# Patient Record
Sex: FEMALE | Race: WHITE | Employment: FULL TIME | URBAN - METROPOLITAN AREA
[De-identification: names, ages, dates, MRNs, and addresses within clinical notes are randomized per-mention and may not be internally consistent; named-entity substitution may affect disease eponyms.]

---

## 2017-01-04 ENCOUNTER — HOSPITAL ENCOUNTER (EMERGENCY)
Age: 22
Discharge: HOME | End: 2017-01-05
Attending: EMERGENCY MEDICINE
Payer: SELF-PAY

## 2017-01-04 DIAGNOSIS — F13.90 BENZODIAZEPINE MISUSE: ICD-10-CM

## 2017-01-04 DIAGNOSIS — F10.929 ALCOHOL INTOXICATION, WITH UNSPECIFIED COMPLICATION (HCC): Primary | ICD-10-CM

## 2017-01-04 LAB
ALBUMIN SERPL BCP-MCNC: 3.9 G/DL (ref 3.5–5)
ALBUMIN/GLOB SERPL: 1.1 {RATIO} (ref 1.1–2.2)
ALP SERPL-CCNC: 74 U/L (ref 45–117)
ALT SERPL-CCNC: 22 U/L (ref 12–78)
ANION GAP BLD CALC-SCNC: 11 MMOL/L (ref 5–15)
AST SERPL W P-5'-P-CCNC: 16 U/L (ref 15–37)
BASOPHILS # BLD AUTO: 0 K/UL (ref 0–0.1)
BASOPHILS # BLD: 0 % (ref 0–1)
BILIRUB SERPL-MCNC: 0.2 MG/DL (ref 0.2–1)
BUN SERPL-MCNC: 14 MG/DL (ref 6–20)
BUN/CREAT SERPL: 14 (ref 12–20)
CALCIUM SERPL-MCNC: 8.5 MG/DL (ref 8.5–10.1)
CHLORIDE SERPL-SCNC: 107 MMOL/L (ref 97–108)
CO2 SERPL-SCNC: 26 MMOL/L (ref 21–32)
CREAT SERPL-MCNC: 1.02 MG/DL (ref 0.55–1.02)
DIFFERENTIAL METHOD BLD: ABNORMAL
EOSINOPHIL # BLD: 0.1 K/UL (ref 0–0.4)
EOSINOPHIL NFR BLD: 1 % (ref 0–7)
ERYTHROCYTE [DISTWIDTH] IN BLOOD BY AUTOMATED COUNT: 13.6 % (ref 11.5–14.5)
GLOBULIN SER CALC-MCNC: 3.4 G/DL (ref 2–4)
GLUCOSE SERPL-MCNC: 86 MG/DL (ref 65–100)
HCG SERPL QL: NEGATIVE
HCT VFR BLD AUTO: 41 % (ref 35–47)
HGB BLD-MCNC: 13.7 G/DL (ref 11.5–16)
LYMPHOCYTES # BLD AUTO: 38 % (ref 12–49)
LYMPHOCYTES # BLD: 2.7 K/UL (ref 0.8–3.5)
MCH RBC QN AUTO: 29.1 PG (ref 26–34)
MCHC RBC AUTO-ENTMCNC: 33.4 G/DL (ref 30–36.5)
MCV RBC AUTO: 87 FL (ref 80–99)
MONOCYTES # BLD: 0.2 K/UL (ref 0–1)
MONOCYTES NFR BLD AUTO: 3 % (ref 5–13)
NEUTS SEG # BLD: 4.1 K/UL (ref 1.8–8)
NEUTS SEG NFR BLD AUTO: 58 % (ref 32–75)
PLATELET # BLD AUTO: 260 K/UL (ref 150–400)
POTASSIUM SERPL-SCNC: 3.9 MMOL/L (ref 3.5–5.1)
PROT SERPL-MCNC: 7.3 G/DL (ref 6.4–8.2)
RBC # BLD AUTO: 4.71 M/UL (ref 3.8–5.2)
SODIUM SERPL-SCNC: 144 MMOL/L (ref 136–145)
WBC # BLD AUTO: 7.1 K/UL (ref 3.6–11)

## 2017-01-04 PROCEDURE — 84703 CHORIONIC GONADOTROPIN ASSAY: CPT | Performed by: EMERGENCY MEDICINE

## 2017-01-04 PROCEDURE — 36415 COLL VENOUS BLD VENIPUNCTURE: CPT | Performed by: EMERGENCY MEDICINE

## 2017-01-04 PROCEDURE — 99284 EMERGENCY DEPT VISIT MOD MDM: CPT

## 2017-01-04 PROCEDURE — 80307 DRUG TEST PRSMV CHEM ANLYZR: CPT | Performed by: EMERGENCY MEDICINE

## 2017-01-04 PROCEDURE — 85025 COMPLETE CBC W/AUTO DIFF WBC: CPT | Performed by: EMERGENCY MEDICINE

## 2017-01-04 PROCEDURE — 80053 COMPREHEN METABOLIC PANEL: CPT | Performed by: EMERGENCY MEDICINE

## 2017-01-04 PROCEDURE — 96360 HYDRATION IV INFUSION INIT: CPT

## 2017-01-04 PROCEDURE — 74011250636 HC RX REV CODE- 250/636: Performed by: EMERGENCY MEDICINE

## 2017-01-04 RX ADMIN — SODIUM CHLORIDE 1000 ML: 900 INJECTION, SOLUTION INTRAVENOUS at 00:00

## 2017-01-04 NOTE — Clinical Note
- Please do not drink alcohol to excess. - Do not mix alcohol with your Alprazolam or Mirtazapine or Tizanidine.  
- Return to ED for any concerns.

## 2017-01-05 VITALS
DIASTOLIC BLOOD PRESSURE: 69 MMHG | SYSTOLIC BLOOD PRESSURE: 109 MMHG | RESPIRATION RATE: 12 BRPM | OXYGEN SATURATION: 95 % | TEMPERATURE: 98.1 F

## 2017-01-05 LAB
AMPHET UR QL SCN: NEGATIVE
APAP SERPL-MCNC: <2 UG/ML (ref 10–30)
APPEARANCE UR: ABNORMAL
ATRIAL RATE: 95 BPM
BARBITURATES UR QL SCN: NEGATIVE
BENZODIAZ UR QL: POSITIVE
BILIRUB UR QL: NEGATIVE
CALCULATED P AXIS, ECG09: 66 DEGREES
CALCULATED R AXIS, ECG10: 53 DEGREES
CALCULATED T AXIS, ECG11: 48 DEGREES
CANNABINOIDS UR QL SCN: NEGATIVE
COCAINE UR QL SCN: NEGATIVE
COLOR UR: ABNORMAL
DIAGNOSIS, 93000: NORMAL
DRUG SCRN COMMENT,DRGCM: ABNORMAL
ETHANOL SERPL-MCNC: 163 MG/DL
GLUCOSE UR STRIP.AUTO-MCNC: NEGATIVE MG/DL
HGB UR QL STRIP: NEGATIVE
KETONES UR QL STRIP.AUTO: NEGATIVE MG/DL
LEUKOCYTE ESTERASE UR QL STRIP.AUTO: NEGATIVE
METHADONE UR QL: NEGATIVE
NITRITE UR QL STRIP.AUTO: NEGATIVE
OPIATES UR QL: NEGATIVE
P-R INTERVAL, ECG05: 150 MS
PCP UR QL: NEGATIVE
PH UR STRIP: 6 [PH] (ref 5–8)
PROT UR STRIP-MCNC: NEGATIVE MG/DL
Q-T INTERVAL, ECG07: 378 MS
QRS DURATION, ECG06: 84 MS
QTC CALCULATION (BEZET), ECG08: 475 MS
SALICYLATES SERPL-MCNC: 2.1 MG/DL (ref 2.8–20)
SP GR UR REFRACTOMETRY: 1.02 (ref 1–1.03)
UROBILINOGEN UR QL STRIP.AUTO: 0.2 EU/DL (ref 0.2–1)
VENTRICULAR RATE, ECG03: 95 BPM

## 2017-01-05 PROCEDURE — 80307 DRUG TEST PRSMV CHEM ANLYZR: CPT | Performed by: EMERGENCY MEDICINE

## 2017-01-05 PROCEDURE — 77030011943

## 2017-01-05 PROCEDURE — 81003 URINALYSIS AUTO W/O SCOPE: CPT | Performed by: EMERGENCY MEDICINE

## 2017-01-05 PROCEDURE — 74011250636 HC RX REV CODE- 250/636: Performed by: EMERGENCY MEDICINE

## 2017-01-05 PROCEDURE — 93005 ELECTROCARDIOGRAM TRACING: CPT

## 2017-01-05 RX ADMIN — SODIUM CHLORIDE 1000 ML: 900 INJECTION, SOLUTION INTRAVENOUS at 02:57

## 2017-01-05 NOTE — ED NOTES
Pt d/c'd by Dr. Laith Azul. D/c instructions in boyfriend's hand. Pt wheeled out of ED w/ RN and boyfriend. Pt appears in no apparent distress at time of d/c.

## 2017-01-05 NOTE — DISCHARGE INSTRUCTIONS
We hope that we have addressed all of your medical concerns. The examination and treatment you received in the Emergency Department were for an emergent problem and were not intended as complete care. It is important that you follow up with your healthcare provider(s) for ongoing care. If your symptoms worsen or do not improve as expected, and you are unable to reach your usual health care provider(s), you should return to the Emergency Department. Today's healthcare is undergoing tremendous change, and patient satisfaction surveys are one of the many tools to assess the quality of medical care. You may receive a survey from the [employer] regarding your experience in the Emergency Department. I hope that your experience has been completely positive, particularly the medical care that I provided. As such, please participate in the survey; anything less than excellent does not meet my expectations or intentions. [facility] and [facility] participate in nationally recognized quality of care measures. If your blood pressure is greater than 120/80, as reported below, we urge that you seek medical care to address the potential of high blood pressure, commonly known as hypertension. Hypertension can be hereditary or can be caused by certain medical conditions, pain, stress, or \"white coat syndrome. \"       Please make an appointment with your health care provider(s) for follow up of your Emergency Department visit.        VITALS:   Patient Vitals for the past 8 hrs:   Temp Resp BP SpO2   01/05/17 0215 - - 111/51 98 %   01/05/17 0200 - - 109/48 98 %   01/05/17 0145 - - 111/50 98 %   01/05/17 0130 - - 107/55 98 %   01/05/17 0115 - - 102/48 98 %   01/05/17 0100 - - 106/47 98 %   01/05/17 0045 - - 93/48 98 %   01/05/17 0030 - - 102/42 98 %   01/05/17 0015 - - 108/48 99 %   01/05/17 0000 - - 104/45 90 %   01/04/17 2345 - - 113/64 91 %   01/04/17 2332 - - - 97 %   01/04/17 2331 98.1 °F (36.7 °C) 12 121/69 -          Thank you for allowing us to provide you with medical care today. We realize that you have many choices for your emergency care needs. Please choose us in the future for any continued health care needs. Yanna Owusu, [address] [facility] [address].   Office: [phone]            Recent Results (from the past 24 hour(s))   CBC WITH AUTOMATED DIFF    Collection Time: 01/04/17 11:32 PM   Result Value Ref Range    WBC 7.1 3.6 - 11.0 K/uL    RBC 4.71 3.80 - 5.20 M/uL    HGB 13.7 11.5 - 16.0 g/dL    HCT 41.0 35.0 - 47.0 %    MCV 87.0 80.0 - 99.0 FL    MCH 29.1 26.0 - 34.0 PG    MCHC 33.4 30.0 - 36.5 g/dL    RDW 13.6 11.5 - 14.5 %    PLATELET 040 975 - 575 K/uL    NEUTROPHILS 58 32 - 75 %    LYMPHOCYTES 38 12 - 49 %    MONOCYTES 3 (L) 5 - 13 %    EOSINOPHILS 1 0 - 7 %    BASOPHILS 0 0 - 1 %    ABS. NEUTROPHILS 4.1 1.8 - 8.0 K/UL    ABS. LYMPHOCYTES 2.7 0.8 - 3.5 K/UL    ABS. MONOCYTES 0.2 0.0 - 1.0 K/UL    ABS. EOSINOPHILS 0.1 0.0 - 0.4 K/UL    ABS. BASOPHILS 0.0 0.0 - 0.1 K/UL    DF AUTOMATED     METABOLIC PANEL, COMPREHENSIVE    Collection Time: 01/04/17 11:32 PM   Result Value Ref Range    Sodium 144 136 - 145 mmol/L    Potassium 3.9 3.5 - 5.1 mmol/L    Chloride 107 97 - 108 mmol/L    CO2 26 21 - 32 mmol/L    Anion gap 11 5 - 15 mmol/L    Glucose 86 65 - 100 mg/dL    BUN 14 6 - 20 MG/DL    Creatinine 1.02 0.55 - 1.02 MG/DL    BUN/Creatinine ratio 14 12 - 20      GFR est AA >60 >60 ml/min/1.73m2    GFR est non-AA >60 >60 ml/min/1.73m2    Calcium 8.5 8.5 - 10.1 MG/DL    Bilirubin, total 0.2 0.2 - 1.0 MG/DL    ALT 22 12 - 78 U/L    AST 16 15 - 37 U/L    Alk.  phosphatase 74 45 - 117 U/L    Protein, total 7.3 6.4 - 8.2 g/dL    Albumin 3.9 3.5 - 5.0 g/dL    Globulin 3.4 2.0 - 4.0 g/dL    A-G Ratio 1.1 1.1 - 2.2     ETHYL ALCOHOL    Collection Time: 01/04/17 11:32 PM   Result Value Ref Range    ALCOHOL(ETHYL),SERUM 163 (H) <10 MG/DL   HCG QL SERUM    Collection Time: 01/04/17 11:32 PM   Result Value Ref Range    HCG, Ql. NEGATIVE  NEG     ACETAMINOPHEN    Collection Time: 01/04/17 11:32 PM   Result Value Ref Range    ACETAMINOPHEN <2 (L) 10 - 30 ug/mL   SALICYLATE    Collection Time: 01/04/17 11:32 PM   Result Value Ref Range    SALICYLATE 2.1 (L) 2.8 - 20.0 MG/DL   EKG, 12 LEAD, INITIAL    Collection Time: 01/05/17 12:05 AM   Result Value Ref Range    Ventricular Rate 95 BPM    Atrial Rate 95 BPM    P-R Interval 150 ms    QRS Duration 84 ms    Q-T Interval 378 ms    QTC Calculation (Bezet) 475 ms    Calculated P Axis 66 degrees    Calculated R Axis 53 degrees    Calculated T Axis 48 degrees    Diagnosis       Normal sinus rhythm  Nonspecific T wave abnormality  Prolonged QT  Abnormal ECG  When compared with ECG of 02-DEC-2016 14:40,  No significant change was found     DRUG SCREEN, URINE    Collection Time: 01/05/17 12:16 AM   Result Value Ref Range    AMPHETAMINE NEGATIVE  NEG      BARBITURATES NEGATIVE  NEG      BENZODIAZEPINE POSITIVE (A) NEG      COCAINE NEGATIVE  NEG      METHADONE NEGATIVE  NEG      OPIATES NEGATIVE  NEG      PCP(PHENCYCLIDINE) NEGATIVE  NEG      THC (TH-CANNABINOL) NEGATIVE  NEG      Drug screen comment (NOTE)    URINALYSIS W/ RFLX MICROSCOPIC    Collection Time: 01/05/17 12:16 AM   Result Value Ref Range    Color YELLOW/STRAW      Appearance HAZY (A) CLEAR      Specific gravity 1.025 1.003 - 1.030      pH (UA) 6.0 5.0 - 8.0      Protein NEGATIVE  NEG mg/dL    Glucose NEGATIVE  NEG mg/dL    Ketone NEGATIVE  NEG mg/dL    Bilirubin NEGATIVE  NEG      Blood NEGATIVE  NEG      Urobilinogen 0.2 0.2 - 1.0 EU/dL    Nitrites NEGATIVE  NEG      Leukocyte Esterase NEGATIVE  NEG         Acute Alcohol Intoxication: Care Instructions  Your Care Instructions  You have had treatment to help your body rid itself of alcohol. Too much alcohol upsets the body's fluid balance.  Your doctor may have given you fluids and vitamins. For some people, drinking too much alcohol is a one-time event. For others, it is an ongoing problem. In either case, it is serious. It can be life-threatening. Follow-up care is a key part of your treatment and safety. Be sure to make and go to all appointments, and call your doctor if you are having problems. It's also a good idea to know your test results and keep a list of the medicines you take. How can you care for yourself at home? · Be safe with medicines. Take your medicines exactly as prescribed. Call your doctor if you think you are having a problem with your medicine. · Your doctor may have prescribed disulfiram (Antabuse). Do not drink any alcohol while you are taking this medicine. You may have severe or even life-threatening side effects from even small amounts of alcohol. · If you were given medicine to prevent nausea, be sure to take it exactly as prescribed. · Before you take any medicine, tell your doctor if:  ¨ You have had a bad reaction to any medicines in the past.  ¨ You are taking other medicines, including over-the-counter ones, or have other health problems. ¨ You are or could be pregnant. · Be prepared to have some symptoms of withdrawal in the next few days. · Drink plenty of liquids in the next few days. · Seek help if you need it to stop drinking. Getting counseling and joining a support group can help you stay sober. Try a support group such as Alcoholics Anonymous. · Avoid alcohol when you take medicines. It can react with many medicines and cause serious problems. When should you call for help? Call 911 anytime you think you may need emergency care. For example, call if:  · You feel confused and are seeing things that are not there. · You are thinking about killing yourself or hurting others. · You have a seizure. · You vomit blood or what looks like coffee grounds.   Call your doctor now or seek immediate medical care if:  · You have trembling, restlessness, sweating, and other withdrawal symptoms that are new or that get worse. · Your withdrawal symptoms come back after not bothering you for days or weeks. · You can't stop vomiting. Watch closely for changes in your health, and be sure to contact your doctor if:  · You need help to stop drinking. Where can you learn more? Go to http://kirk-izzy.info/. Enter T102 in the search box to learn more about \"Acute Alcohol Intoxication: Care Instructions. \"  Current as of: May 27, 2016  Content Version: 11.1  © 2229-3357 [employer]. Care instructions adapted under license by [employer] (which disclaims liability or warranty for this information). If you have questions about a medical condition or this instruction, always ask your healthcare professional. [address] any warranty or liability for your use of this information. Poisoning (Benzodiazepine): Care Instructions  Your Care Instructions  You have had treatment to help your body get rid of a large amount of a benzodiazepine medicine. You are recovering, but you may not feel well for a while. This is because it takes time for the medicine to leave your body. Doctors prescribe benzodiazepine medicines to treat anxiety, muscle spasms, sleep disorders, and seizures. You may know them by their generic and brand names. These include alprazolam (Xanax), diazepam (Valium), and lorazepam (Ativan). While you were with the doctor, he or she may have:  · Found out what kind of medicine you took. The doctor may have tested your urine and blood to identify the medicine. You may have had other tests too. · Given you an antidote for benzodiazepine through a tube in your vein, called an IV. This prevents or undoes some of the effects of the medicine. · Helped you breathe by giving you oxygen. This is given through a mask or nasal cannula (say \"ARLEN-yuh-luke\").  A cannula is a thin tube with two openings that fit just inside your nose. Or your doctor may have put an oxygen tube down your throat. · Given you activated charcoal by mouth. This is to help remove the benzodiazepine from your digestive system. This may give you diarrhea. And it may turn your stool black for a few days. · Given you fluids. The doctor also watched you closely to make sure you were recovering safely. Follow-up care is a key part of your treatment and safety. Be sure to make and go to all appointments, and call your doctor if you are having problems. It's also a good idea to know your test results and keep a list of the medicines you take. How can you care for yourself at home? · Get plenty of rest. The medicine that made you sick may take a long time to get out of your body completely. · If you had a tube in your throat to help you breathe, you may have a sore throat or feel hoarse for a few days. Drink plenty of fluids. Fluids may help soothe your throat. Hot fluids, such as tea or soup, may help ease throat pain. · If you are going to keep taking benzodiazepine medicine, make sure you take it exactly as directed by your doctor. · If you took too much medicine on purpose, or if you feel like you want to do it again, talk with your doctor or a counselor. · Avoid drinking alcohol. Alcohol increases the effect of these medicines. This can make you very ill. When should you call for help? Call 911 anytime you think you may need emergency care. For example, call if:  · You have a seizure. · You are thinking about killing yourself or hurting others. Call your doctor now or seek immediate medical care if:  · You have serious withdrawal symptoms such as confusion, hallucinations, or severe trembling. Watch closely for changes in your health, and be sure to contact your doctor if:  · You do not get better as expected.   · You have been feeling sad, depressed, or hopeless or have lost interest in things that you usually enjoy. · You think you may be addicted to benzodiazepines or any other substance. Where can you learn more? Go to http://kirk-izzy.info/. Enter L668 in the search box to learn more about \"Poisoning (Benzodiazepine): Care Instructions. \"  Current as of: May 27, 2016  Content Version: 11.1  © 3877-4363 [facility]. Care instructions adapted under license by [employer] (which disclaims liability or warranty for this information). If you have questions about a medical condition or this instruction, always ask your healthcare professional. [place] [address] any warranty or liability for your use of this information.
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
## 2017-01-26 ENCOUNTER — APPOINTMENT (OUTPATIENT)
Dept: ULTRASOUND IMAGING | Age: 22
End: 2017-01-26
Attending: NURSE PRACTITIONER
Payer: SELF-PAY

## 2017-01-26 ENCOUNTER — HOSPITAL ENCOUNTER (EMERGENCY)
Age: 22
Discharge: HOME | End: 2017-01-26
Attending: EMERGENCY MEDICINE
Payer: SELF-PAY

## 2017-01-26 ENCOUNTER — APPOINTMENT (OUTPATIENT)
Dept: CT IMAGING | Age: 22
End: 2017-01-26
Attending: NURSE PRACTITIONER
Payer: SELF-PAY

## 2017-01-26 VITALS
WEIGHT: 144.62 LBS | HEART RATE: 108 BPM | SYSTOLIC BLOOD PRESSURE: 139 MMHG | TEMPERATURE: 98.4 F | DIASTOLIC BLOOD PRESSURE: 81 MMHG | BODY MASS INDEX: 26.61 KG/M2 | RESPIRATION RATE: 16 BRPM | OXYGEN SATURATION: 99 % | HEIGHT: 62 IN

## 2017-01-26 DIAGNOSIS — R10.9 ABDOMINAL PAIN, UNSPECIFIED LOCATION: Primary | ICD-10-CM

## 2017-01-26 LAB
ALBUMIN SERPL BCP-MCNC: 3.8 G/DL (ref 3.5–5)
ALBUMIN/GLOB SERPL: 1.1 {RATIO} (ref 1.1–2.2)
ALP SERPL-CCNC: 77 U/L (ref 45–117)
ALT SERPL-CCNC: 23 U/L (ref 12–78)
ANION GAP BLD CALC-SCNC: 9 MMOL/L (ref 5–15)
APPEARANCE UR: CLEAR
AST SERPL W P-5'-P-CCNC: 17 U/L (ref 15–37)
BACTERIA URNS QL MICRO: NEGATIVE /HPF
BASOPHILS # BLD AUTO: 0 K/UL (ref 0–0.1)
BASOPHILS # BLD: 0 % (ref 0–1)
BILIRUB SERPL-MCNC: 0.2 MG/DL (ref 0.2–1)
BILIRUB UR QL: NEGATIVE
BUN SERPL-MCNC: 14 MG/DL (ref 6–20)
BUN/CREAT SERPL: 18 (ref 12–20)
CALCIUM SERPL-MCNC: 9.2 MG/DL (ref 8.5–10.1)
CHLORIDE SERPL-SCNC: 107 MMOL/L (ref 97–108)
CLUE CELLS VAG QL WET PREP: NORMAL
CO2 SERPL-SCNC: 27 MMOL/L (ref 21–32)
COLOR UR: NORMAL
CREAT SERPL-MCNC: 0.77 MG/DL (ref 0.55–1.02)
DIFFERENTIAL METHOD BLD: ABNORMAL
EOSINOPHIL # BLD: 0.1 K/UL (ref 0–0.4)
EOSINOPHIL NFR BLD: 1 % (ref 0–7)
EPITH CASTS URNS QL MICRO: NORMAL /LPF
ERYTHROCYTE [DISTWIDTH] IN BLOOD BY AUTOMATED COUNT: 13.3 % (ref 11.5–14.5)
GLOBULIN SER CALC-MCNC: 3.5 G/DL (ref 2–4)
GLUCOSE SERPL-MCNC: 96 MG/DL (ref 65–100)
GLUCOSE UR STRIP.AUTO-MCNC: NEGATIVE MG/DL
HCG SERPL-ACNC: <1 MIU/ML (ref 0–6)
HCT VFR BLD AUTO: 41.4 % (ref 35–47)
HGB BLD-MCNC: 14 G/DL (ref 11.5–16)
HGB UR QL STRIP: NEGATIVE
KETONES UR QL STRIP.AUTO: NEGATIVE MG/DL
KOH PREP SPEC: NORMAL
LEUKOCYTE ESTERASE UR QL STRIP.AUTO: NEGATIVE
LYMPHOCYTES # BLD AUTO: 20 % (ref 12–49)
LYMPHOCYTES # BLD: 1.7 K/UL (ref 0.8–3.5)
MCH RBC QN AUTO: 29.1 PG (ref 26–34)
MCHC RBC AUTO-ENTMCNC: 33.8 G/DL (ref 30–36.5)
MCV RBC AUTO: 86.1 FL (ref 80–99)
MONOCYTES # BLD: 0.3 K/UL (ref 0–1)
MONOCYTES NFR BLD AUTO: 4 % (ref 5–13)
NEUTS SEG # BLD: 6.3 K/UL (ref 1.8–8)
NEUTS SEG NFR BLD AUTO: 75 % (ref 32–75)
NITRITE UR QL STRIP.AUTO: NEGATIVE
PH UR STRIP: 7 [PH] (ref 5–8)
PLATELET # BLD AUTO: 254 K/UL (ref 150–400)
POTASSIUM SERPL-SCNC: 4.3 MMOL/L (ref 3.5–5.1)
PROT SERPL-MCNC: 7.3 G/DL (ref 6.4–8.2)
PROT UR STRIP-MCNC: NEGATIVE MG/DL
RBC # BLD AUTO: 4.81 M/UL (ref 3.8–5.2)
RBC #/AREA URNS HPF: NORMAL /HPF (ref 0–5)
SERVICE CMNT-IMP: NORMAL
SODIUM SERPL-SCNC: 143 MMOL/L (ref 136–145)
SP GR UR REFRACTOMETRY: 1.01 (ref 1–1.03)
T VAGINALIS VAG QL WET PREP: NORMAL
UA: UC IF INDICATED,UAUC: NORMAL
UROBILINOGEN UR QL STRIP.AUTO: 0.2 EU/DL (ref 0.2–1)
WBC # BLD AUTO: 8.4 K/UL (ref 3.6–11)
WBC URNS QL MICRO: NORMAL /HPF (ref 0–4)

## 2017-01-26 PROCEDURE — 74011636320 HC RX REV CODE- 636/320: Performed by: EMERGENCY MEDICINE

## 2017-01-26 PROCEDURE — 76801 OB US < 14 WKS SINGLE FETUS: CPT

## 2017-01-26 PROCEDURE — 99285 EMERGENCY DEPT VISIT HI MDM: CPT

## 2017-01-26 PROCEDURE — 96372 THER/PROPH/DIAG INJ SC/IM: CPT

## 2017-01-26 PROCEDURE — 80053 COMPREHEN METABOLIC PANEL: CPT | Performed by: NURSE PRACTITIONER

## 2017-01-26 PROCEDURE — 84702 CHORIONIC GONADOTROPIN TEST: CPT | Performed by: NURSE PRACTITIONER

## 2017-01-26 PROCEDURE — 74177 CT ABD & PELVIS W/CONTRAST: CPT

## 2017-01-26 PROCEDURE — 76817 TRANSVAGINAL US OBSTETRIC: CPT

## 2017-01-26 PROCEDURE — 74011250636 HC RX REV CODE- 250/636: Performed by: NURSE PRACTITIONER

## 2017-01-26 PROCEDURE — 96374 THER/PROPH/DIAG INJ IV PUSH: CPT

## 2017-01-26 PROCEDURE — 87491 CHLMYD TRACH DNA AMP PROBE: CPT | Performed by: NURSE PRACTITIONER

## 2017-01-26 PROCEDURE — 87210 SMEAR WET MOUNT SALINE/INK: CPT | Performed by: NURSE PRACTITIONER

## 2017-01-26 PROCEDURE — 85025 COMPLETE CBC W/AUTO DIFF WBC: CPT | Performed by: NURSE PRACTITIONER

## 2017-01-26 PROCEDURE — 36415 COLL VENOUS BLD VENIPUNCTURE: CPT | Performed by: NURSE PRACTITIONER

## 2017-01-26 PROCEDURE — 96361 HYDRATE IV INFUSION ADD-ON: CPT

## 2017-01-26 PROCEDURE — 96375 TX/PRO/DX INJ NEW DRUG ADDON: CPT

## 2017-01-26 PROCEDURE — 74011250637 HC RX REV CODE- 250/637: Performed by: NURSE PRACTITIONER

## 2017-01-26 PROCEDURE — 81001 URINALYSIS AUTO W/SCOPE: CPT | Performed by: NURSE PRACTITIONER

## 2017-01-26 RX ORDER — ONDANSETRON 2 MG/ML
4 INJECTION INTRAMUSCULAR; INTRAVENOUS
Status: COMPLETED | OUTPATIENT
Start: 2017-01-26 | End: 2017-01-26

## 2017-01-26 RX ORDER — KETOROLAC TROMETHAMINE 30 MG/ML
30 INJECTION, SOLUTION INTRAMUSCULAR; INTRAVENOUS
Status: COMPLETED | OUTPATIENT
Start: 2017-01-26 | End: 2017-01-26

## 2017-01-26 RX ORDER — DICYCLOMINE HYDROCHLORIDE 10 MG/1
10 CAPSULE ORAL
Status: COMPLETED | OUTPATIENT
Start: 2017-01-26 | End: 2017-01-26

## 2017-01-26 RX ORDER — HYDROMORPHONE HYDROCHLORIDE 1 MG/ML
1 INJECTION, SOLUTION INTRAMUSCULAR; INTRAVENOUS; SUBCUTANEOUS ONCE
Status: COMPLETED | OUTPATIENT
Start: 2017-01-26 | End: 2017-01-26

## 2017-01-26 RX ORDER — IBUPROFEN 200 MG
800 TABLET ORAL
COMMUNITY
End: 2017-09-23

## 2017-01-26 RX ORDER — DICYCLOMINE HYDROCHLORIDE 10 MG/1
10 CAPSULE ORAL 4 TIMES DAILY
Qty: 15 CAP | Refills: 0 | Status: SHIPPED | OUTPATIENT
Start: 2017-01-26 | End: 2017-01-31

## 2017-01-26 RX ORDER — ACETAMINOPHEN 500 MG
1000 TABLET ORAL
Status: ON HOLD | COMMUNITY
End: 2018-10-23

## 2017-01-26 RX ADMIN — HUMAN RHO(D) IMMUNE GLOBULIN 0.3 MG: 300 INJECTION, SOLUTION INTRAMUSCULAR at 18:58

## 2017-01-26 RX ADMIN — KETOROLAC TROMETHAMINE 30 MG: 30 INJECTION, SOLUTION INTRAMUSCULAR at 18:23

## 2017-01-26 RX ADMIN — HYDROMORPHONE HYDROCHLORIDE 1 MG: 1 INJECTION, SOLUTION INTRAMUSCULAR; INTRAVENOUS; SUBCUTANEOUS at 18:24

## 2017-01-26 RX ADMIN — IOPAMIDOL 100 ML: 755 INJECTION, SOLUTION INTRAVENOUS at 20:02

## 2017-01-26 RX ADMIN — SODIUM CHLORIDE 1000 ML: 900 INJECTION, SOLUTION INTRAVENOUS at 18:22

## 2017-01-26 RX ADMIN — SODIUM CHLORIDE 1000 ML: 900 INJECTION, SOLUTION INTRAVENOUS at 16:58

## 2017-01-26 RX ADMIN — ONDANSETRON 4 MG: 2 INJECTION INTRAMUSCULAR; INTRAVENOUS at 18:20

## 2017-01-26 RX ADMIN — DICYCLOMINE HYDROCHLORIDE 10 MG: 10 CAPSULE ORAL at 20:48

## 2017-01-26 NOTE — ED NOTES
Mari Kaufman, NP at bedside to evaluate patient. Patient in no distress; moderate discomfort. Urine sample provided and sent to lab for testing.
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
## 2017-01-26 NOTE — ED NOTES
Patient resting on stretcher in no distress; moderate discomfort. IV access established in left hand x1 attempt. IV fluids infusing via gravity without difficulty as ordered. Patient updated with regards to treatment plan; verbalizes understanding and agreement. Call bell in reach. Will continue to monitor.

## 2017-01-26 NOTE — ED TRIAGE NOTES
Patient presents to treatment area ambulatory with a steady gait. Patient states she is 5.5 weeks pregnant. Patient complains of severe pelvic cramping that began yesterday. Patient states she awoke last night around 2330 and again at about 0300 this morning with vaginal bleeding and \"black clots\". Bleeding has ceased since that time. History of miscarriage x2. Patient also complains of lower back pain and dysuria. Dysuria began about 2 days PTA. Patient had temperature of 100.3 at about 1330 today. Patient took Tylenol. Motrin taken throughout the day for cramping.

## 2017-01-27 LAB
C TRACH DNA SPEC QL NAA+PROBE: NEGATIVE
N GONORRHOEA DNA SPEC QL NAA+PROBE: NEGATIVE
SAMPLE TYPE: NORMAL
SERVICE CMNT-IMP: NORMAL
SPECIMEN SOURCE: NORMAL

## 2017-01-27 NOTE — ED NOTES
The patient was discharged home by Zakia Abbott NP in stable condition. The patient is alert and oriented, in no respiratory distress. The patient's diagnosis, condition and treatment were explained. The patient expressed understanding. One prescription given. No work/school note given. A discharge plan has been developed. A  was not involved in the process. Aftercare instructions were given. Pt ambulatory out of the ED.

## 2017-01-27 NOTE — DISCHARGE INSTRUCTIONS

## 2017-01-27 NOTE — ED NOTES
Patient resting on stretcher in no distress. States pain is worsening again. Vital signs updated at this time. IV fluids infusing via gravity without difficulty. Family member at bedside. Call bell in reach. Will continue to monitor.

## 2017-01-27 NOTE — ED NOTES
Patient resting on stretcher in no distress. Patient medicated for pain and nausea as ordered. Patient tolerated medication well. IV fluids infusing via gravity without difficulty. Monitor x3 applied to patient due to pain medication administration. Vital signs updated. Family at bedside. Call bell in reach. Will continue to monitor.

## 2017-02-01 LAB
BLD PROD TYP BPU: NORMAL
BPU ID: NORMAL
GA (WEEKS): NORMAL WK
STATUS OF UNIT,%ST: NORMAL
UNIT DIVISION, %UDIV: 0

## 2017-03-24 ENCOUNTER — ED HISTORICAL/CONVERTED ENCOUNTER (OUTPATIENT)
Dept: OTHER | Age: 22
End: 2017-03-24

## 2017-05-11 ENCOUNTER — ED HISTORICAL/CONVERTED ENCOUNTER (OUTPATIENT)
Dept: OTHER | Age: 22
End: 2017-05-11

## 2017-06-28 ENCOUNTER — HOSPITAL ENCOUNTER (INPATIENT)
Age: 22
LOS: 1 days | Discharge: HOME | DRG: 566 | End: 2017-07-01
Attending: SPECIALIST | Admitting: OBSTETRICS & GYNECOLOGY
Payer: MEDICAID

## 2017-06-28 PROBLEM — R11.10 HYPEREMESIS: Status: ACTIVE | Noted: 2017-06-28

## 2017-06-28 LAB
ALBUMIN SERPL BCP-MCNC: 3.5 G/DL (ref 3.5–5)
ALBUMIN/GLOB SERPL: 0.9 {RATIO} (ref 1.1–2.2)
ALP SERPL-CCNC: 84 U/L (ref 45–117)
ALT SERPL-CCNC: 36 U/L (ref 12–78)
ANION GAP BLD CALC-SCNC: 8 MMOL/L (ref 5–15)
APPEARANCE UR: ABNORMAL
AST SERPL W P-5'-P-CCNC: 19 U/L (ref 15–37)
BACTERIA URNS QL MICRO: ABNORMAL /HPF
BILIRUB SERPL-MCNC: 0.7 MG/DL (ref 0.2–1)
BILIRUB UR QL: NEGATIVE
BUN SERPL-MCNC: 6 MG/DL (ref 6–20)
BUN/CREAT SERPL: 19 (ref 12–20)
CALCIUM SERPL-MCNC: 9 MG/DL (ref 8.5–10.1)
CHLORIDE SERPL-SCNC: 104 MMOL/L (ref 97–108)
CO2 SERPL-SCNC: 23 MMOL/L (ref 21–32)
COLOR UR: ABNORMAL
CREAT SERPL-MCNC: 0.32 MG/DL (ref 0.55–1.02)
EPITH CASTS URNS QL MICRO: ABNORMAL /LPF
ERYTHROCYTE [DISTWIDTH] IN BLOOD BY AUTOMATED COUNT: 13.7 % (ref 11.5–14.5)
GLOBULIN SER CALC-MCNC: 3.7 G/DL (ref 2–4)
GLUCOSE SERPL-MCNC: 80 MG/DL (ref 65–100)
GLUCOSE UR STRIP.AUTO-MCNC: NEGATIVE MG/DL
HCT VFR BLD AUTO: 34.3 % (ref 35–47)
HGB BLD-MCNC: 12.2 G/DL (ref 11.5–16)
HGB UR QL STRIP: NEGATIVE
KETONES UR QL STRIP.AUTO: >80 MG/DL
LEUKOCYTE ESTERASE UR QL STRIP.AUTO: ABNORMAL
MCH RBC QN AUTO: 29.4 PG (ref 26–34)
MCHC RBC AUTO-ENTMCNC: 35.6 G/DL (ref 30–36.5)
MCV RBC AUTO: 82.7 FL (ref 80–99)
NITRITE UR QL STRIP.AUTO: NEGATIVE
PH UR STRIP: 6 [PH] (ref 5–8)
PLATELET # BLD AUTO: 207 K/UL (ref 150–400)
POTASSIUM SERPL-SCNC: 3.5 MMOL/L (ref 3.5–5.1)
PROT SERPL-MCNC: 7.2 G/DL (ref 6.4–8.2)
PROT UR STRIP-MCNC: ABNORMAL MG/DL
RBC # BLD AUTO: 4.15 M/UL (ref 3.8–5.2)
RBC #/AREA URNS HPF: ABNORMAL /HPF (ref 0–5)
SODIUM SERPL-SCNC: 135 MMOL/L (ref 136–145)
SP GR UR REFRACTOMETRY: 1.03 (ref 1–1.03)
UROBILINOGEN UR QL STRIP.AUTO: 1 EU/DL (ref 0.2–1)
WBC # BLD AUTO: 10.7 K/UL (ref 3.6–11)
WBC URNS QL MICRO: ABNORMAL /HPF (ref 0–4)

## 2017-06-28 PROCEDURE — 74011250636 HC RX REV CODE- 250/636: Performed by: SPECIALIST

## 2017-06-28 PROCEDURE — 85027 COMPLETE CBC AUTOMATED: CPT | Performed by: SPECIALIST

## 2017-06-28 PROCEDURE — 99218 HC RM OBSERVATION: CPT

## 2017-06-28 PROCEDURE — 74011250637 HC RX REV CODE- 250/637: Performed by: SPECIALIST

## 2017-06-28 PROCEDURE — 81001 URINALYSIS AUTO W/SCOPE: CPT | Performed by: SPECIALIST

## 2017-06-28 PROCEDURE — 36415 COLL VENOUS BLD VENIPUNCTURE: CPT | Performed by: SPECIALIST

## 2017-06-28 PROCEDURE — 80053 COMPREHEN METABOLIC PANEL: CPT | Performed by: SPECIALIST

## 2017-06-28 RX ORDER — ONDANSETRON 2 MG/ML
4 INJECTION INTRAMUSCULAR; INTRAVENOUS
Status: DISCONTINUED | OUTPATIENT
Start: 2017-06-28 | End: 2017-07-01 | Stop reason: HOSPADM

## 2017-06-28 RX ORDER — SODIUM CHLORIDE 0.9 % (FLUSH) 0.9 %
5-10 SYRINGE (ML) INJECTION AS NEEDED
Status: DISCONTINUED | OUTPATIENT
Start: 2017-06-28 | End: 2017-07-01 | Stop reason: HOSPADM

## 2017-06-28 RX ORDER — SODIUM CHLORIDE 0.9 % (FLUSH) 0.9 %
5-10 SYRINGE (ML) INJECTION EVERY 8 HOURS
Status: DISCONTINUED | OUTPATIENT
Start: 2017-06-28 | End: 2017-07-01 | Stop reason: HOSPADM

## 2017-06-28 RX ORDER — ZOLPIDEM TARTRATE 5 MG/1
5 TABLET ORAL
Status: DISCONTINUED | OUTPATIENT
Start: 2017-06-28 | End: 2017-07-01 | Stop reason: HOSPADM

## 2017-06-28 RX ORDER — SODIUM CHLORIDE, SODIUM LACTATE, POTASSIUM CHLORIDE, CALCIUM CHLORIDE 600; 310; 30; 20 MG/100ML; MG/100ML; MG/100ML; MG/100ML
200 INJECTION, SOLUTION INTRAVENOUS CONTINUOUS
Status: DISCONTINUED | OUTPATIENT
Start: 2017-06-28 | End: 2017-06-30

## 2017-06-28 RX ADMIN — SODIUM CHLORIDE, SODIUM LACTATE, POTASSIUM CHLORIDE, AND CALCIUM CHLORIDE 1000 ML: 600; 310; 30; 20 INJECTION, SOLUTION INTRAVENOUS at 18:06

## 2017-06-28 RX ADMIN — ZOLPIDEM TARTRATE 5 MG: 5 TABLET ORAL at 20:26

## 2017-06-28 RX ADMIN — Medication 10 ML: at 18:07

## 2017-06-28 RX ADMIN — SODIUM CHLORIDE, SODIUM LACTATE, POTASSIUM CHLORIDE, AND CALCIUM CHLORIDE 200 ML: 600; 310; 30; 20 INJECTION, SOLUTION INTRAVENOUS at 18:07

## 2017-06-28 NOTE — PROGRESS NOTES
Primary Nurse Pricilla Muñiz, RN and Betty RN performed a dual skin assessment on this patient No impairment noted  Geovanni score is 23

## 2017-06-28 NOTE — IP AVS SNAPSHOT
[address] [facility] [address]. 
[phone] Patient: Tika Vargas MRN: [id number] CVD:[birth year] You are allergic to the following Allergen Reactions Latex Hives Monistat 1 (Tioconazole) (Tioconazole) Swelling Sulfa (Sulfonamide Antibiotics) Hives Recent Documentation Breastfeeding? OB Status Smoking Status No Having regular periods Former Smoker Emergency Contacts  (Rel.) Home Phone Work Phone Mobile Phone Brooklyn Wooten (Parent) [phone] -- -- About your hospitalization You were admitted on:  June 28, 2017 You last received care in the:  [address] You were discharged on:  July 1, 2017 Why you were hospitalized Your primary diagnosis was:  Not on File Your diagnoses also included:  Hyperemesis, Hyperemesis Gravidarum Providers Seen During Your Hospitalizations Provider Role Specialty Primary office phone Sandhya Herbert MD Attending Provider Obstetrics & Gynecology [phone] Your Primary Care Physician (PCP) Primary Care Physician Office Phone Office Fax Tanna Ellison [phone] Follow-up Information Follow up With Details Comments Contact Info Victoriano Gay MD   [place] [address] [address] [place] [address] 
[phone] Current Discharge Medication List  
  
START taking these medications Dose & Instructions Dispensing Information Comments Morning Noon Evening Bedtime  
 prochlorperazine 5 mg tablet Commonly known as:  COMPAZINE Your last dose was: Your next dose is:    
   
   
 Dose:  10 mg Take 2 Tabs by mouth every six (6) hours as needed for up to 7 days. Quantity:  30 Tab Refills:  0  
     
   
   
   
  
 zolpidem 5 mg tablet Commonly known as:  AMBIEN Your last dose was:     
   
Your next dose is:    
   
   
 Dose:  5 mg Take 1 Tab by mouth nightly as needed for Sleep. Max Daily Amount: 5 mg. Quantity:  10 Tab Refills:  0 ASK your doctor about these medications Dose & Instructions Dispensing Information Comments Morning Noon Evening Bedtime ACETAMINOPHEN EXTRA STRENGTH 500 mg tablet Generic drug:  acetaminophen Your last dose was: Your next dose is:    
   
   
 Dose:  1000 mg Take 1,000 mg by mouth every six (6) hours as needed for Pain. Refills:  0  
     
   
   
   
  
 ibuprofen 200 mg tablet Commonly known as:  MOTRIN Your last dose was: Your next dose is:    
   
   
 Dose:  800 mg Take 800 mg by mouth every six (6) hours as needed for Pain. Refills:  0 REMERON 30 mg tablet Generic drug:  mirtazapine Your last dose was: Your next dose is: Take  by mouth nightly. Refills:  0  
     
   
   
   
  
 tiZANidine 4 mg tablet Commonly known as:  Maris Olp Your last dose was: Your next dose is:    
   
   
 Dose:  4 mg Take 1 Tab by mouth two (2) times a day for 30 days. Quantity:  30 Tab Refills:  0  
     
   
   
   
  
 XANAX 2 mg tablet Generic drug:  ALPRAZolam  
   
Your last dose was: Your next dose is:    
   
   
 Dose:  2 mg Take 2 mg by mouth three (3) times daily as needed for Anxiety. Refills:  0 Where to Get Your Medications Information on where to get these meds will be given to you by the nurse or doctor. ! Ask your nurse or doctor about these medications  
  prochlorperazine 5 mg tablet  
 zolpidem 5 mg tablet Discharge Instructions None Discharge Orders None Introducing [facility]! Dear Susie Ruby: Thank you for requesting a [employer] account. Our records indicate that you already have an active [employer] account.   You can access your account anytime at [email or web address] Did you know that you can access your hospital and ER discharge instructions at any time in [employer]? You can also review all of your test results from your hospital stay or ER visit. Additional Information If you have questions, please visit the Frequently Asked Questions section of the [employer] website at [email or web address] Remember, [employer] is NOT to be used for urgent needs. For medical emergencies, dial 911. Now available from your iPhone and Android! General Information Please provide this summary of care documentation to your next provider. Patient Signature:  ____________________________________________________________ Date:  ____________________________________________________________  
  
[facility] Provider Signature:  ____________________________________________________________ Date:  ____________________________________________________________

## 2017-06-28 NOTE — PROGRESS NOTES
Oncology Nursing Communication Tool      6:44 PM  6/28/2017     Bedside shift change report given to Tran Marin RN (incoming nurse) by Cherry Henley RN (outgoing nurse) on Isabella Jackson a 20 y.o. female who was admitted on 6/28/2017  3:17 PM. Report included the following information SBAR, Kardex, Intake/Output, MAR, Accordion and Recent Results. Significant changes during shift: Direct admit this afternoon from OBGYN; Compazine PRN x1 given; AM labs; NPO after midnight      Issues for physician to address: No issue at this time Thank You            Code Status: Full Code     Infections: No current active infections     Allergies: Latex; Monistat 1 (tioconazole) [tioconazole]; and Sulfa (sulfonamide antibiotics)     Current diet: DIET REGULAR  DIET NPO       Pain Controlled [x] yes [] no   Bowel Movement [] yes [x] no   Last Bowel Movement (date) 6/26            Vital Signs:   Patient Vitals for the past 12 hrs:   Temp Pulse Resp BP SpO2   06/28/17 1545 97.7 °F (36.5 °C) 77 16 117/61 97 %      Intake & Output:   No intake or output data in the 24 hours ending 06/28/17 1844   Laboratory Results:     Recent Results (from the past 12 hour(s))   METABOLIC PANEL, COMPREHENSIVE    Collection Time: 06/28/17  4:16 PM   Result Value Ref Range    Sodium 135 (L) 136 - 145 mmol/L    Potassium 3.5 3.5 - 5.1 mmol/L    Chloride 104 97 - 108 mmol/L    CO2 23 21 - 32 mmol/L    Anion gap 8 5 - 15 mmol/L    Glucose 80 65 - 100 mg/dL    BUN 6 6 - 20 MG/DL    Creatinine 0.32 (L) 0.55 - 1.02 MG/DL    BUN/Creatinine ratio 19 12 - 20      GFR est AA >60 >60 ml/min/1.73m2    GFR est non-AA >60 >60 ml/min/1.73m2    Calcium 9.0 8.5 - 10.1 MG/DL    Bilirubin, total 0.7 0.2 - 1.0 MG/DL    ALT (SGPT) 36 12 - 78 U/L    AST (SGOT) 19 15 - 37 U/L    Alk.  phosphatase 84 45 - 117 U/L    Protein, total 7.2 6.4 - 8.2 g/dL    Albumin 3.5 3.5 - 5.0 g/dL    Globulin 3.7 2.0 - 4.0 g/dL    A-G Ratio 0.9 (L) 1.1 - 2.2     CBC W/O DIFF Collection Time: 06/28/17  4:16 PM   Result Value Ref Range    WBC 10.7 3.6 - 11.0 K/uL    RBC 4.15 3.80 - 5.20 M/uL    HGB 12.2 11.5 - 16.0 g/dL    HCT 34.3 (L) 35.0 - 47.0 %    MCV 82.7 80.0 - 99.0 FL    MCH 29.4 26.0 - 34.0 PG    MCHC 35.6 30.0 - 36.5 g/dL    RDW 13.7 11.5 - 14.5 %    PLATELET 580 066 - 112 K/uL              Opportunity for questions and clarifications were given to the incoming nurse. Patient's bed is in low position, side rails x2, door open PRN, call bell within reach and patient not in distress.       Cathie San RN
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
## 2017-06-29 ENCOUNTER — APPOINTMENT (OUTPATIENT)
Dept: ULTRASOUND IMAGING | Age: 22
DRG: 566 | End: 2017-06-29
Attending: SPECIALIST
Payer: MEDICAID

## 2017-06-29 PROCEDURE — 74011000258 HC RX REV CODE- 258: Performed by: SPECIALIST

## 2017-06-29 PROCEDURE — 76705 ECHO EXAM OF ABDOMEN: CPT

## 2017-06-29 PROCEDURE — 74011250637 HC RX REV CODE- 250/637: Performed by: SPECIALIST

## 2017-06-29 PROCEDURE — 74011000250 HC RX REV CODE- 250: Performed by: SPECIALIST

## 2017-06-29 PROCEDURE — 74011250636 HC RX REV CODE- 250/636: Performed by: SPECIALIST

## 2017-06-29 PROCEDURE — 99218 HC RM OBSERVATION: CPT

## 2017-06-29 RX ORDER — OXYCODONE AND ACETAMINOPHEN 5; 325 MG/1; MG/1
1 TABLET ORAL
Status: DISCONTINUED | OUTPATIENT
Start: 2017-06-29 | End: 2017-07-01 | Stop reason: HOSPADM

## 2017-06-29 RX ADMIN — OXYCODONE HYDROCHLORIDE AND ACETAMINOPHEN 1 TABLET: 5; 325 TABLET ORAL at 12:47

## 2017-06-29 RX ADMIN — ZOLPIDEM TARTRATE 5 MG: 5 TABLET ORAL at 21:16

## 2017-06-29 RX ADMIN — CEFAZOLIN SODIUM 1 G: 1 INJECTION, POWDER, FOR SOLUTION INTRAMUSCULAR; INTRAVENOUS at 14:57

## 2017-06-29 RX ADMIN — CEFAZOLIN SODIUM 1 G: 1 INJECTION, POWDER, FOR SOLUTION INTRAMUSCULAR; INTRAVENOUS at 22:00

## 2017-06-29 RX ADMIN — ONDANSETRON HYDROCHLORIDE 4 MG: 2 INJECTION, SOLUTION INTRAMUSCULAR; INTRAVENOUS at 12:47

## 2017-06-29 RX ADMIN — SODIUM CHLORIDE 10 MG: 9 INJECTION INTRAMUSCULAR; INTRAVENOUS; SUBCUTANEOUS at 17:43

## 2017-06-29 RX ADMIN — Medication 10 ML: at 14:26

## 2017-06-29 RX ADMIN — OXYCODONE HYDROCHLORIDE AND ACETAMINOPHEN 1 TABLET: 5; 325 TABLET ORAL at 17:44

## 2017-06-29 RX ADMIN — OXYCODONE HYDROCHLORIDE AND ACETAMINOPHEN 1 TABLET: 5; 325 TABLET ORAL at 21:16

## 2017-06-29 NOTE — PROGRESS NOTES
Visited by Risa Jain Partner 6/29/17.     Sydnie Reeder, Lead [facility] Paging Service  [phone]  '

## 2017-06-29 NOTE — PROGRESS NOTES
High Risk Obstetrics Progress Note    Name: Margarette Reyez MRN: [id number]  SSN: [id number]    YOB: 1995  Age: 20 y.o. Sex: female      Subjective:      LOS: 0 days    Estimated Date of Delivery: None noted. Gestational Age Today: Unknown     Patient admitted for hyperemesis gravidarum. States she doesn't feel much better. Objective:     Vitals:  Blood pressure 100/60, pulse 75, temperature 98 °F (36.7 °C), resp. rate 15, SpO2 100 %, not currently breastfeeding. Temp (24hrs), Av °F (36.7 °C), Min:97.7 °F (36.5 °C), Max:98.3 °F (29.3 °C)    Systolic (51HXK), QCF:725 , Min:99 , VSU:365      Diastolic (98TQD), WDZ:10, Min:50, Max:61       Intake and Output:          Physical Exam:  Deferred       Membranes:  Intact    Uterine Activity:  n/a    Fetal Heart Rate:  Not checked today        Labs:   Recent Results (from the past 36 hour(s))   METABOLIC PANEL, COMPREHENSIVE    Collection Time: 17  4:16 PM   Result Value Ref Range    Sodium 135 (L) 136 - 145 mmol/L    Potassium 3.5 3.5 - 5.1 mmol/L    Chloride 104 97 - 108 mmol/L    CO2 23 21 - 32 mmol/L    Anion gap 8 5 - 15 mmol/L    Glucose 80 65 - 100 mg/dL    BUN 6 6 - 20 MG/DL    Creatinine 0.32 (L) 0.55 - 1.02 MG/DL    BUN/Creatinine ratio 19 12 - 20      GFR est AA >60 >60 ml/min/1.73m2    GFR est non-AA >60 >60 ml/min/1.73m2    Calcium 9.0 8.5 - 10.1 MG/DL    Bilirubin, total 0.7 0.2 - 1.0 MG/DL    ALT (SGPT) 36 12 - 78 U/L    AST (SGOT) 19 15 - 37 U/L    Alk.  phosphatase 84 45 - 117 U/L    Protein, total 7.2 6.4 - 8.2 g/dL    Albumin 3.5 3.5 - 5.0 g/dL    Globulin 3.7 2.0 - 4.0 g/dL    A-G Ratio 0.9 (L) 1.1 - 2.2     CBC W/O DIFF    Collection Time: 17  4:16 PM   Result Value Ref Range    WBC 10.7 3.6 - 11.0 K/uL    RBC 4.15 3.80 - 5.20 M/uL    HGB 12.2 11.5 - 16.0 g/dL    HCT 34.3 (L) 35.0 - 47.0 %    MCV 82.7 80.0 - 99.0 FL    MCH 29.4 26.0 - 34.0 PG    MCHC 35.6 30.0 - 36.5 g/dL    RDW 13.7 11.5 - 14.5 %    PLATELET 256 359 - 400 K/uL   URINALYSIS W/MICROSCOPIC    Collection Time: 06/28/17  8:33 PM   Result Value Ref Range    Color DARK YELLOW      Appearance CLOUDY (A) CLEAR      Specific gravity 1.030 1.003 - 1.030      pH (UA) 6.0 5.0 - 8.0      Protein TRACE (A) NEG mg/dL    Glucose NEGATIVE  NEG mg/dL    Ketone >80 (A) NEG mg/dL    Bilirubin NEGATIVE  NEG      Blood NEGATIVE  NEG      Urobilinogen 1.0 0.2 - 1.0 EU/dL    Nitrites NEGATIVE  NEG      Leukocyte Esterase TRACE (A) NEG      WBC 10-20 0 - 4 /hpf    RBC 5-10 0 - 5 /hpf    Epithelial cells MODERATE (A) FEW /lpf    Bacteria 2+ (A) NEG /hpf       Assessment and Plan:       Active Problems:    Hyperemesis (6/28/2017)       continues n-v, probable uti, will start ancef    Signed By: Eduardo Bermudez MD     June 29, 2017

## 2017-06-30 PROBLEM — O21.0 HYPEREMESIS GRAVIDARUM: Status: ACTIVE | Noted: 2017-06-30

## 2017-06-30 PROCEDURE — 74011000250 HC RX REV CODE- 250: Performed by: SPECIALIST

## 2017-06-30 PROCEDURE — 74011250636 HC RX REV CODE- 250/636: Performed by: SPECIALIST

## 2017-06-30 PROCEDURE — 74011000258 HC RX REV CODE- 258: Performed by: SPECIALIST

## 2017-06-30 PROCEDURE — 74011250637 HC RX REV CODE- 250/637: Performed by: SPECIALIST

## 2017-06-30 PROCEDURE — 99218 HC RM OBSERVATION: CPT

## 2017-06-30 PROCEDURE — 65270000015 HC RM PRIVATE ONCOLOGY

## 2017-06-30 RX ORDER — SODIUM CHLORIDE, SODIUM LACTATE, POTASSIUM CHLORIDE, CALCIUM CHLORIDE 600; 310; 30; 20 MG/100ML; MG/100ML; MG/100ML; MG/100ML
125 INJECTION, SOLUTION INTRAVENOUS CONTINUOUS
Status: DISCONTINUED | OUTPATIENT
Start: 2017-06-30 | End: 2017-07-01 | Stop reason: HOSPADM

## 2017-06-30 RX ADMIN — SODIUM CHLORIDE, SODIUM LACTATE, POTASSIUM CHLORIDE, AND CALCIUM CHLORIDE 200 ML/HR: 600; 310; 30; 20 INJECTION, SOLUTION INTRAVENOUS at 10:47

## 2017-06-30 RX ADMIN — OXYCODONE HYDROCHLORIDE AND ACETAMINOPHEN 1 TABLET: 5; 325 TABLET ORAL at 08:17

## 2017-06-30 RX ADMIN — CEFAZOLIN SODIUM 1 G: 1 INJECTION, POWDER, FOR SOLUTION INTRAMUSCULAR; INTRAVENOUS at 06:14

## 2017-06-30 RX ADMIN — SODIUM CHLORIDE, SODIUM LACTATE, POTASSIUM CHLORIDE, AND CALCIUM CHLORIDE 125 ML/HR: 600; 310; 30; 20 INJECTION, SOLUTION INTRAVENOUS at 22:29

## 2017-06-30 RX ADMIN — CEFAZOLIN SODIUM 1 G: 1 INJECTION, POWDER, FOR SOLUTION INTRAMUSCULAR; INTRAVENOUS at 22:27

## 2017-06-30 RX ADMIN — ZOLPIDEM TARTRATE 5 MG: 5 TABLET ORAL at 22:39

## 2017-06-30 RX ADMIN — OXYCODONE HYDROCHLORIDE AND ACETAMINOPHEN 1 TABLET: 5; 325 TABLET ORAL at 18:19

## 2017-06-30 RX ADMIN — ONDANSETRON HYDROCHLORIDE 4 MG: 2 INJECTION, SOLUTION INTRAMUSCULAR; INTRAVENOUS at 22:35

## 2017-06-30 RX ADMIN — CEFAZOLIN SODIUM 1 G: 1 INJECTION, POWDER, FOR SOLUTION INTRAMUSCULAR; INTRAVENOUS at 13:28

## 2017-06-30 RX ADMIN — SODIUM CHLORIDE 10 MG: 9 INJECTION INTRAMUSCULAR; INTRAVENOUS; SUBCUTANEOUS at 08:17

## 2017-06-30 RX ADMIN — SODIUM CHLORIDE, SODIUM LACTATE, POTASSIUM CHLORIDE, AND CALCIUM CHLORIDE 200 ML/HR: 600; 310; 30; 20 INJECTION, SOLUTION INTRAVENOUS at 16:39

## 2017-06-30 RX ADMIN — Medication 10 ML: at 22:37

## 2017-06-30 RX ADMIN — Medication 10 ML: at 13:28

## 2017-06-30 RX ADMIN — OXYCODONE HYDROCHLORIDE AND ACETAMINOPHEN 1 TABLET: 5; 325 TABLET ORAL at 22:39

## 2017-06-30 RX ADMIN — ONDANSETRON HYDROCHLORIDE 4 MG: 2 INJECTION, SOLUTION INTRAMUSCULAR; INTRAVENOUS at 18:19

## 2017-06-30 RX ADMIN — SODIUM CHLORIDE 10 MG: 9 INJECTION INTRAMUSCULAR; INTRAVENOUS; SUBCUTANEOUS at 02:00

## 2017-06-30 RX ADMIN — OXYCODONE HYDROCHLORIDE AND ACETAMINOPHEN 1 TABLET: 5; 325 TABLET ORAL at 13:27

## 2017-06-30 RX ADMIN — OXYCODONE HYDROCHLORIDE AND ACETAMINOPHEN 1 TABLET: 5; 325 TABLET ORAL at 01:58

## 2017-06-30 RX ADMIN — ONDANSETRON HYDROCHLORIDE 4 MG: 2 INJECTION, SOLUTION INTRAMUSCULAR; INTRAVENOUS at 13:30

## 2017-06-30 NOTE — PROGRESS NOTES
Oncology Nursing Communication Tool        6/30/2017     Bedside shift change report given to Maged Perez RN (incoming nurse) by Coleen Hernandez (outgoing nurse) on Belkys Watkins a 20 y.o. female who was admitted on 6/28/2017  3:17 PM. Report included the following information SBAR, Kardex, Intake/Output, MAR, Accordion and Recent Results. Significant changes during shift: No vomiting today; however, the patient was given 2 doses of PRN Zofran IV and one dose of Compazine IV on day shift. In addition, patient complained of right flank pain and required PRN Percocet three times today. Issues for physician to address: Plans/criteria for discharge. Thank you. Code Status: Full Code     Infections: No current active infections     Allergies: Latex; Monistat 1 (tioconazole) [tioconazole]; and Sulfa (sulfonamide antibiotics)     Current diet: DIET REGULAR       Pain Controlled [x] yes [] no   Bowel Movement [x] yes [] no   Last Bowel Movement (date) 06/29/2017            Vital Signs:   Patient Vitals for the past 12 hrs:   Temp Pulse Resp BP SpO2   06/30/17 1430 97.8 °F (36.6 °C) 61 18 109/61 99 %   06/30/17 0609 98.4 °F (36.9 °C) 66 17 98/65 98 %      Intake & Output:   No intake or output data in the 24 hours ending 06/30/17 1728   Laboratory Results:   No results found for this or any previous visit (from the past 12 hour(s)). Opportunity for questions and clarifications were given to the incoming nurse. Patient's bed is in low position, side rails x2, door open PRN, call bell within reach and patient not in distress.       Coleen Hernandez

## 2017-06-30 NOTE — PROGRESS NOTES
Ante Partum Progress Note    Lalo Morrow  Unknown    Patient states she has no new complaints    Vitals: Patient Vitals for the past 24 hrs:   BP Temp Pulse Resp SpO2   06/30/17 0609 98/65 98.4 °F (36.9 °C) 66 17 98 %   06/29/17 2210 108/61 98.4 °F (36.9 °C) 68 16 100 %   06/29/17 1438 138/74 98.2 °F (36.8 °C) 83 16 96 %       Intake and Output:   Current shift:     Last 3 completed shifts:      Non stress test:  Reactive    Uterine Activity: None     Exam:  Patient without distress.      Abdomen, fundus soft non-tender     Extremities, no redness or tenderness               Additional Exam: Patient without distress    Labs:     Lab Results   Component Value Date/Time    WBC 10.7 06/28/2017 04:16 PM    WBC 8.4 01/26/2017 04:33 PM    WBC 7.1 01/04/2017 11:32 PM    WBC 6.5 12/02/2016 02:51 PM    WBC 13.3 10/19/2016 08:07 PM    WBC 12.8 12/14/2015 07:10 PM    WBC 12.9 11/03/2015 03:20 PM    WBC 10.4 08/11/2015 03:15 PM    WBC 8.6 04/29/2015 10:00 PM    WBC 8.3 04/10/2015 05:10 AM    WBC 9.9 10/08/2014 11:45 AM    WBC 15.2 11/25/2013 03:45 PM    HGB 12.2 06/28/2017 04:16 PM    HGB 14.0 01/26/2017 04:33 PM    HGB 13.7 01/04/2017 11:32 PM    HGB 12.8 12/02/2016 02:51 PM    HGB 15.6 10/19/2016 08:07 PM    HGB 12.1 12/14/2015 07:10 PM    HGB 13.7 11/03/2015 03:20 PM    HGB 14.5 08/11/2015 03:15 PM    HGB 12.1 04/29/2015 10:00 PM    HGB 10.4 04/10/2015 05:10 AM    HGB 13.3 10/08/2014 11:45 AM    HGB 13.7 11/25/2013 03:45 PM    HCT 34.3 06/28/2017 04:16 PM    HCT 41.4 01/26/2017 04:33 PM    HCT 41.0 01/04/2017 11:32 PM    HCT 38.4 12/02/2016 02:51 PM    HCT 44.1 10/19/2016 08:07 PM    HCT 35.8 12/14/2015 07:10 PM    HCT 41.5 11/03/2015 03:20 PM    HCT 42.9 08/11/2015 03:15 PM    HCT 34.7 04/29/2015 10:00 PM    HCT 31.0 04/10/2015 05:10 AM    HCT 37.9 10/08/2014 11:45 AM    HCT 39.7 11/25/2013 03:45 PM    PLATELET 157 83/74/9552 04:16 PM    PLATELET 971 94/94/2302 04:33 PM    PLATELET 606 08/07/6301 11:32 PM    PLATELET 852 12/02/2016 02:51 PM    PLATELET 215 29/29/7032 08:07 PM    PLATELET 893 61/37/0632 07:10 PM    PLATELET 403 91/65/1729 03:20 PM    PLATELET 742 25/61/1223 03:15 PM    PLATELET 918 57/95/7566 10:00 PM    PLATELET 689 53/73/1047 05:10 AM    PLATELET 513 55/11/7879 11:45 AM    PLATELET 236 32/55/5366 03:45 PM       No results found for this or any previous visit (from the past 24 hour(s)).     Assessment: 20 weeks   Hyperemesis and Urinary tract infection    Plan:   Ancef  IVF  Antiemetics  Continue hospitalization with hospitalized bedrest

## 2017-06-30 NOTE — PHYSICIAN ADVISORY
Letter of Status Determination:   Recommend hospitalization status upgraded from   Observation to Inpatient  Status     Pt Name:  Tyler Hoffman   MR#  [id number]   [facility]#   [id number]   [address] [place] [address]  @ [facility]   Hospitalization date  6/28/2017  3:17 PM   Current Attending Physician  Missy Gomes MD   Principal diagnosis  <principal problem not specified>   Hyperemesis Gravidarum    Clinicals  20 y.o. y.o  female hospitalized with above diagnosis   The pt has been receiving high dose IVF @ 200 ml/hr and she has required IV antiemetic nearly round the clock for her symptoms of N/V      Milliman (MCG) criteria   Does  NOT apply    STATUS DETERMINATION  Based on documented presenting clinical data, comorbid conditions, high risk of adverse events and deterioration, it is our recommendation that the patient's status should be upgraded from OBSERVATION to INPATIENT status. The final decision of the patient's hospitalization status depends on the attending physician's judgment.          Additional comments     Payor: [address] [place] [address] / Plan: Annie Bingham / Product Type: Medicaid /         Celestino Baumgarten MD MPH FACP     Physician Advisor    [address]   [facility] Staff, [address] [place] [address]    Cell  [phone]

## 2017-07-01 VITALS
TEMPERATURE: 98 F | RESPIRATION RATE: 16 BRPM | DIASTOLIC BLOOD PRESSURE: 64 MMHG | SYSTOLIC BLOOD PRESSURE: 103 MMHG | OXYGEN SATURATION: 100 % | HEART RATE: 68 BPM

## 2017-07-01 PROCEDURE — 74011250637 HC RX REV CODE- 250/637: Performed by: SPECIALIST

## 2017-07-01 PROCEDURE — 74011250636 HC RX REV CODE- 250/636: Performed by: SPECIALIST

## 2017-07-01 PROCEDURE — 74011000250 HC RX REV CODE- 250: Performed by: SPECIALIST

## 2017-07-01 PROCEDURE — 74011000258 HC RX REV CODE- 258: Performed by: SPECIALIST

## 2017-07-01 RX ORDER — ZOLPIDEM TARTRATE 5 MG/1
5 TABLET ORAL
Qty: 10 TAB | Refills: 0 | Status: SHIPPED | OUTPATIENT
Start: 2017-07-01 | End: 2017-09-23

## 2017-07-01 RX ORDER — PROCHLORPERAZINE MALEATE 5 MG
10 TABLET ORAL
Qty: 30 TAB | Refills: 0 | Status: SHIPPED | OUTPATIENT
Start: 2017-07-01 | End: 2017-07-08

## 2017-07-01 RX ORDER — PROCHLORPERAZINE MALEATE 5 MG
5 TABLET ORAL
Status: DISCONTINUED | OUTPATIENT
Start: 2017-07-01 | End: 2017-07-01 | Stop reason: HOSPADM

## 2017-07-01 RX ADMIN — SODIUM CHLORIDE 10 MG: 9 INJECTION INTRAMUSCULAR; INTRAVENOUS; SUBCUTANEOUS at 07:16

## 2017-07-01 RX ADMIN — Medication 10 ML: at 05:55

## 2017-07-01 RX ADMIN — OXYCODONE HYDROCHLORIDE AND ACETAMINOPHEN 1 TABLET: 5; 325 TABLET ORAL at 07:31

## 2017-07-01 RX ADMIN — ONDANSETRON HYDROCHLORIDE 4 MG: 2 INJECTION, SOLUTION INTRAMUSCULAR; INTRAVENOUS at 05:55

## 2017-07-01 RX ADMIN — OXYCODONE HYDROCHLORIDE AND ACETAMINOPHEN 1 TABLET: 5; 325 TABLET ORAL at 05:55

## 2017-07-01 RX ADMIN — CEFAZOLIN SODIUM 1 G: 1 INJECTION, POWDER, FOR SOLUTION INTRAMUSCULAR; INTRAVENOUS at 05:53

## 2017-07-01 NOTE — PROGRESS NOTES
Patient discharged to home. Discharge teaching conducted at the bedside with the patient and included follow up appointments, diet and activity recommendations, and medication education. Medication education provided on Ambien and Compazine and included dosage, timing, safety and monitoring, and potential side effects. Patient teaching also provided on preventing urinary tract infections and included hygiene, wiping front to back, and urinating after sexual intercourse. The patient was encouraged to follow up with her physician regarding urinary hesitancy, given that she is being treated for a urinary tract infection this admission. The patient provided teach back of the education provided and stated that she had no further questions or concerns at the time of discharge.

## 2017-07-01 NOTE — PROGRESS NOTES
Ante Partum Progress Note    Bishop Guan  Unknown    Patient states she has no new complaints    Vitals: Patient Vitals for the past 24 hrs:   BP Temp Pulse Resp SpO2   07/01/17 0538 103/64 98 °F (36.7 °C) 68 16 100 %   06/30/17 2237 119/69 98.6 °F (37 °C) 72 14 98 %   06/30/17 1430 109/61 97.8 °F (36.6 °C) 61 18 99 %       Intake and Output:   Current shift:     Last 3 completed shifts:      Non stress test:  Reactive    Uterine Activity: None     Exam:  Patient without distress.      Abdomen, fundus soft non-tender     Extremities, no redness or tenderness               Additional Exam: Patient without distress    Labs:     Lab Results   Component Value Date/Time    WBC 10.7 06/28/2017 04:16 PM    WBC 8.4 01/26/2017 04:33 PM    WBC 7.1 01/04/2017 11:32 PM    WBC 6.5 12/02/2016 02:51 PM    WBC 13.3 10/19/2016 08:07 PM    WBC 12.8 12/14/2015 07:10 PM    WBC 12.9 11/03/2015 03:20 PM    WBC 10.4 08/11/2015 03:15 PM    WBC 8.6 04/29/2015 10:00 PM    WBC 8.3 04/10/2015 05:10 AM    WBC 9.9 10/08/2014 11:45 AM    WBC 15.2 11/25/2013 03:45 PM    HGB 12.2 06/28/2017 04:16 PM    HGB 14.0 01/26/2017 04:33 PM    HGB 13.7 01/04/2017 11:32 PM    HGB 12.8 12/02/2016 02:51 PM    HGB 15.6 10/19/2016 08:07 PM    HGB 12.1 12/14/2015 07:10 PM    HGB 13.7 11/03/2015 03:20 PM    HGB 14.5 08/11/2015 03:15 PM    HGB 12.1 04/29/2015 10:00 PM    HGB 10.4 04/10/2015 05:10 AM    HGB 13.3 10/08/2014 11:45 AM    HGB 13.7 11/25/2013 03:45 PM    HCT 34.3 06/28/2017 04:16 PM    HCT 41.4 01/26/2017 04:33 PM    HCT 41.0 01/04/2017 11:32 PM    HCT 38.4 12/02/2016 02:51 PM    HCT 44.1 10/19/2016 08:07 PM    HCT 35.8 12/14/2015 07:10 PM    HCT 41.5 11/03/2015 03:20 PM    HCT 42.9 08/11/2015 03:15 PM    HCT 34.7 04/29/2015 10:00 PM    HCT 31.0 04/10/2015 05:10 AM    HCT 37.9 10/08/2014 11:45 AM    HCT 39.7 11/25/2013 03:45 PM    PLATELET 344 96/85/3014 04:16 PM    PLATELET 074 39/32/0383 04:33 PM    PLATELET 794 58/70/8326 11:32 PM    PLATELET 093 12/02/2016 02:51 PM    PLATELET 866 21/59/4242 08:07 PM    PLATELET 627 39/94/4382 07:10 PM    PLATELET 792 67/58/3666 03:20 PM    PLATELET 682 17/49/8937 03:15 PM    PLATELET 268 56/58/0926 10:00 PM    PLATELET 511 04/92/2524 05:10 AM    PLATELET 241 40/05/9083 11:45 AM    PLATELET 678 87/14/2329 03:45 PM       No results found for this or any previous visit (from the past 24 hour(s)). Assessment: Unknown   Hyperemesis    Plan:  Discharge home with the following: Activity: ad maria e Diet: as tolerated. Follow up in office: next week. Medications: prenatal vitamins.

## 2017-07-01 NOTE — PROGRESS NOTES
Patient was given one Percocet by night shift nurse prior to change of shift. Patient vomited into emesis bag and stated that she had thrown up the medication. This nurse was asked to verify that a pill was in the emesis bag by the night shift nurse; this nurse observed an object that resembled a white pill in the emesis. The pill that was thrown up was wasted at the [facility] with this writer as the witness, and another Percocet removed and administered to the patient at her request.  The night shift nurse documented this on the patient's MAR.

## 2017-07-01 NOTE — PROGRESS NOTES
Patient vomited about 200 mL right at shift change. Patient visibly upset and demanding to go home. This nurse informed the patient that the decision to discharge her would be up to the physician and that he would be rounding later. In addition, this nurse informed the patient that as she is still requiring IV anti emetics and just vomited, it may better for her to stay until her symptoms are better controlled. Patient still insisting on getting discharged today; this nurse reiterated that she should discuss it with the attending physician.

## 2017-07-03 LAB
HBSAG, EXTERNAL: NEGATIVE
HIV, EXTERNAL: NON REACTIVE
RPR, EXTERNAL: NON REACTIVE
RUBELLA, EXTERNAL: NORMAL

## 2017-07-05 NOTE — DISCHARGE SUMMARY
Antepartum Discharge Summary     Name: Amado Solis MRN: [id number]  SSN: [id number]    YOB: 1995  Age: 20 y.o. Sex: female      Admit Date: 6/28/2017    Discharge Date: 7/5/2017     Admitting Physician: Eyal Pineda MD     Attending Physician:  No att. providers found     * Admission Diagnoses: Hyperemesis  Hyperemesis  Hyperemesis gravidarum    * Discharge Diagnoses:   Hospital Problems as of 7/1/2017  Date Reviewed: 2/25/2016          Codes Class Noted - Resolved POA    Hyperemesis gravidarum ICD-10-CM: O21.0  ICD-9-CM: 643.00  6/30/2017 - Present Unknown        Hyperemesis ICD-10-CM: R11.10  ICD-9-CM: 536.2  6/28/2017 - Present Unknown             Lab Results   Component Value Date/Time    Rubella, External Neg 10/17/2014    ABO,Rh A- 10/17/2014      Immunization History   Administered Date(s) Administered    Rho(D) Immune Globulin - IM 09/11/2015, 01/26/2017       * Discharge Condition: stable    * Procedures:   * No surgery found *      [facility] Course:    - Hyperemesis Gravidarum:                              - Patient admitted with nausea; improved past aggressive hydration and antiemetic therapy. * Disposition: Home    Discharge Medications:   Discharge Medication List as of 7/1/2017  8:40 AM      START taking these medications    Details   prochlorperazine (COMPAZINE) 5 mg tablet Take 2 Tabs by mouth every six (6) hours as needed for up to 7 days. , Print, Disp-30 Tab, R-0      zolpidem (AMBIEN) 5 mg tablet Take 1 Tab by mouth nightly as needed for Sleep.  Max Daily Amount: 5 mg., Print, Disp-10 Tab, R-0         CONTINUE these medications which have NOT CHANGED    Details   ibuprofen (MOTRIN) 200 mg tablet Take 800 mg by mouth every six (6) hours as needed for Pain., Historical Med      acetaminophen (ACETAMINOPHEN EXTRA STRENGTH) 500 mg tablet Take 1,000 mg by mouth every six (6) hours as needed for Pain., Historical Med      tiZANidine (ZANAFLEX) 4 mg tablet Take 1 Tab by mouth two (2) times a day for 30 days. , Normal, Disp-30 Tab, R-0      mirtazapine (REMERON) 30 mg tablet Take  by mouth nightly., Historical Med      ALPRAZolam (XANAX) 2 mg tablet Take 2 mg by mouth three (3) times daily as needed for Anxiety. , Historical Med             * Follow-up Care/Patient Instructions:   Activity: Activity as tolerated  Diet: Regular Diet  Wound Care: None needed    Follow-up Information     Follow up With Details Comments [facility]   [address] [address] [phone]             Signed By:  Helen Longoria MD     July 5, 2017

## 2017-07-07 ENCOUNTER — HOSPITAL ENCOUNTER (OUTPATIENT)
Age: 22
Setting detail: OBSERVATION
Discharge: HOME | End: 2017-07-09
Attending: EMERGENCY MEDICINE | Admitting: SPECIALIST
Payer: MEDICAID

## 2017-07-07 DIAGNOSIS — K29.90 GASTRITIS AND DUODENITIS: ICD-10-CM

## 2017-07-07 DIAGNOSIS — R11.2 INTRACTABLE VOMITING WITH NAUSEA, UNSPECIFIED VOMITING TYPE: Primary | ICD-10-CM

## 2017-07-07 PROBLEM — O21.0 HYPEREMESIS AFFECTING PREGNANCY, ANTEPARTUM: Status: ACTIVE | Noted: 2017-07-07

## 2017-07-07 LAB
APPEARANCE UR: ABNORMAL
BACTERIA URNS QL MICRO: ABNORMAL /HPF
BILIRUB UR QL: NEGATIVE
COLOR UR: ABNORMAL
EPITH CASTS URNS QL MICRO: ABNORMAL /LPF
ERYTHROCYTE [DISTWIDTH] IN BLOOD BY AUTOMATED COUNT: 13.9 % (ref 11.5–14.5)
GLUCOSE UR STRIP.AUTO-MCNC: NEGATIVE MG/DL
HCT VFR BLD AUTO: 33.6 % (ref 35–47)
HGB BLD-MCNC: 11.6 G/DL (ref 11.5–16)
HGB UR QL STRIP: NEGATIVE
KETONES UR QL STRIP.AUTO: >80 MG/DL
LEUKOCYTE ESTERASE UR QL STRIP.AUTO: ABNORMAL
MCH RBC QN AUTO: 29 PG (ref 26–34)
MCHC RBC AUTO-ENTMCNC: 34.5 G/DL (ref 30–36.5)
MCV RBC AUTO: 84 FL (ref 80–99)
MUCOUS THREADS URNS QL MICRO: ABNORMAL /LPF
NITRITE UR QL STRIP.AUTO: NEGATIVE
PH UR STRIP: 6 [PH] (ref 5–8)
PLATELET # BLD AUTO: 202 K/UL (ref 150–400)
PROT UR STRIP-MCNC: ABNORMAL MG/DL
RBC # BLD AUTO: 4 M/UL (ref 3.8–5.2)
RBC #/AREA URNS HPF: ABNORMAL /HPF (ref 0–5)
SP GR UR REFRACTOMETRY: 1.03 (ref 1–1.03)
UROBILINOGEN UR QL STRIP.AUTO: 1 EU/DL (ref 0.2–1)
WBC # BLD AUTO: 10.7 K/UL (ref 3.6–11)
WBC URNS QL MICRO: ABNORMAL /HPF (ref 0–4)

## 2017-07-07 PROCEDURE — 74011258636 HC RX REV CODE- 258/636: Performed by: SPECIALIST

## 2017-07-07 PROCEDURE — 81001 URINALYSIS AUTO W/SCOPE: CPT | Performed by: SPECIALIST

## 2017-07-07 PROCEDURE — 36415 COLL VENOUS BLD VENIPUNCTURE: CPT | Performed by: SPECIALIST

## 2017-07-07 PROCEDURE — 85027 COMPLETE CBC AUTOMATED: CPT | Performed by: SPECIALIST

## 2017-07-07 PROCEDURE — 96375 TX/PRO/DX INJ NEW DRUG ADDON: CPT

## 2017-07-07 PROCEDURE — 96361 HYDRATE IV INFUSION ADD-ON: CPT

## 2017-07-07 PROCEDURE — 74011250636 HC RX REV CODE- 250/636: Performed by: SPECIALIST

## 2017-07-07 PROCEDURE — 96360 HYDRATION IV INFUSION INIT: CPT

## 2017-07-07 PROCEDURE — 75810000275 HC EMERGENCY DEPT VISIT NO LEVEL OF CARE

## 2017-07-07 PROCEDURE — 99284 EMERGENCY DEPT VISIT MOD MDM: CPT

## 2017-07-07 PROCEDURE — 96374 THER/PROPH/DIAG INJ IV PUSH: CPT

## 2017-07-07 PROCEDURE — 99218 HC RM OBSERVATION: CPT

## 2017-07-07 RX ORDER — DEXTROSE, SODIUM CHLORIDE, SODIUM LACTATE, POTASSIUM CHLORIDE, AND CALCIUM CHLORIDE 5; .6; .31; .03; .02 G/100ML; G/100ML; G/100ML; G/100ML; G/100ML
150 INJECTION, SOLUTION INTRAVENOUS CONTINUOUS
Status: DISCONTINUED | OUTPATIENT
Start: 2017-07-07 | End: 2017-07-09 | Stop reason: HOSPADM

## 2017-07-07 RX ORDER — OXYCODONE HYDROCHLORIDE 5 MG/1
5 TABLET ORAL
COMMUNITY
End: 2017-09-23

## 2017-07-07 RX ORDER — ACETAMINOPHEN 325 MG/1
650 TABLET ORAL
Status: DISCONTINUED | OUTPATIENT
Start: 2017-07-07 | End: 2017-07-09 | Stop reason: HOSPADM

## 2017-07-07 RX ORDER — ONDANSETRON 4 MG/1
4 TABLET, FILM COATED ORAL
COMMUNITY
End: 2017-09-23

## 2017-07-07 RX ORDER — PROCHLORPERAZINE EDISYLATE 5 MG/ML
5 INJECTION INTRAMUSCULAR; INTRAVENOUS
Status: DISCONTINUED | OUTPATIENT
Start: 2017-07-07 | End: 2017-07-09 | Stop reason: HOSPADM

## 2017-07-07 RX ORDER — PROMETHAZINE HYDROCHLORIDE 25 MG/1
25 SUPPOSITORY RECTAL
Status: ON HOLD | COMMUNITY
End: 2017-10-04 | Stop reason: ALTCHOICE

## 2017-07-07 RX ORDER — ONDANSETRON 2 MG/ML
4 INJECTION INTRAMUSCULAR; INTRAVENOUS
Status: DISCONTINUED | OUTPATIENT
Start: 2017-07-07 | End: 2017-07-09 | Stop reason: HOSPADM

## 2017-07-07 RX ORDER — SODIUM CHLORIDE 0.9 % (FLUSH) 0.9 %
5-10 SYRINGE (ML) INJECTION AS NEEDED
Status: DISCONTINUED | OUTPATIENT
Start: 2017-07-07 | End: 2017-07-09 | Stop reason: HOSPADM

## 2017-07-07 RX ADMIN — PROCHLORPERAZINE EDISYLATE 5 MG: 5 INJECTION INTRAMUSCULAR; INTRAVENOUS at 21:01

## 2017-07-07 RX ADMIN — ONDANSETRON 4 MG: 2 INJECTION INTRAMUSCULAR; INTRAVENOUS at 21:01

## 2017-07-07 RX ADMIN — SODIUM CHLORIDE, SODIUM LACTATE, POTASSIUM CHLORIDE, CALCIUM CHLORIDE, AND DEXTROSE MONOHYDRATE 150 ML/HR: 600; 310; 30; 20; 5 INJECTION, SOLUTION INTRAVENOUS at 20:49
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
## 2017-07-07 NOTE — ED NOTES
7:43 PM    20 y.o. female presents ambulatory. to ED with a c/o decreased fetal movement x today, nausea, vomiting, hematemesis, abd/pelvic cramping rated 8/10 in severity, headache, lightheadedness, dizziness and a total of 7 abdominal contractions x today. She states she is 20 weeks pregnant, G5/A3 with a h/o hyperemesis and  labor. She reports no improvement at home with Diclegis, Zofran, Promethazine, or Compazine. She states she cannot tolerate any PO intake. Patient denies any gush of fluids or vaginal bleeding. She states that she has spoken with Dr. Richard Acharya, o/c Obn, and was advised to be seen in [address] [facility] L&D. Patient notes that only one of her pregnancies ended in premature labor, delivered at 34 weeks. Gravid 5 Para 1  3   LMP: 2017  approximately 20 weeks pregnant  Hx of prior: hyperemesis gravidarium,  labor, miscarriage    Is blood pressure high?: no  Swelling in legs?: no    ROS:  Constitutional: positive for decreased PO intake  Abd: positive for abd cramping, contractions, nausea, vomiting, hematemesis  : negative for gush of fluids or vaginal bleeding   Neurological: positive for headache, lightheadedness, dizziness  All other systems negative. Patient Vitals for the past 12 hrs:   Temp Pulse Resp BP SpO2   17 2100 98 °F (36.7 °C) 70 18 130/65 100 %   17 1927 97.4 °F (36.3 °C) 70 18 131/70 100 %       PE:   Gen: WDWN, Patient appears anxious, crying. CV: RRR  Resp: CTAB  Ext: No swelling  Abdomen: Gravid uterus, lower ab tenderness, no guarding/rebound tenderness    Plan:  Patient is a 19 yo female at approximately 21 wga who presents to ED with n/v with associated specks of blood in emesis. She also reports decreased fetal movement x today and abdominal/pelvic cramping feeling similar to prior contractions.   Suspect hyperemesis with gastritis but given decreased fetal movement with abdominal/pelvic pain/possible contractions and hx of multiple spontaneous abortions and one  labor, will transfer to L&D for ob evaluation and potential labor. If patient is cleared from an ob standpoint and there are any additional concerns, please send patient back to ED for further evaluation. Diagnosis:  1. Intractable vomiting with nausea, unspecified vomiting type    2. Gastritis and duodenitis          There are no other sxs or complaints noted at this time.

## 2017-07-07 NOTE — PROGRESS NOTES
Received this 20 y.o A3 at 20 weeks gestation from ED . Pt presents with c/o nausea and vomiting since 0500 pt treated for hyperemesis recently discharged form hosp. . Followed prenatally by . Pt has hx of cholestasis and hyperemesis. FHT auscultated via doppler 150 Consents obtained . Dr. Winston Scruggs to be notified  notified pt is here and orders received .  pt seen and evaluated by DR Baltazar Scruggs , orders received for observation , IV hydration and anti emetics        Bedside shift change report given to One Yayo Archuleta (oncoming nurse) by me (offgoing nurse).   Report given with SBAR,

## 2017-07-07 NOTE — ED NOTES
Pt states she is having contractions and has not felt the baby move today. Pt evaluated by Dr. Jimy Pelletier.

## 2017-07-07 NOTE — IP AVS SNAPSHOT
Current Discharge Medication List  
  
CONTINUE these medications which have CHANGED Dose & Instructions Dispensing Information Comments Morning Noon Evening Bedtime * zolpidem 5 mg tablet Commonly known as:  AMBIEN What changed:  Another medication with the same name was added. Make sure you understand how and when to take each. Your last dose was: Your next dose is:    
   
   
 Dose:  5 mg Take 1 Tab by mouth nightly as needed for Sleep. Max Daily Amount: 5 mg. Quantity:  10 Tab Refills:  0  
     
   
   
   
  
 * zolpidem 10 mg tablet Commonly known as:  AMBIEN What changed: You were already taking a medication with the same name, and this prescription was added. Make sure you understand how and when to take each. Your last dose was: Your next dose is:    
   
   
 Dose:  10 mg Take 1 Tab by mouth nightly as needed for Sleep. Max Daily Amount: 10 mg.  
 Quantity:  5 Tab Refills:  0  
     
   
   
   
  
 * Notice: This list has 2 medication(s) that are the same as other medications prescribed for you. Read the directions carefully, and ask your doctor or other care provider to review them with you. ASK your doctor about these medications Dose & Instructions Dispensing Information Comments Morning Noon Evening Bedtime ACETAMINOPHEN EXTRA STRENGTH 500 mg tablet Generic drug:  acetaminophen Your last dose was: Your next dose is:    
   
   
 Dose:  1000 mg Take 1,000 mg by mouth every six (6) hours as needed for Pain. Refills:  0  
     
   
   
   
  
 ibuprofen 200 mg tablet Commonly known as:  MOTRIN Your last dose was: Your next dose is:    
   
   
 Dose:  800 mg Take 800 mg by mouth every six (6) hours as needed for Pain. Refills:  0  
     
   
   
   
  
 ondansetron hcl 4 mg tablet Commonly known as:  Delbra Castor Your last dose was: Your next dose is: Dose:  4 mg Take 4 mg by mouth every eight (8) hours as needed for Nausea. Refills:  0  
     
   
   
   
  
 oxyCODONE IR 5 mg immediate release tablet Commonly known as:  Nosneha Gordon Your last dose was: Your next dose is:    
   
   
 Dose:  5 mg Take 5 mg by mouth every four (4) hours as needed for Pain. Refills:  0 PHENERGAN 25 mg suppository Generic drug:  promethazine Your last dose was: Your next dose is:    
   
   
 Dose:  25 mg Insert 25 mg into rectum every six (6) hours as needed for Nausea. Refills:  0  
     
   
   
   
  
 prochlorperazine 5 mg tablet Commonly known as:  COMPAZINE Ask about: Should I take this medication? Your last dose was: Your next dose is:    
   
   
 Dose:  10 mg Take 2 Tabs by mouth every six (6) hours as needed for up to 7 days. Quantity:  30 Tab Refills:  0 REMERON 30 mg tablet Generic drug:  mirtazapine Your last dose was: Your next dose is: Take  by mouth nightly. Refills:  0  
     
   
   
   
  
 tiZANidine 4 mg tablet Commonly known as:  Solitario Barrera Your last dose was: Your next dose is:    
   
   
 Dose:  4 mg Take 1 Tab by mouth two (2) times a day for 30 days. Quantity:  30 Tab Refills:  0  
     
   
   
   
  
 XANAX 2 mg tablet Generic drug:  ALPRAZolam  
   
Your last dose was: Your next dose is:    
   
   
 Dose:  2 mg Take 2 mg by mouth three (3) times daily as needed for Anxiety. Refills:  0 Where to Get Your Medications Information on where to get these meds will be given to you by the nurse or doctor. ! Ask your nurse or doctor about these medications  
  zolpidem 10 mg tablet

## 2017-07-07 NOTE — IP AVS SNAPSHOT
[address] [facility] 
[phone] Patient: Boo Rasheed MRN: [id number] PBJ:[id number] You are allergic to the following Allergen Reactions Latex Hives Monistat 1 (Tioconazole) (Tioconazole) Swelling Sulfa (Sulfonamide Antibiotics) Hives Recent Documentation Height Weight BMI OB Status Smoking Status 1.575 m 62.2 kg 25.08 kg/m2 Pregnant Former Smoker Emergency Contacts Name Discharge Info Relation Home Work Mobile [place] Wooten DISCHARGE CAREGIVER [3] Parent [1] [phone] About your hospitalization You were admitted on:  July 7, 2017 You last received care in the:  [facility] You were discharged on:  July 9, 2017 Unit phone number:  [phone] Why you were hospitalized Your primary diagnosis was:  Not on File Your diagnoses also included:  Hyperemesis Affecting Pregnancy, Antepartum Providers Seen During Your Hospitalizations Provider Role Specialty Primary office phone Job MD Dede Attending Provider Emergency Medicine [phone] Emely Handley MD Attending Provider Obstetrics & Gynecology [phone] Your Primary Care Physician (PCP) Primary Care Physician Office Phone Office Fax Ashlie Scherer [phone] Follow-up Information Follow up With Details Comments Contact Info Opal Manzano MD   [place] [address] [address] [facility] 
[phone] Current Discharge Medication List  
  
CONTINUE these medications which have CHANGED Dose & Instructions Dispensing Information Comments Morning Noon Evening Bedtime * zolpidem 5 mg tablet Commonly known as:  AMBIEN What changed:  Another medication with the same name was added. Make sure you understand how and when to take each. Your last dose was: Your next dose is:    
   
   
 Dose:  5 mg Take 1 Tab by mouth nightly as needed for Sleep. Max Daily Amount: 5 mg. Quantity:  10 Tab Refills:  0  
     
   
   
   
  
 * zolpidem 10 mg tablet Commonly known as:  AMBIEN What changed: You were already taking a medication with the same name, and this prescription was added. Make sure you understand how and when to take each. Your last dose was: Your next dose is:    
   
   
 Dose:  10 mg Take 1 Tab by mouth nightly as needed for Sleep. Max Daily Amount: 10 mg.  
 Quantity:  5 Tab Refills:  0  
     
   
   
   
  
 * Notice: This list has 2 medication(s) that are the same as other medications prescribed for you. Read the directions carefully, and ask your doctor or other care provider to review them with you. ASK your doctor about these medications Dose & Instructions Dispensing Information Comments Morning Noon Evening Bedtime ACETAMINOPHEN EXTRA STRENGTH 500 mg tablet Generic drug:  acetaminophen Your last dose was: Your next dose is:    
   
   
 Dose:  1000 mg Take 1,000 mg by mouth every six (6) hours as needed for Pain. Refills:  0  
     
   
   
   
  
 ibuprofen 200 mg tablet Commonly known as:  MOTRIN Your last dose was: Your next dose is:    
   
   
 Dose:  800 mg Take 800 mg by mouth every six (6) hours as needed for Pain. Refills:  0  
     
   
   
   
  
 ondansetron hcl 4 mg tablet Commonly known as:  [place]udson Appl Your last dose was: Your next dose is:    
   
   
 Dose:  4 mg Take 4 mg by mouth every eight (8) hours as needed for Nausea. Refills:  0  
     
   
   
   
  
 oxyCODONE IR 5 mg immediate release tablet Commonly known as:  Jimbo Penny Your last dose was: Your next dose is:    
   
   
 Dose:  5 mg Take 5 mg by mouth every four (4) hours as needed for Pain. Refills:  0  PHENERGAN 25 mg suppository Generic drug:  promethazine Your last dose was: Your next dose is:    
   
   
 Dose:  25 mg Insert 25 mg into rectum every six (6) hours as needed for Nausea. Refills:  0  
     
   
   
   
  
 prochlorperazine 5 mg tablet Commonly known as:  COMPAZINE Ask about: Should I take this medication? Your last dose was: Your next dose is:    
   
   
 Dose:  10 mg Take 2 Tabs by mouth every six (6) hours as needed for up to 7 days. Quantity:  30 Tab Refills:  0 REMERON 30 mg tablet Generic drug:  mirtazapine Your last dose was: Your next dose is: Take  by mouth nightly. Refills:  0  
     
   
   
   
  
 tiZANidine 4 mg tablet Commonly known as:  Maris Olp Your last dose was: Your next dose is:    
   
   
 Dose:  4 mg Take 1 Tab by mouth two (2) times a day for 30 days. Quantity:  30 Tab Refills:  0  
     
   
   
   
  
 XANAX 2 mg tablet Generic drug:  ALPRAZolam  
   
Your last dose was: Your next dose is:    
   
   
 Dose:  2 mg Take 2 mg by mouth three (3) times daily as needed for Anxiety. Refills:  0 Where to Get Your Medications Information on where to get these meds will be given to you by the nurse or doctor. ! Ask your nurse or doctor about these medications  
  zolpidem 10 mg tablet Discharge Instructions Extreme Nausea and Vomiting in Pregnancy: Care Instructions Your Care Instructions Nausea and vomiting (often called morning sickness) are common in pregnancy. They are caused by pregnancy hormones and happen most often in the first 3 months. Some women get very sick and are not able to keep down food and fluids. This extreme morning sickness is called hyperemesis gravidarum. It can lead to a dangerous loss of fluids in the body.  It also can keep you from gaining weight and getting proper nutrition during your pregnancy. Your body fluids are put back in balance with water and minerals called electrolytes. Medicine may help if you have severe nausea and vomiting. Follow-up care is a key part of your treatment and safety. Be sure to make and go to all appointments, and call your doctor if you are having problems. It's also a good idea to know your test results and keep a list of the medicines you take. How can you care for yourself at home? · Take your medicines exactly as prescribed. Call your doctor if you think you are having a problem with your medicine. · Drink plenty of fluids to prevent dehydration. Choose water and other caffeine-free clear liquids until you feel better. Try sipping on sports drinks that have salt and sugar in them. · Eat a small snack, such as crackers, before you get out of bed. Wait a few minutes, then get out of bed slowly. · Keep food in your stomach, but not too much at once. An empty stomach can make nausea worse. Eat several small meals every day instead of three large meals. · Eat more protein and less fat. · Get plenty of vitamin B6 by eating whole grains, nuts, seeds, and legumes. You can take vitamin B6 tablets if your doctor says it is okay. · Try to avoid smells and foods that make you feel sick to your stomach. · Get lots of rest. 
· You may want to try acupressure bands. They put pressure on an acupressure point in the wrist. Some women feel better using the bands. · Kirsten may also help you feel better. You can use it in tea, take it as a pill, or use a kirsten syrup that you can buy at a health food store. When should you call for help? Call 911 anytime you think you may need emergency care. For example, call if: 
· You passed out (lost consciousness). Call your doctor now or seek immediate medical care if: 
· You vomit more than 3 times in a day, especially if you also have a fever or pain.  
· You are too sick to your stomach to drink any fluids. · You have signs of needing more fluids. You have sunken eyes and a dry mouth, and you pass only a little dark urine. · Your morning sickness gets worse or does not get better with home care. · You are not able to keep down your medicine. Watch closely for changes in your health, and be sure to contact your doctor if you have any problems. Where can you learn more? Go to http://kirk-izzy.info/. Enter E709 in the search box to learn more about \"Extreme Nausea and Vomiting in Pregnancy: Care Instructions. \" Current as of: March 16, 2017 Content Version: 11.3 © 6949-1968 [employer]. Care instructions adapted under license by [employer] (which disclaims liability or warranty for this information). If you have questions about a medical condition or this instruction, always ask your healthcare professional. [address] any warranty or liability for your use of this information. Weeks 18 to 22 of Your Pregnancy: Care Instructions Your Care Instructions Your baby is continuing to develop quickly. At this stage, babies can now suck their thumbs,  firmly with their hands, and open and close their eyelids. Sometime between 18 and 22 weeks, you will start to feel your baby move. At first, these small fetal movements feel like fluttering or \"butterflies. \" Some women say that they feel like gas bubbles. As the baby grows, these movements will become stronger. You may also notice that your baby kicks and hiccups. During this time, you may find that your nausea and fatigue are gone. Overall, you may feel better and have more energy than you did in your first trimester. But you may also have new discomforts now, such as sleep problems or leg cramps. This care sheet can help you ease these discomforts. Follow-up care is a key part of your treatment and safety.  Be sure to make and go to all appointments, and call your doctor if you are having problems. It's also a good idea to know your test results and keep a list of the medicines you take. How can you care for yourself at home? Ease sleep problems · Avoid caffeine in drinks or chocolate late in the day. · Get some exercise every day. · Take a warm shower or bath before bed. · Have a light snack or glass of milk at bedtime. · Do relaxation exercises in bed to calm your mind and body. · Support your legs and back with extra pillows. Try a pillow between your legs if you sleep on your side. · Do not use sleeping pills or alcohol. They could harm your baby. Ease leg cramps · Do not massage your calf during the cramp. · Sit on a firm bed or chair. Straighten your leg, and bend your foot (flex your ankle) slowly upward, toward your knee. Bend your toes up and down. · Stand on a cool, flat surface. Stretch your toes upward, and take small steps walking on your heels. · Use a heating pad or hot water bottle to help with muscle ache. Prevent leg cramps · Be sure to get enough calcium. If you are worried that you are not getting enough, talk to your doctor. · Exercise every day, and stretch your legs before bed. · Take a warm bath before bed, and try leg warmers at night. Where can you learn more? Go to http://kirk-izzy.info/. Enter J672 in the search box to learn more about \"Weeks 18 to 22 of Your Pregnancy: Care Instructions. \" Current as of: March 16, 2017 Content Version: 11.3 © 1570-9337 [employer]. Care instructions adapted under license by [employer] (which disclaims liability or warranty for this information). If you have questions about a medical condition or this instruction, always ask your healthcare professional. [place] [address] any warranty or liability for your use of this information. Discharge Orders None Introducing [facility]! Dear Stormy Rosenberg:  
Thank you for requesting a [employer] account. Our records indicate that you already have an active Kadient account. You can access your account anytime at [email or web address] Did you know that you can access your hospital and ER discharge instructions at any time in [employer]ient? You can also review all of your test results from your hospital stay or ER visit. Additional Information If you have questions, please visit the Frequently Asked Questions section of the [employer] website at [email or web address] Remember, [employer]ient is NOT to be used for urgent needs. For medical emergencies, dial 911. Now available from your iPhone and Android! General Information Please provide this summary of care documentation to your next provider. Patient Signature:  ____________________________________________________________ Date:  ____________________________________________________________  
  
Jorge Gomez Provider Signature:  ____________________________________________________________ Date:  ____________________________________________________________
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
## 2017-07-08 PROCEDURE — 74011000250 HC RX REV CODE- 250: Performed by: OBSTETRICS & GYNECOLOGY

## 2017-07-08 PROCEDURE — 74011250637 HC RX REV CODE- 250/637: Performed by: OBSTETRICS & GYNECOLOGY

## 2017-07-08 PROCEDURE — C9113 INJ PANTOPRAZOLE SODIUM, VIA: HCPCS | Performed by: OBSTETRICS & GYNECOLOGY

## 2017-07-08 PROCEDURE — 74011258636 HC RX REV CODE- 258/636: Performed by: SPECIALIST

## 2017-07-08 PROCEDURE — 74011250636 HC RX REV CODE- 250/636: Performed by: OBSTETRICS & GYNECOLOGY

## 2017-07-08 PROCEDURE — 99218 HC RM OBSERVATION: CPT

## 2017-07-08 PROCEDURE — 96376 TX/PRO/DX INJ SAME DRUG ADON: CPT

## 2017-07-08 PROCEDURE — 96361 HYDRATE IV INFUSION ADD-ON: CPT

## 2017-07-08 PROCEDURE — 96375 TX/PRO/DX INJ NEW DRUG ADDON: CPT

## 2017-07-08 PROCEDURE — 74011250636 HC RX REV CODE- 250/636: Performed by: SPECIALIST

## 2017-07-08 RX ORDER — HYDROCORTISONE 1 %
CREAM (GRAM) TOPICAL 3 TIMES DAILY
Status: DISCONTINUED | OUTPATIENT
Start: 2017-07-08 | End: 2017-07-09 | Stop reason: HOSPADM

## 2017-07-08 RX ORDER — ZOLPIDEM TARTRATE 5 MG/1
5 TABLET ORAL
Status: DISCONTINUED | OUTPATIENT
Start: 2017-07-08 | End: 2017-07-09 | Stop reason: DRUGHIGH

## 2017-07-08 RX ADMIN — SODIUM CHLORIDE 40 MG: 9 INJECTION INTRAMUSCULAR; INTRAVENOUS; SUBCUTANEOUS at 08:09

## 2017-07-08 RX ADMIN — SODIUM CHLORIDE, SODIUM LACTATE, POTASSIUM CHLORIDE, CALCIUM CHLORIDE, AND DEXTROSE MONOHYDRATE 150 ML/HR: 600; 310; 30; 20; 5 INJECTION, SOLUTION INTRAVENOUS at 22:09

## 2017-07-08 RX ADMIN — PROCHLORPERAZINE EDISYLATE 5 MG: 5 INJECTION INTRAMUSCULAR; INTRAVENOUS at 07:41

## 2017-07-08 RX ADMIN — ONDANSETRON 4 MG: 2 INJECTION INTRAMUSCULAR; INTRAVENOUS at 03:26

## 2017-07-08 RX ADMIN — SODIUM CHLORIDE, SODIUM LACTATE, POTASSIUM CHLORIDE, CALCIUM CHLORIDE, AND DEXTROSE MONOHYDRATE 150 ML/HR: 600; 310; 30; 20; 5 INJECTION, SOLUTION INTRAVENOUS at 03:26

## 2017-07-08 RX ADMIN — ONDANSETRON 4 MG: 2 INJECTION INTRAMUSCULAR; INTRAVENOUS at 21:55

## 2017-07-08 RX ADMIN — SODIUM CHLORIDE, SODIUM LACTATE, POTASSIUM CHLORIDE, CALCIUM CHLORIDE, AND DEXTROSE MONOHYDRATE 150 ML/HR: 600; 310; 30; 20; 5 INJECTION, SOLUTION INTRAVENOUS at 09:18

## 2017-07-08 RX ADMIN — ONDANSETRON 4 MG: 2 INJECTION INTRAMUSCULAR; INTRAVENOUS at 07:42

## 2017-07-08 RX ADMIN — SODIUM CHLORIDE, SODIUM LACTATE, POTASSIUM CHLORIDE, CALCIUM CHLORIDE, AND DEXTROSE MONOHYDRATE 150 ML/HR: 600; 310; 30; 20; 5 INJECTION, SOLUTION INTRAVENOUS at 15:09

## 2017-07-08 RX ADMIN — Medication 10 ML: at 07:41

## 2017-07-08 RX ADMIN — ZOLPIDEM TARTRATE 5 MG: 5 TABLET ORAL at 21:55

## 2017-07-08 RX ADMIN — HYDROCORTISONE: 1 CREAM TOPICAL at 15:01

## 2017-07-08 RX ADMIN — ONDANSETRON 4 MG: 2 INJECTION INTRAMUSCULAR; INTRAVENOUS at 17:51

## 2017-07-08 NOTE — PROGRESS NOTES
Dr Candie Andrews at , made aware of pt's blood glucose level and did not reorder CMP, protonix ordered for pt

## 2017-07-08 NOTE — PROGRESS NOTES
Spiritual Care Assessment/Progress Notes    Morris Grief [id number]  [id number]    1995  20 y.o.  female    Patient Telephone Number: [phone] (home)   Episcopalian Affiliation: [facility]   Language: Kim Dallas   Extended Emergency Contact Information  Primary Emergency Contact: Brooklyn Wooten  Address: [address] [place] [address], [address] [place] [address] Drive Phone: [phone]  Relation: Parent   Patient Active Problem List    Diagnosis Date Noted    Hyperemesis affecting pregnancy, antepartum 07/07/2017    Hyperemesis gravidarum 06/30/2017    Hyperemesis 06/28/2017    Murmur, cardiac 01/19/2016    Urinary hesitancy 12/15/2015    URI (upper respiratory infection) 12/15/2015    Cough 12/15/2015    Panic attacks     Depression         Date: 7/8/2017       Level of Episcopalian/Spiritual Activity:  []         Involved in horace tradition/spiritual practice    []         Not involved in horace tradition/spiritual practice  [x]         Spiritually oriented    []         Claims no spiritual orientation    []         seeking spiritual identity  []         Feels alienated from Gnosticism practice/tradition  []         Feels angry about Gnosticism practice/tradition  [x]         Spirituality/Gnosticism tradition is a resource for coping at this time.   []         Not able to assess due to medical condition    Services Provided Today:  []         crisis intervention    []         reading Scriptures  [x]         spiritual assessment    [x]         prayer  [x]         empathic listening/emotional support  []         rites and rituals (cite in comments)  []         life review     []         Gnosticism support  []         theological development    []         advocacy  []         ethical dialog     []         blessing  []         bereavement support    []         support to family  []         anticipatory grief support   []         help with AMD  []         spiritual guidance    [] meditation      Spiritual Care Needs  [x]         Emotional Support  [x]         Spiritual/Jain Care  []         Loss/Adjustment  []         Advocacy/Referral                /Ethics  []         No needs expressed at               this time  []         Other: (note in               comments)  [address]  []         Follow up visits with               pt/family  []         Provide materials  []         Schedule sacraments  []         Contact Community               Clergy  [x]         Follow up as needed  []         Other: (note in               comments)     Comments:  Initial visit on L&D for spiritual assessment. No visitors present. Provided listening presence and support as patient shared about events that led to hospitalization. She has a two year old son. Patient indicated she has good spiritual support. She was receptive to assurance of prayer. Advised her of  availability.   SEYMOUR Hansen, [facility], [address] [facility] [address]     Paging Service  [phone])

## 2017-07-08 NOTE — PROGRESS NOTES
Bedside and Verbal shift change report given to RN Sujey Benavides (oncoming nurse) by Maximilian Miller RN  (offgoing nurse). Report given with Bayron SMALLS and MAR.

## 2017-07-08 NOTE — PROGRESS NOTES
Call received form Lab and said TSH and CMP hemolyzed and need a new order and new draw, pt seen sleeping in bed after meds, pt states \"dont want anything done now, I have not sleeping in days\", will try later

## 2017-07-08 NOTE — H&P
Ante Partum Progress Note    Boo Rasheed  20w1d    Patient states she has no new complaints    Vitals: Patient Vitals for the past 24 hrs:   BP Temp Pulse Resp SpO2 Height Weight   07/07/17 2100 130/65 98 °F (36.7 °C) 70 18 100 % - -   07/07/17 1927 131/70 97.4 °F (36.3 °C) 70 18 100 % 5' 2\" (1.575 m) 62.2 kg (137 lb 2 oz)       Intake and Output:   Current shift:  07/07 1901 - 07/08 0700  In: -   Out: 500 [Urine:500]  Last 3 completed shifts:      Non stress test:  Non-reactive but appropriate for gestational age    Uterine Activity: None     Exam:  Patient without distress.      Abdomen, fundus soft non-tender     Extremities, no redness or tenderness               Additional Exam: Patient without distress    Labs:     Lab Results   Component Value Date/Time    WBC 10.7 07/07/2017 08:47 PM    WBC 10.7 06/28/2017 04:16 PM    WBC 8.4 01/26/2017 04:33 PM    WBC 7.1 01/04/2017 11:32 PM    WBC 6.5 12/02/2016 02:51 PM    WBC 13.3 10/19/2016 08:07 PM    WBC 12.8 12/14/2015 07:10 PM    WBC 12.9 11/03/2015 03:20 PM    WBC 10.4 08/11/2015 03:15 PM    WBC 8.6 04/29/2015 10:00 PM    WBC 8.3 04/10/2015 05:10 AM    WBC 9.9 10/08/2014 11:45 AM    WBC 15.2 11/25/2013 03:45 PM    HGB 11.6 07/07/2017 08:47 PM    HGB 12.2 06/28/2017 04:16 PM    HGB 14.0 01/26/2017 04:33 PM    HGB 13.7 01/04/2017 11:32 PM    HGB 12.8 12/02/2016 02:51 PM    HGB 15.6 10/19/2016 08:07 PM    HGB 12.1 12/14/2015 07:10 PM    HGB 13.7 11/03/2015 03:20 PM    HGB 14.5 08/11/2015 03:15 PM    HGB 12.1 04/29/2015 10:00 PM    HGB 10.4 04/10/2015 05:10 AM    HGB 13.3 10/08/2014 11:45 AM    HGB 13.7 11/25/2013 03:45 PM    HCT 33.6 07/07/2017 08:47 PM    HCT 34.3 06/28/2017 04:16 PM    HCT 41.4 01/26/2017 04:33 PM    HCT 41.0 01/04/2017 11:32 PM    HCT 38.4 12/02/2016 02:51 PM    HCT 44.1 10/19/2016 08:07 PM    HCT 35.8 12/14/2015 07:10 PM    HCT 41.5 11/03/2015 03:20 PM    HCT 42.9 08/11/2015 03:15 PM    HCT 34.7 04/29/2015 10:00 PM    HCT 31.0 04/10/2015 05:10 AM    HCT 37.9 10/08/2014 11:45 AM    HCT 39.7 11/25/2013 03:45 PM    PLATELET 312 99/25/0517 08:47 PM    PLATELET 567 24/29/1061 04:16 PM    PLATELET 801 31/78/5866 04:33 PM    PLATELET 990 70/03/9039 11:32 PM    PLATELET 342 99/72/1444 02:51 PM    PLATELET 566 06/27/6762 08:07 PM    PLATELET 755 74/60/1840 07:10 PM    PLATELET 007 15/62/5998 03:20 PM    PLATELET 331 47/00/1744 03:15 PM    PLATELET 646 20/44/9110 10:00 PM    PLATELET 657 53/87/8601 05:10 AM    PLATELET 721 20/38/6431 11:45 AM    PLATELET 248 00/68/3194 03:45 PM       Recent Results (from the past 24 hour(s))   CBC W/O DIFF    Collection Time: 07/07/17  8:47 PM   Result Value Ref Range    WBC 10.7 3.6 - 11.0 K/uL    RBC 4.00 3.80 - 5.20 M/uL    HGB 11.6 11.5 - 16.0 g/dL    HCT 33.6 (L) 35.0 - 47.0 %    MCV 84.0 80.0 - 99.0 FL    MCH 29.0 26.0 - 34.0 PG    MCHC 34.5 30.0 - 36.5 g/dL    RDW 13.9 11.5 - 14.5 %    PLATELET 271 727 - 893 K/uL   URINALYSIS W/MICROSCOPIC    Collection Time: 07/07/17  8:47 PM   Result Value Ref Range    Color YELLOW/STRAW      Appearance CLOUDY (A) CLEAR      Specific gravity 1.027 1.003 - 1.030      pH (UA) 6.0 5.0 - 8.0      Protein TRACE (A) NEG mg/dL    Glucose NEGATIVE  NEG mg/dL    Ketone >80 (A) NEG mg/dL    Bilirubin NEGATIVE  NEG      Blood NEGATIVE  NEG      Urobilinogen 1.0 0.2 - 1.0 EU/dL    Nitrites NEGATIVE  NEG      Leukocyte Esterase SMALL (A) NEG      WBC 5-10 0 - 4 /hpf    RBC 0-5 0 - 5 /hpf    Epithelial cells MODERATE (A) FEW /lpf    Bacteria 1+ (A) NEG /hpf    Mucus 1+ (A) NEG /lpf       Assessment: 20w1d   Hyperemesis  Improving  Plan:  Continue hospitalization with hospitalized bedrest  Add protonix

## 2017-07-08 NOTE — PROGRESS NOTES
0730 Bedside report received from CELE Ramirez RN using SBAR, MAR & I&O.    0900 Pt sleeping, no distress noted. 1000 Pt con't sleeping. 1100 oob to br to void. Returned to bed. Tolerated small amount solid food and PO fluids from breakfast.    1220 Pt sleeping    1255 Pt noted returning to floor after leaving without permission. Walked pt to room and discussed importance of not leaving the unit and notifying nurse for any needs. Pt verbalizes understanding. 1930 Bedside report given to DIVYA Echevarria RN using SBAR, MAR and I&O.

## 2017-07-09 VITALS
HEART RATE: 68 BPM | OXYGEN SATURATION: 100 % | BODY MASS INDEX: 25.23 KG/M2 | RESPIRATION RATE: 18 BRPM | TEMPERATURE: 98.3 F | SYSTOLIC BLOOD PRESSURE: 120 MMHG | DIASTOLIC BLOOD PRESSURE: 74 MMHG | WEIGHT: 137.13 LBS | HEIGHT: 62 IN

## 2017-07-09 PROCEDURE — 99218 HC RM OBSERVATION: CPT

## 2017-07-09 PROCEDURE — 74011258636 HC RX REV CODE- 258/636: Performed by: SPECIALIST

## 2017-07-09 PROCEDURE — 96361 HYDRATE IV INFUSION ADD-ON: CPT

## 2017-07-09 RX ORDER — ZOLPIDEM TARTRATE 5 MG/1
10 TABLET ORAL
Status: DISCONTINUED | OUTPATIENT
Start: 2017-07-09 | End: 2017-07-09 | Stop reason: HOSPADM

## 2017-07-09 RX ORDER — ZOLPIDEM TARTRATE 10 MG/1
10 TABLET ORAL
Qty: 5 TAB | Refills: 0 | Status: SHIPPED | OUTPATIENT
Start: 2017-07-09 | End: 2017-09-23

## 2017-07-09 RX ADMIN — SODIUM CHLORIDE, SODIUM LACTATE, POTASSIUM CHLORIDE, CALCIUM CHLORIDE, AND DEXTROSE MONOHYDRATE 150 ML/HR: 600; 310; 30; 20; 5 INJECTION, SOLUTION INTRAVENOUS at 04:36

## 2017-07-09 NOTE — PROGRESS NOTES
Bedside and Verbal shift change report given to abigail lyon. Report included the following information SBAR, Kardex, Procedure Summary, Intake/Output, MAR, Recent Results and Med Rec Status.

## 2017-07-09 NOTE — DISCHARGE INSTRUCTIONS
Extreme Nausea and Vomiting in Pregnancy: Care Instructions  Your Care Instructions  Nausea and vomiting (often called morning sickness) are common in pregnancy. They are caused by pregnancy hormones and happen most often in the first 3 months. Some women get very sick and are not able to keep down food and fluids. This extreme morning sickness is called hyperemesis gravidarum. It can lead to a dangerous loss of fluids in the body. It also can keep you from gaining weight and getting proper nutrition during your pregnancy. Your body fluids are put back in balance with water and minerals called electrolytes. Medicine may help if you have severe nausea and vomiting. Follow-up care is a key part of your treatment and safety. Be sure to make and go to all appointments, and call your doctor if you are having problems. It's also a good idea to know your test results and keep a list of the medicines you take. How can you care for yourself at home? · Take your medicines exactly as prescribed. Call your doctor if you think you are having a problem with your medicine. · Drink plenty of fluids to prevent dehydration. Choose water and other caffeine-free clear liquids until you feel better. Try sipping on sports drinks that have salt and sugar in them. · Eat a small snack, such as crackers, before you get out of bed. Wait a few minutes, then get out of bed slowly. · Keep food in your stomach, but not too much at once. An empty stomach can make nausea worse. Eat several small meals every day instead of three large meals. · Eat more protein and less fat. · Get plenty of vitamin B6 by eating whole grains, nuts, seeds, and legumes. You can take vitamin B6 tablets if your doctor says it is okay. · Try to avoid smells and foods that make you feel sick to your stomach. · Get lots of rest.  · You may want to try acupressure bands.  They put pressure on an acupressure point in the wrist. Some women feel better using the bands.  · Kirsten may also help you feel better. You can use it in tea, take it as a pill, or use a kirsten syrup that you can buy at a health food store. When should you call for help? Call 911 anytime you think you may need emergency care. For example, call if:  · You passed out (lost consciousness). Call your doctor now or seek immediate medical care if:  · You vomit more than 3 times in a day, especially if you also have a fever or pain. · You are too sick to your stomach to drink any fluids. · You have signs of needing more fluids. You have sunken eyes and a dry mouth, and you pass only a little dark urine. · Your morning sickness gets worse or does not get better with home care. · You are not able to keep down your medicine. Watch closely for changes in your health, and be sure to contact your doctor if you have any problems. Where can you learn more? Go to http://kirk-izzy.info/. Enter V368 in the search box to learn more about \"Extreme Nausea and Vomiting in Pregnancy: Care Instructions. \"  Current as of: March 16, 2017  Content Version: 11.3  © 3326-0932 [employer] Care instructions adapted under license by [employer] (which disclaims liability or warranty for this information). If you have questions about a medical condition or this instruction, always ask your healthcare professional. [place] [address] any warranty or liability for your use of this information. Weeks 18 to 22 of Your Pregnancy: Care Instructions  Your Care Instructions    Your baby is continuing to develop quickly. At this stage, babies can now suck their thumbs,  firmly with their hands, and open and close their eyelids. Sometime between 18 and 22 weeks, you will start to feel your baby move. At first, these small fetal movements feel like fluttering or \"butterflies. \" Some women say that they feel like gas bubbles.  As the baby grows, these movements will become stronger. You may also notice that your baby kicks and hiccups. During this time, you may find that your nausea and fatigue are gone. Overall, you may feel better and have more energy than you did in your first trimester. But you may also have new discomforts now, such as sleep problems or leg cramps. This care sheet can help you ease these discomforts. Follow-up care is a key part of your treatment and safety. Be sure to make and go to all appointments, and call your doctor if you are having problems. It's also a good idea to know your test results and keep a list of the medicines you take. How can you care for yourself at home? Ease sleep problems  · Avoid caffeine in drinks or chocolate late in the day. · Get some exercise every day. · Take a warm shower or bath before bed. · Have a light snack or glass of milk at bedtime. · Do relaxation exercises in bed to calm your mind and body. · Support your legs and back with extra pillows. Try a pillow between your legs if you sleep on your side. · Do not use sleeping pills or alcohol. They could harm your baby. Ease leg cramps  · Do not massage your calf during the cramp. · Sit on a firm bed or chair. Straighten your leg, and bend your foot (flex your ankle) slowly upward, toward your knee. Bend your toes up and down. · Stand on a cool, flat surface. Stretch your toes upward, and take small steps walking on your heels. · Use a heating pad or hot water bottle to help with muscle ache. Prevent leg cramps  · Be sure to get enough calcium. If you are worried that you are not getting enough, talk to your doctor. · Exercise every day, and stretch your legs before bed. · Take a warm bath before bed, and try leg warmers at night. Where can you learn more? Go to http://kirk-izzy.info/. Enter K868 in the search box to learn more about \"Weeks 18 to 22 of Your Pregnancy: Care Instructions. \"  Current as of: March 16, 2017  Content Version: 11.3  © 1585-6570 [employer]. Care instructions adapted under license by [employer] (which disclaims liability or warranty for this information). If you have questions about a medical condition or this instruction, always ask your healthcare professional. [address] any warranty or liability for your use of this information.

## 2017-07-09 NOTE — PROGRESS NOTES
Ante Partum Progress Note    Rui Gilbert  20w2d    Patient states she has no new complaints    Vitals: Patient Vitals for the past 24 hrs:   BP Temp Pulse Resp SpO2   07/09/17 0720 120/74 - 68 18 100 %   07/08/17 2032 113/66 98.3 °F (36.8 °C) 77 18 -   07/08/17 1301 116/57 98.9 °F (37.2 °C) 72 20 97 %       Intake and Output:   Current shift:     Last 3 completed shifts: 07/07 1901 - 07/09 0700  In: 4817 [P.O.:1250; I.V.:3315]  Out: 1650 [Urine:1650]    Non stress test:  Reactive    Uterine Activity: None     Exam:  Patient without distress.      Abdomen, fundus soft non-tender     Extremities, no redness or tenderness               Additional Exam: Patient without distress    Labs:     Lab Results   Component Value Date/Time    WBC 10.7 07/07/2017 08:47 PM    WBC 10.7 06/28/2017 04:16 PM    WBC 8.4 01/26/2017 04:33 PM    WBC 7.1 01/04/2017 11:32 PM    WBC 6.5 12/02/2016 02:51 PM    WBC 13.3 10/19/2016 08:07 PM    WBC 12.8 12/14/2015 07:10 PM    WBC 12.9 11/03/2015 03:20 PM    WBC 10.4 08/11/2015 03:15 PM    WBC 8.6 04/29/2015 10:00 PM    WBC 8.3 04/10/2015 05:10 AM    WBC 9.9 10/08/2014 11:45 AM    WBC 15.2 11/25/2013 03:45 PM    HGB 11.6 07/07/2017 08:47 PM    HGB 12.2 06/28/2017 04:16 PM    HGB 14.0 01/26/2017 04:33 PM    HGB 13.7 01/04/2017 11:32 PM    HGB 12.8 12/02/2016 02:51 PM    HGB 15.6 10/19/2016 08:07 PM    HGB 12.1 12/14/2015 07:10 PM    HGB 13.7 11/03/2015 03:20 PM    HGB 14.5 08/11/2015 03:15 PM    HGB 12.1 04/29/2015 10:00 PM    HGB 10.4 04/10/2015 05:10 AM    HGB 13.3 10/08/2014 11:45 AM    HGB 13.7 11/25/2013 03:45 PM    HCT 33.6 07/07/2017 08:47 PM    HCT 34.3 06/28/2017 04:16 PM    HCT 41.4 01/26/2017 04:33 PM    HCT 41.0 01/04/2017 11:32 PM    HCT 38.4 12/02/2016 02:51 PM    HCT 44.1 10/19/2016 08:07 PM    HCT 35.8 12/14/2015 07:10 PM    HCT 41.5 11/03/2015 03:20 PM    HCT 42.9 08/11/2015 03:15 PM    HCT 34.7 04/29/2015 10:00 PM    HCT 31.0 04/10/2015 05:10 AM    HCT 37.9 10/08/2014 11:45 AM HCT 39.7 11/25/2013 03:45 PM    PLATELET 043 98/37/1299 08:47 PM    PLATELET 401 27/52/3210 04:16 PM    PLATELET 719 18/27/3058 04:33 PM    PLATELET 624 07/84/2656 11:32 PM    PLATELET 456 29/01/9547 02:51 PM    PLATELET 750 01/88/8353 08:07 PM    PLATELET 080 18/91/2467 07:10 PM    PLATELET 699 47/68/9131 03:20 PM    PLATELET 765 47/49/3715 03:15 PM    PLATELET 673 14/47/6166 10:00 PM    PLATELET 125 91/47/9730 05:10 AM    PLATELET 370 79/27/3013 11:45 AM    PLATELET 737 79/37/4290 03:45 PM       No results found for this or any previous visit (from the past 24 hour(s)). Assessment: 20w2d   Hyperemesis  improving    Plan:  Discharge home with the following: Activity: ad maria e Diet: as tolerated. Follow up in office: next week. Medications: prenatal vitamins.

## 2017-07-26 ENCOUNTER — HOSPITAL ENCOUNTER (OUTPATIENT)
Age: 22
Setting detail: OBSERVATION
Discharge: HOME | End: 2017-07-27
Attending: SPECIALIST | Admitting: SPECIALIST
Payer: MEDICAID

## 2017-07-26 PROBLEM — E86.0 DEHYDRATION: Status: ACTIVE | Noted: 2017-07-26

## 2017-07-26 LAB
ALBUMIN SERPL BCP-MCNC: 3.1 G/DL (ref 3.5–5)
ALBUMIN/GLOB SERPL: 0.9 {RATIO} (ref 1.1–2.2)
ALP SERPL-CCNC: 88 U/L (ref 45–117)
ALT SERPL-CCNC: 153 U/L (ref 12–78)
AMORPH CRY URNS QL MICRO: ABNORMAL
ANION GAP BLD CALC-SCNC: 7 MMOL/L (ref 5–15)
APPEARANCE UR: ABNORMAL
AST SERPL W P-5'-P-CCNC: 83 U/L (ref 15–37)
BACTERIA URNS QL MICRO: ABNORMAL /HPF
BASOPHILS # BLD AUTO: 0 K/UL (ref 0–0.1)
BASOPHILS # BLD: 0 % (ref 0–1)
BILIRUB SERPL-MCNC: 0.2 MG/DL (ref 0.2–1)
BILIRUB UR QL: NEGATIVE
BUN SERPL-MCNC: 7 MG/DL (ref 6–20)
BUN/CREAT SERPL: 16 (ref 12–20)
CALCIUM SERPL-MCNC: 8.5 MG/DL (ref 8.5–10.1)
CHLORIDE SERPL-SCNC: 106 MMOL/L (ref 97–108)
CO2 SERPL-SCNC: 23 MMOL/L (ref 21–32)
COLOR UR: ABNORMAL
CREAT SERPL-MCNC: 0.43 MG/DL (ref 0.55–1.02)
EOSINOPHIL # BLD: 0 K/UL (ref 0–0.4)
EOSINOPHIL NFR BLD: 0 % (ref 0–7)
EPITH CASTS URNS QL MICRO: ABNORMAL /LPF
ERYTHROCYTE [DISTWIDTH] IN BLOOD BY AUTOMATED COUNT: 14.1 % (ref 11.5–14.5)
GLOBULIN SER CALC-MCNC: 3.5 G/DL (ref 2–4)
GLUCOSE SERPL-MCNC: 91 MG/DL (ref 65–100)
GLUCOSE UR STRIP.AUTO-MCNC: NEGATIVE MG/DL
HCT VFR BLD AUTO: 33.8 % (ref 35–47)
HGB BLD-MCNC: 11.6 G/DL (ref 11.5–16)
HGB UR QL STRIP: NEGATIVE
KETONES UR QL STRIP.AUTO: NEGATIVE MG/DL
LEUKOCYTE ESTERASE UR QL STRIP.AUTO: ABNORMAL
LYMPHOCYTES # BLD AUTO: 14 % (ref 12–49)
LYMPHOCYTES # BLD: 1.5 K/UL (ref 0.8–3.5)
MCH RBC QN AUTO: 29.1 PG (ref 26–34)
MCHC RBC AUTO-ENTMCNC: 34.3 G/DL (ref 30–36.5)
MCV RBC AUTO: 84.9 FL (ref 80–99)
MONOCYTES # BLD: 0.4 K/UL (ref 0–1)
MONOCYTES NFR BLD AUTO: 4 % (ref 5–13)
NEUTS SEG # BLD: 8.4 K/UL (ref 1.8–8)
NEUTS SEG NFR BLD AUTO: 82 % (ref 32–75)
NITRITE UR QL STRIP.AUTO: NEGATIVE
PH UR STRIP: 7 [PH] (ref 5–8)
PLATELET # BLD AUTO: 222 K/UL (ref 150–400)
POTASSIUM SERPL-SCNC: 3.6 MMOL/L (ref 3.5–5.1)
PROT SERPL-MCNC: 6.6 G/DL (ref 6.4–8.2)
PROT UR STRIP-MCNC: NEGATIVE MG/DL
RBC # BLD AUTO: 3.98 M/UL (ref 3.8–5.2)
RBC #/AREA URNS HPF: ABNORMAL /HPF (ref 0–5)
SODIUM SERPL-SCNC: 136 MMOL/L (ref 136–145)
SP GR UR REFRACTOMETRY: 1.02 (ref 1–1.03)
UA: UC IF INDICATED,UAUC: ABNORMAL
UROBILINOGEN UR QL STRIP.AUTO: 0.2 EU/DL (ref 0.2–1)
WBC # BLD AUTO: 10.2 K/UL (ref 3.6–11)
WBC URNS QL MICRO: ABNORMAL /HPF (ref 0–4)

## 2017-07-26 PROCEDURE — 99218 HC RM OBSERVATION: CPT

## 2017-07-26 PROCEDURE — 87086 URINE CULTURE/COLONY COUNT: CPT | Performed by: SPECIALIST

## 2017-07-26 PROCEDURE — 74011250636 HC RX REV CODE- 250/636: Performed by: SPECIALIST

## 2017-07-26 PROCEDURE — 80053 COMPREHEN METABOLIC PANEL: CPT | Performed by: SPECIALIST

## 2017-07-26 PROCEDURE — 36415 COLL VENOUS BLD VENIPUNCTURE: CPT | Performed by: SPECIALIST

## 2017-07-26 PROCEDURE — 96361 HYDRATE IV INFUSION ADD-ON: CPT

## 2017-07-26 PROCEDURE — 85025 COMPLETE CBC W/AUTO DIFF WBC: CPT | Performed by: SPECIALIST

## 2017-07-26 PROCEDURE — 81001 URINALYSIS AUTO W/SCOPE: CPT | Performed by: SPECIALIST

## 2017-07-26 RX ORDER — SODIUM CHLORIDE 0.9 % (FLUSH) 0.9 %
5-10 SYRINGE (ML) INJECTION AS NEEDED
Status: DISCONTINUED | OUTPATIENT
Start: 2017-07-26 | End: 2017-07-27 | Stop reason: HOSPADM

## 2017-07-26 RX ORDER — ZOLPIDEM TARTRATE 5 MG/1
5 TABLET ORAL
Status: DISCONTINUED | OUTPATIENT
Start: 2017-07-26 | End: 2017-07-27 | Stop reason: HOSPADM

## 2017-07-26 RX ORDER — SODIUM CHLORIDE, SODIUM LACTATE, POTASSIUM CHLORIDE, CALCIUM CHLORIDE 600; 310; 30; 20 MG/100ML; MG/100ML; MG/100ML; MG/100ML
150 INJECTION, SOLUTION INTRAVENOUS CONTINUOUS
Status: DISCONTINUED | OUTPATIENT
Start: 2017-07-26 | End: 2017-07-27 | Stop reason: HOSPADM

## 2017-07-26 RX ORDER — ONDANSETRON 2 MG/ML
4 INJECTION INTRAMUSCULAR; INTRAVENOUS
Status: DISCONTINUED | OUTPATIENT
Start: 2017-07-26 | End: 2017-07-27 | Stop reason: HOSPADM

## 2017-07-26 RX ORDER — SODIUM CHLORIDE 0.9 % (FLUSH) 0.9 %
5-10 SYRINGE (ML) INJECTION EVERY 8 HOURS
Status: DISCONTINUED | OUTPATIENT
Start: 2017-07-26 | End: 2017-07-27 | Stop reason: HOSPADM

## 2017-07-26 RX ORDER — SODIUM CHLORIDE, SODIUM LACTATE, POTASSIUM CHLORIDE, CALCIUM CHLORIDE 600; 310; 30; 20 MG/100ML; MG/100ML; MG/100ML; MG/100ML
150 INJECTION, SOLUTION INTRAVENOUS CONTINUOUS
Status: DISCONTINUED | OUTPATIENT
Start: 2017-07-26 | End: 2017-07-26

## 2017-07-26 RX ADMIN — SODIUM CHLORIDE, SODIUM LACTATE, POTASSIUM CHLORIDE, AND CALCIUM CHLORIDE 150 ML/HR: 600; 310; 30; 20 INJECTION, SOLUTION INTRAVENOUS at 17:21

## 2017-07-26 RX ADMIN — SODIUM CHLORIDE, POTASSIUM CHLORIDE, SODIUM LACTATE AND CALCIUM CHLORIDE 1000 ML: 600; 310; 30; 20 INJECTION, SOLUTION INTRAVENOUS at 16:20

## 2017-07-26 NOTE — IP AVS SNAPSHOT
[address] [facility] 
[phone] Patient: Margarette Reyez MRN: [id number] [facility]:[birth year] You are allergic to the following Allergen Reactions Latex Hives Monistat 1 (Tioconazole) (Tioconazole) Swelling Sulfa (Sulfonamide Antibiotics) Hives Recent Documentation Breastfeeding? OB Status Smoking Status No Pregnant Former Smoker Unresulted Labs Order Current Status CULTURE, URINE In process Emergency Contacts  (Rel.) Home Phone Work Phone Mobile Phone [place] Wooten (Parent) [phone] -- -- About your hospitalization You were admitted on:  July 26, 2017 You last received care in the:  [address] You were discharged on:  July 27, 2017 Why you were hospitalized Your primary diagnosis was:  Not on File Your diagnoses also included:  Dehydration Providers Seen During Your Hospitalizations Provider Role Specialty Primary office phone Kalen Ross MD Attending Provider Obstetrics & Gynecology [phone] Your Primary Care Physician (PCP) Primary Care Physician Office Phone Office Fax Alexandra Lugo [phone] Follow-up Information Follow up With Details Comments Contact Info MD [place] [address] [address] [place] [address] 
[phone] Current Discharge Medication List  
  
ASK your doctor about these medications Dose & Instructions Dispensing Information Comments Morning Noon Evening Bedtime ACETAMINOPHEN EXTRA STRENGTH 500 mg tablet Generic drug:  acetaminophen Your last dose was: Your next dose is:    
   
   
 Dose:  1000 mg Take 1,000 mg by mouth every six (6) hours as needed for Pain. Refills:  0  
     
   
   
   
  
 ibuprofen 200 mg tablet Commonly known as:  MOTRIN Your last dose was: Your next dose is:    
   
   
 Dose:  800 mg Take 800 mg by mouth every six (6) hours as needed for Pain. Refills:  0  
     
   
   
   
  
 ondansetron hcl 4 mg tablet Commonly known as:  Daphnie Zamora Your last dose was: Your next dose is:    
   
   
 Dose:  4 mg Take 4 mg by mouth every eight (8) hours as needed for Nausea. Refills:  0  
     
   
   
   
  
 oxyCODONE IR 5 mg immediate release tablet Commonly known as:  Edwin Serrano Your last dose was: Your next dose is:    
   
   
 Dose:  5 mg Take 5 mg by mouth every four (4) hours as needed for Pain. Refills:  0 PHENERGAN 25 mg suppository Generic drug:  promethazine Your last dose was: Your next dose is:    
   
   
 Dose:  25 mg Insert 25 mg into rectum every six (6) hours as needed for Nausea. Refills:  0 REMERON 30 mg tablet Generic drug:  mirtazapine Your last dose was: Your next dose is: Take  by mouth nightly. Refills:  0  
     
   
   
   
  
 tiZANidine 4 mg tablet Commonly known as:  Napoleon Moya Your last dose was: Your next dose is:    
   
   
 Dose:  4 mg Take 1 Tab by mouth two (2) times a day for 30 days. Quantity:  30 Tab Refills:  0  
     
   
   
   
  
 XANAX 2 mg tablet Generic drug:  ALPRAZolam  
   
Your last dose was: Your next dose is:    
   
   
 Dose:  2 mg Take 2 mg by mouth three (3) times daily as needed for Anxiety. Refills:  0  
     
   
   
   
  
 * zolpidem 5 mg tablet Commonly known as:  AMBIEN Your last dose was: Your next dose is:    
   
   
 Dose:  5 mg Take 1 Tab by mouth nightly as needed for Sleep. Max Daily Amount: 5 mg. Quantity:  10 Tab Refills:  0  
     
   
   
   
  
 * zolpidem 10 mg tablet Commonly known as:  AMBIEN Your last dose was: Your next dose is: Dose:  10 mg Take 1 Tab by mouth nightly as needed for Sleep. Max Daily Amount: 10 mg.  
 Quantity:  5 Tab Refills:  0  
     
   
   
   
  
 * Notice: This list has 2 medication(s) that are the same as other medications prescribed for you. Read the directions carefully, and ask your doctor or other care provider to review them with you. Discharge Instructions None Discharge Orders None Introducing [facility]! Dear Gio Champion: Thank you for requesting a Eyewitness Surveillance account. Our records indicate that you already have an active Eyewitness Surveillance account. You can access your account anytime at [email or web address] Did you know that you can access your hospital and ER discharge instructions at any time in Eyewitness Surveillance? You can also review all of your test results from your hospital stay or ER visit. Additional Information If you have questions, please visit the Frequently Asked Questions section of the Eyewitness Surveillance website at [email or web address] Remember, Eyewitness Surveillance is NOT to be used for urgent needs. For medical emergencies, dial 911. Now available from your iPhone and Android! General Information Please provide this summary of care documentation to your next provider. Patient Signature:  ____________________________________________________________ Date:  ____________________________________________________________  
  
[facility] Provider Signature:  ____________________________________________________________ Date:  ____________________________________________________________

## 2017-07-26 NOTE — PROGRESS NOTES
Primary Nurse Abdirashid Martinez RN and Marky Medeiros RN performed a dual skin assessment on this patient Impairment noted- see wound doc flow sheet  Geovanni score is 23    Multiple scattered, red, itchy, raised lesions on BLE, BUE, chest, abdomen, bilat feet, back, sacrum, shoulder and groin. First lesions appeared 2 days prior to admission. Lesions accompanied with diarrhea and vomiting. Patient 22 weeks and 5 days pregnant.
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
## 2017-07-26 NOTE — PROGRESS NOTES
3:50 PM Patient arrived on unit. Dr. Char Meade notified. New Orders received for CBC, CMP, UA, 1L LR bolus, and LR infusion.

## 2017-07-26 NOTE — PROGRESS NOTES
Bedside shift change report given to Fara Ayon (oncoming nurse) by Prudence Shelton RN (offgoing nurse).  Report included the following information SBAR, Kardex, Intake/Output, MAR, Accordion and Recent Results

## 2017-07-27 VITALS
SYSTOLIC BLOOD PRESSURE: 94 MMHG | RESPIRATION RATE: 16 BRPM | TEMPERATURE: 98.4 F | OXYGEN SATURATION: 100 % | HEART RATE: 72 BPM | DIASTOLIC BLOOD PRESSURE: 39 MMHG

## 2017-07-27 PROCEDURE — 74011250636 HC RX REV CODE- 250/636: Performed by: SPECIALIST

## 2017-07-27 PROCEDURE — 96374 THER/PROPH/DIAG INJ IV PUSH: CPT

## 2017-07-27 PROCEDURE — 99218 HC RM OBSERVATION: CPT

## 2017-07-27 RX ADMIN — ONDANSETRON 4 MG: 2 INJECTION INTRAMUSCULAR; INTRAVENOUS at 08:26

## 2017-07-27 NOTE — PROGRESS NOTES
Patient was discharged home with instructions to follow up with Dr. Erin Montesinos in 1 week. There were no new prescriptions. IV was removed per hospital policy. Patient walked to front entrance by nurse manager.

## 2017-07-28 LAB
BACTERIA SPEC CULT: NORMAL
CC UR VC: NORMAL
SERVICE CMNT-IMP: NORMAL

## 2017-09-23 ENCOUNTER — HOSPITAL ENCOUNTER (EMERGENCY)
Age: 22
Discharge: OTHER INSTITUTION | End: 2017-09-23
Attending: EMERGENCY MEDICINE
Payer: MEDICAID

## 2017-09-23 VITALS
HEIGHT: 62 IN | WEIGHT: 143.96 LBS | RESPIRATION RATE: 16 BRPM | HEART RATE: 67 BPM | OXYGEN SATURATION: 98 % | TEMPERATURE: 97.8 F | BODY MASS INDEX: 26.49 KG/M2 | DIASTOLIC BLOOD PRESSURE: 57 MMHG | SYSTOLIC BLOOD PRESSURE: 104 MMHG

## 2017-09-23 DIAGNOSIS — O26.893 ABDOMINAL PAIN DURING PREGNANCY IN THIRD TRIMESTER: Primary | ICD-10-CM

## 2017-09-23 DIAGNOSIS — R10.9 ABDOMINAL PAIN DURING PREGNANCY IN THIRD TRIMESTER: Primary | ICD-10-CM

## 2017-09-23 LAB
ALBUMIN SERPL-MCNC: 2.6 G/DL (ref 3.5–5)
ALBUMIN/GLOB SERPL: 0.7 {RATIO} (ref 1.1–2.2)
ALP SERPL-CCNC: 198 U/L (ref 45–117)
ALT SERPL-CCNC: 253 U/L (ref 12–78)
ANION GAP SERPL CALC-SCNC: 11 MMOL/L (ref 5–15)
APPEARANCE UR: CLEAR
AST SERPL-CCNC: 225 U/L (ref 15–37)
BACTERIA URNS QL MICRO: ABNORMAL /HPF
BASOPHILS # BLD: 0 K/UL (ref 0–0.1)
BASOPHILS NFR BLD: 0 % (ref 0–1)
BILIRUB SERPL-MCNC: 0.5 MG/DL (ref 0.2–1)
BILIRUB UR QL CFM: NEGATIVE
BUN SERPL-MCNC: 6 MG/DL (ref 6–20)
BUN/CREAT SERPL: 11 (ref 12–20)
CALCIUM SERPL-MCNC: 8.8 MG/DL (ref 8.5–10.1)
CHLORIDE SERPL-SCNC: 103 MMOL/L (ref 97–108)
CO2 SERPL-SCNC: 24 MMOL/L (ref 21–32)
COLOR UR: ABNORMAL
CREAT SERPL-MCNC: 0.55 MG/DL (ref 0.55–1.02)
DIFFERENTIAL METHOD BLD: ABNORMAL
EOSINOPHIL # BLD: 0 K/UL (ref 0–0.4)
EOSINOPHIL NFR BLD: 0 % (ref 0–7)
EPITH CASTS URNS QL MICRO: ABNORMAL /LPF
ERYTHROCYTE [DISTWIDTH] IN BLOOD BY AUTOMATED COUNT: 12.9 % (ref 11.5–14.5)
GLOBULIN SER CALC-MCNC: 3.8 G/DL (ref 2–4)
GLUCOSE SERPL-MCNC: 113 MG/DL (ref 65–100)
GLUCOSE UR STRIP.AUTO-MCNC: NEGATIVE MG/DL
HCT VFR BLD AUTO: 34.2 % (ref 35–47)
HGB BLD-MCNC: 11.6 G/DL (ref 11.5–16)
HGB UR QL STRIP: NEGATIVE
KETONES UR QL STRIP.AUTO: ABNORMAL MG/DL
LEUKOCYTE ESTERASE UR QL STRIP.AUTO: ABNORMAL
LYMPHOCYTES # BLD: 1.8 K/UL (ref 0.8–3.5)
LYMPHOCYTES NFR BLD: 15 % (ref 12–49)
MCH RBC QN AUTO: 29.1 PG (ref 26–34)
MCHC RBC AUTO-ENTMCNC: 33.9 G/DL (ref 30–36.5)
MCV RBC AUTO: 85.7 FL (ref 80–99)
MONOCYTES # BLD: 0.3 K/UL (ref 0–1)
MONOCYTES NFR BLD: 3 % (ref 5–13)
MUCOUS THREADS URNS QL MICRO: ABNORMAL /LPF
NEUTS SEG # BLD: 9.7 K/UL (ref 1.8–8)
NEUTS SEG NFR BLD: 82 % (ref 32–75)
NITRITE UR QL STRIP.AUTO: NEGATIVE
PH UR STRIP: 6.5 [PH] (ref 5–8)
PLATELET # BLD AUTO: 186 K/UL (ref 150–400)
POTASSIUM SERPL-SCNC: 3.6 MMOL/L (ref 3.5–5.1)
PROT SERPL-MCNC: 6.4 G/DL (ref 6.4–8.2)
PROT UR STRIP-MCNC: ABNORMAL MG/DL
RBC # BLD AUTO: 3.99 M/UL (ref 3.8–5.2)
RBC #/AREA URNS HPF: ABNORMAL /HPF (ref 0–5)
SODIUM SERPL-SCNC: 138 MMOL/L (ref 136–145)
SP GR UR REFRACTOMETRY: 1.02 (ref 1–1.03)
UA: UC IF INDICATED,UAUC: ABNORMAL
UROBILINOGEN UR QL STRIP.AUTO: 1 EU/DL (ref 0.2–1)
WBC # BLD AUTO: 11.9 K/UL (ref 3.6–11)
WBC URNS QL MICRO: ABNORMAL /HPF (ref 0–4)

## 2017-09-23 PROCEDURE — 74011250636 HC RX REV CODE- 250/636: Performed by: PHYSICIAN ASSISTANT

## 2017-09-23 PROCEDURE — 96374 THER/PROPH/DIAG INJ IV PUSH: CPT

## 2017-09-23 PROCEDURE — 85025 COMPLETE CBC W/AUTO DIFF WBC: CPT | Performed by: PHYSICIAN ASSISTANT

## 2017-09-23 PROCEDURE — 36415 COLL VENOUS BLD VENIPUNCTURE: CPT | Performed by: PHYSICIAN ASSISTANT

## 2017-09-23 PROCEDURE — 99284 EMERGENCY DEPT VISIT MOD MDM: CPT

## 2017-09-23 PROCEDURE — 81001 URINALYSIS AUTO W/SCOPE: CPT | Performed by: EMERGENCY MEDICINE

## 2017-09-23 PROCEDURE — 96361 HYDRATE IV INFUSION ADD-ON: CPT

## 2017-09-23 PROCEDURE — 87086 URINE CULTURE/COLONY COUNT: CPT | Performed by: EMERGENCY MEDICINE

## 2017-09-23 PROCEDURE — 80053 COMPREHEN METABOLIC PANEL: CPT | Performed by: PHYSICIAN ASSISTANT

## 2017-09-23 RX ORDER — ONDANSETRON HYDROCHLORIDE 8 MG/1
8 TABLET, FILM COATED ORAL
Status: ON HOLD | COMMUNITY
End: 2017-10-04 | Stop reason: ALTCHOICE

## 2017-09-23 RX ORDER — PROMETHAZINE HYDROCHLORIDE 25 MG/1
25 TABLET ORAL
Status: ON HOLD | COMMUNITY
End: 2017-10-04 | Stop reason: ALTCHOICE

## 2017-09-23 RX ORDER — ONDANSETRON 2 MG/ML
4 INJECTION INTRAMUSCULAR; INTRAVENOUS
Status: COMPLETED | OUTPATIENT
Start: 2017-09-23 | End: 2017-09-23

## 2017-09-23 RX ADMIN — SODIUM CHLORIDE 1000 ML: 900 INJECTION, SOLUTION INTRAVENOUS at 14:24

## 2017-09-23 RX ADMIN — ONDANSETRON 4 MG: 2 INJECTION INTRAMUSCULAR; INTRAVENOUS at 14:24
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
## 2017-09-23 NOTE — ED NOTES
Pt left with Critical Care Transport to [facility] she continues to be stable condition

## 2017-09-23 NOTE — ED NOTES
TRANSFER - OUT REPORT:    Verbal report given to Sudha Jacques RN(name) on Margarette Reyez  being transferred to [facility] (unit) for routine progression of care       Report consisted of patients Situation, Background, Assessment and   Recommendations(SBAR). Information from the following report(s) ED Summary, MAR and Recent Results was reviewed with the receiving nurse. Lines:   Peripheral IV 09/23/17 Right Arm (Active)   Site Assessment Clean, dry, & intact 9/23/2017  2:32 PM   Phlebitis Assessment 0 9/23/2017  2:32 PM   Infiltration Assessment 0 9/23/2017  2:32 PM   Dressing Status Clean, dry, & intact 9/23/2017  2:32 PM   Dressing Type Transparent 9/23/2017  2:32 PM   Hub Color/Line Status Infusing 9/23/2017  2:32 PM        Opportunity for questions and clarification was provided.       Patient transported with:   Monitor, IV saline lock

## 2017-09-23 NOTE — ED NOTES
Pt report given to [facility] with Critical Care Transport, pt remains stable at time of transport

## 2017-09-23 NOTE — ED TRIAGE NOTES
Pt ambulates to treatment area she states that since last night she has had urinary pain, with severe burning when she urinates, urgency and frequency along with pain to lower abdomen into her groin. She is currently 31 weeks pregnant states that the baby has been moving around not as much today just seems to be turning around. Denies any fevers has been vomiting more over the past 3 days and feeling fatigued.

## 2017-09-23 NOTE — ED NOTES
Pt continues to rest with spouse at the bedside.   Informed them both that Tracey Pereira was on the phone with [facility] Hospitalist (Dr Sridevi Bush) for transfer to [facility].

## 2017-09-25 LAB
BACTERIA SPEC CULT: NORMAL
CC UR VC: NORMAL
SERVICE CMNT-IMP: NORMAL

## 2017-10-04 ENCOUNTER — HOSPITAL ENCOUNTER (EMERGENCY)
Age: 22
Discharge: HOME | End: 2017-10-04
Attending: SPECIALIST | Admitting: OBSTETRICS & GYNECOLOGY
Payer: MEDICAID

## 2017-10-04 VITALS
TEMPERATURE: 98.2 F | WEIGHT: 142 LBS | DIASTOLIC BLOOD PRESSURE: 60 MMHG | BODY MASS INDEX: 25.97 KG/M2 | RESPIRATION RATE: 18 BRPM | SYSTOLIC BLOOD PRESSURE: 105 MMHG | HEART RATE: 69 BPM | OXYGEN SATURATION: 99 %

## 2017-10-04 PROBLEM — R10.9 ABDOMINAL PAIN: Status: ACTIVE | Noted: 2017-10-04

## 2017-10-04 LAB
ALT SERPL-CCNC: 253 U/L (ref 12–78)
APPEARANCE UR: CLEAR
AST SERPL-CCNC: 186 U/L (ref 15–37)
BACTERIA URNS QL MICRO: ABNORMAL /HPF
BASOPHILS # BLD: 0 K/UL (ref 0–0.1)
BASOPHILS NFR BLD: 0 % (ref 0–1)
BILIRUB SERPL-MCNC: 0.7 MG/DL (ref 0.2–1)
BILIRUB UR QL: NEGATIVE
COLOR UR: ABNORMAL
CREAT SERPL-MCNC: 0.44 MG/DL (ref 0.55–1.02)
CREAT UR-MCNC: 52.3 MG/DL
EOSINOPHIL # BLD: 0.1 K/UL (ref 0–0.4)
EOSINOPHIL NFR BLD: 1 % (ref 0–7)
EPITH CASTS URNS QL MICRO: ABNORMAL /LPF
ERYTHROCYTE [DISTWIDTH] IN BLOOD BY AUTOMATED COUNT: 13.6 % (ref 11.5–14.5)
FIBRONECTIN FETAL VAG QL: NEGATIVE
GLUCOSE UR STRIP.AUTO-MCNC: NEGATIVE MG/DL
HCT VFR BLD AUTO: 35.1 % (ref 35–47)
HGB BLD-MCNC: 12.1 G/DL (ref 11.5–16)
HGB UR QL STRIP: NEGATIVE
KETONES UR QL STRIP.AUTO: NEGATIVE MG/DL
LDH SERPL L TO P-CCNC: 211 U/L (ref 81–246)
LEUKOCYTE ESTERASE UR QL STRIP.AUTO: NEGATIVE
LYMPHOCYTES # BLD: 2 K/UL (ref 0.8–3.5)
LYMPHOCYTES NFR BLD: 20 % (ref 12–49)
MCH RBC QN AUTO: 28.5 PG (ref 26–34)
MCHC RBC AUTO-ENTMCNC: 34.5 G/DL (ref 30–36.5)
MCV RBC AUTO: 82.6 FL (ref 80–99)
MONOCYTES # BLD: 0.4 K/UL (ref 0–1)
MONOCYTES NFR BLD: 4 % (ref 5–13)
NEUTS SEG # BLD: 7.9 K/UL (ref 1.8–8)
NEUTS SEG NFR BLD: 75 % (ref 32–75)
NITRITE UR QL STRIP.AUTO: NEGATIVE
PH UR STRIP: 7.5 [PH] (ref 5–8)
PLATELET # BLD AUTO: 178 K/UL (ref 150–400)
PROT UR STRIP-MCNC: NEGATIVE MG/DL
PROT UR-MCNC: 10 MG/DL (ref 0–11.9)
PROT/CREAT UR-RTO: 0.2
RBC # BLD AUTO: 4.25 M/UL (ref 3.8–5.2)
RBC #/AREA URNS HPF: ABNORMAL /HPF (ref 0–5)
SP GR UR REFRACTOMETRY: 1.01 (ref 1–1.03)
UA: UC IF INDICATED,UAUC: ABNORMAL
URATE SERPL-MCNC: 3.1 MG/DL (ref 2.6–6)
UROBILINOGEN UR QL STRIP.AUTO: 0.2 EU/DL (ref 0.2–1)
WBC # BLD AUTO: 10.4 K/UL (ref 3.6–11)
WBC URNS QL MICRO: ABNORMAL /HPF (ref 0–4)

## 2017-10-04 PROCEDURE — 96360 HYDRATION IV INFUSION INIT: CPT

## 2017-10-04 PROCEDURE — 82731 ASSAY OF FETAL FIBRONECTIN: CPT | Performed by: SPECIALIST

## 2017-10-04 PROCEDURE — 83615 LACTATE (LD) (LDH) ENZYME: CPT | Performed by: OBSTETRICS & GYNECOLOGY

## 2017-10-04 PROCEDURE — 84550 ASSAY OF BLOOD/URIC ACID: CPT | Performed by: OBSTETRICS & GYNECOLOGY

## 2017-10-04 PROCEDURE — 82565 ASSAY OF CREATININE: CPT | Performed by: OBSTETRICS & GYNECOLOGY

## 2017-10-04 PROCEDURE — 84460 ALANINE AMINO (ALT) (SGPT): CPT | Performed by: OBSTETRICS & GYNECOLOGY

## 2017-10-04 PROCEDURE — 85025 COMPLETE CBC W/AUTO DIFF WBC: CPT | Performed by: OBSTETRICS & GYNECOLOGY

## 2017-10-04 PROCEDURE — 81001 URINALYSIS AUTO W/SCOPE: CPT | Performed by: OBSTETRICS & GYNECOLOGY

## 2017-10-04 PROCEDURE — 87086 URINE CULTURE/COLONY COUNT: CPT | Performed by: OBSTETRICS & GYNECOLOGY

## 2017-10-04 PROCEDURE — 82247 BILIRUBIN TOTAL: CPT | Performed by: OBSTETRICS & GYNECOLOGY

## 2017-10-04 PROCEDURE — 84156 ASSAY OF PROTEIN URINE: CPT | Performed by: OBSTETRICS & GYNECOLOGY

## 2017-10-04 PROCEDURE — 84450 TRANSFERASE (AST) (SGOT): CPT | Performed by: OBSTETRICS & GYNECOLOGY

## 2017-10-04 PROCEDURE — 96361 HYDRATE IV INFUSION ADD-ON: CPT

## 2017-10-04 PROCEDURE — 99284 EMERGENCY DEPT VISIT MOD MDM: CPT

## 2017-10-04 PROCEDURE — 36415 COLL VENOUS BLD VENIPUNCTURE: CPT | Performed by: OBSTETRICS & GYNECOLOGY

## 2017-10-04 PROCEDURE — 74011250636 HC RX REV CODE- 250/636: Performed by: OBSTETRICS & GYNECOLOGY

## 2017-10-04 PROCEDURE — 59025 FETAL NON-STRESS TEST: CPT

## 2017-10-04 RX ORDER — SODIUM CHLORIDE, SODIUM LACTATE, POTASSIUM CHLORIDE, CALCIUM CHLORIDE 600; 310; 30; 20 MG/100ML; MG/100ML; MG/100ML; MG/100ML
125 INJECTION, SOLUTION INTRAVENOUS CONTINUOUS
Status: DISCONTINUED | OUTPATIENT
Start: 2017-10-04 | End: 2017-10-04 | Stop reason: HOSPADM

## 2017-10-04 RX ORDER — LANOLIN ALCOHOL/MO/W.PET/CERES
CREAM (GRAM) TOPICAL
COMMUNITY
End: 2018-01-05

## 2017-10-04 RX ORDER — DRONABINOL 2.5 MG/1
2.5 CAPSULE ORAL 2 TIMES DAILY
COMMUNITY
End: 2018-01-05

## 2017-10-04 RX ADMIN — SODIUM CHLORIDE, SODIUM LACTATE, POTASSIUM CHLORIDE, AND CALCIUM CHLORIDE 500 ML: 600; 310; 30; 20 INJECTION, SOLUTION INTRAVENOUS at 05:30

## 2017-10-04 RX ADMIN — SODIUM CHLORIDE, SODIUM LACTATE, POTASSIUM CHLORIDE, AND CALCIUM CHLORIDE 125 ML/HR: 600; 310; 30; 20 INJECTION, SOLUTION INTRAVENOUS at 06:00
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
## 2017-10-04 NOTE — IP AVS SNAPSHOT
[address] [facility] 
[phone] Patient: Laith Solares MRN: [id number] FTF:[birth year] You are allergic to the following Allergen Reactions Latex Hives Monistat 1 (Tioconazole) (Tioconazole) Swelling Sulfa (Sulfonamide Antibiotics) Hives Recent Documentation Weight BMI OB Status Smoking Status 64.4 kg 25.97 kg/m2 Pregnant Former Smoker Unresulted Labs Order Current Status CULTURE, URINE In process SAMPLE TO BLOOD BANK In process Emergency Contacts Name Discharge Info Relation Home Work Mobile Brooklyn Wooten DISCHARGE CAREGIVER [3] Mother [14] [phone] About your hospitalization You were admitted on:  N/A You last received care in the:  hospitals 3 LD TRIAGE You were discharged on:  October 4, 2017 Unit phone number:  [phone] Why you were hospitalized Your primary diagnosis was:  Not on File Your diagnoses also included:  Abdominal Pain Providers Seen During Your Hospitalizations Provider Role Specialty Primary office phone Lynne Ennis MD Attending Provider Obstetrics & Gynecology [phone] Your Primary Care Physician (PCP) Primary Care Physician Office Phone Office Fax Spencer Kimberley [phone] Follow-up Information Follow up With Details Comments Contact Info Abiodun Garza MD   [place] [address] [address] [place] [address] 
[phone] Current Discharge Medication List  
  
ASK your doctor about these medications Dose & Instructions Dispensing Information Comments Morning Noon Evening Bedtime ACETAMINOPHEN EXTRA STRENGTH 500 mg tablet Generic drug:  acetaminophen Your last dose was: Your next dose is:    
   
   
 Dose:  1000 mg Take 1,000 mg by mouth every six (6) hours as needed for Pain. Refills:  0 Iron 325 mg (65 mg iron) tablet Generic drug:  ferrous sulfate Your last dose was: Your next dose is: Take  by mouth Daily (before breakfast). Refills:  0 MARINOL 2.5 mg capsule Generic drug:  dronabinol Your last dose was: Your next dose is:    
   
   
 Dose:  2.5 mg Take 2.5 mg by mouth two (2) times a day. Refills:  0  
     
   
   
   
  
 * PHENERGAN 25 mg suppository Generic drug:  promethazine Your last dose was: Your next dose is:    
   
   
 Dose:  25 mg Insert 25 mg into rectum every six (6) hours as needed for Nausea. Refills:  0 PRENATAL DHA+COMPLETE PRENATAL -300 mg-mcg-mg Cmpk Generic drug:  [id number] jason-folic-dha Your last dose was: Your next dose is: Take  by mouth. Refills:  0  
     
   
   
   
  
 * promethazine 25 mg tablet Commonly known as:  PHENERGAN Your last dose was: Your next dose is:    
   
   
 Dose:  25 mg Take 25 mg by mouth every six (6) hours as needed for Nausea. Refills:  0 REMERON 30 mg tablet Generic drug:  mirtazapine Your last dose was: Your next dose is: Take  by mouth nightly. Refills:  0  
     
   
   
   
  
 tiZANidine 4 mg tablet Commonly known as:  Barbara Reagin Your last dose was: Your next dose is:    
   
   
 Dose:  4 mg Take 1 Tab by mouth two (2) times a day for 30 days. Quantity:  30 Tab Refills:  0  
     
   
   
   
  
 XANAX 2 mg tablet Generic drug:  ALPRAZolam  
   
Your last dose was: Your next dose is:    
   
   
 Dose:  2 mg Take 2 mg by mouth three (3) times daily as needed for Anxiety. Refills:  0 ZOFRAN (AS HYDROCHLORIDE) 8 mg tablet Generic drug:  ondansetron hcl Your last dose was:     
   
Your next dose is:    
   
   
 Dose:  8 mg Take 8 mg by mouth every eight (8) hours as needed for Nausea. Refills:  0  
     
   
   
   
  
 * Notice: This list has 2 medication(s) that are the same as other medications prescribed for you. Read the directions carefully, and ask your doctor or other care provider to review them with you. Discharge Instructions Pregnancy Precautions: Care Instructions Your Care Instructions There is no sure way to prevent labor before your due date ( labor) or to prevent most other pregnancy problems. But there are things you can do to increase your chances of a healthy pregnancy. Go to your appointments, follow your doctor's advice, and take good care of yourself. Eat well, and exercise (if your doctor agrees). And make sure to drink plenty of water. Follow-up care is a key part of your treatment and safety. Be sure to make and go to all appointments, and call your doctor if you are having problems. It's also a good idea to know your test results and keep a list of the medicines you take. How can you care for yourself at home? · Make sure you go to your prenatal appointments. At each visit, your doctor will check your blood pressure. Your doctor will also check to see if you have protein in your urine. High blood pressure and protein in urine are signs of preeclampsia. This condition can be dangerous for you and your baby. · Drink plenty of fluids, enough so that your urine is light yellow or clear like water. Dehydration can cause contractions. If you have kidney, heart, or liver disease and have to limit fluids, talk with your doctor before you increase the amount of fluids you drink. · Tell your doctor right away if you notice any symptoms of an infection, such as: ¨ Burning when you urinate. ¨ A foul-smelling discharge from your vagina. ¨ Vaginal itching. ¨ Unexplained fever. ¨ Unusual pain or soreness in your uterus or lower belly.  
· Eat a balanced diet. Include plenty of foods that are high in calcium and iron. ¨ Foods high in calcium include milk, cheese, yogurt, almonds, and broccoli. ¨ Foods high in iron include red meat, shellfish, poultry, eggs, beans, raisins, whole-grain bread, and leafy green vegetables. · Do not smoke. If you need help quitting, talk to your doctor about stop-smoking programs and medicines. These can increase your chances of quitting for good. · Do not drink alcohol or use illegal drugs. · Follow your doctor's directions about activity. Your doctor will let you know how much, if any, exercise you can do. · Ask your doctor if you can have sex. If you are at risk for early labor, your doctor may ask you to not have sex. · Take care to prevent falls. During pregnancy, your joints are loose, and your balance is off. Sports such as bicycling, skiing, or in-line skating can increase your risk of falling. And don't ride horses or motorcycles, dive, water ski, scuba dive, or parachute jump while you are pregnant. · Avoid getting very hot. Do not use saunas or hot tubs. Avoid staying out in the sun in hot weather for long periods. Take acetaminophen (Tylenol) to lower a high fever. · Do not take any over-the-counter or herbal medicines or supplements without talking to your doctor or pharmacist first. 
When should you call for help? Call 911 anytime you think you may need emergency care. For example, call if: 
· You passed out (lost consciousness). · You have severe vaginal bleeding. · You have severe pain in your belly or pelvis. · You have had fluid gushing or leaking from your vagina and you know or think the umbilical cord is bulging into your vagina. If this happens, immediately get down on your knees so your rear end (buttocks) is higher than your head. This will decrease the pressure on the cord until help arrives.  
Call your doctor now or seek immediate medical care if: 
· You have signs of preeclampsia, such as: ¨ Sudden swelling of your face, hands, or feet. ¨ New vision problems (such as dimness or blurring). ¨ A severe headache. · You have any vaginal bleeding. · You have belly pain or cramping. · You have a fever. · You have had regular contractions (with or without pain) for an hour. This means that you have 8 or more within 1 hour or 4 or more in 20 minutes after you change your position and drink fluids. · You have a sudden release of fluid from your vagina. · You have low back pain or pelvic pressure that does not go away. · You notice that your baby has stopped moving or is moving much less than normal. 
Watch closely for changes in your health, and be sure to contact your doctor if you have any problems. Where can you learn more? Go to http://kirk-izzy.info/. Enter [id number] in the search box to learn more about \"Pregnancy Precautions: Care Instructions. \" Current as of: March 16, 2017 Content Version: 11.3 © 6913-0724 [employer]. Care instructions adapted under license by [employer] (which disclaims liability or warranty for this information). If you have questions about a medical condition or this instruction, always ask your healthcare professional. [place] [address] any warranty or liability for your use of this information. Weeks 32 to 34 of Your Pregnancy: Care Instructions Your Care Instructions During the last few weeks of your pregnancy, you may have more aches and pains. It's important to rest when you can. Your growing baby is putting more pressure on your bladder. So you may need to urinate more often. Hemorrhoids are also common. These are painful, itchy veins in the rectal area. In the 36th week, most women have a test for group B streptococcus (GBS). GBS is a common bacteria that can live in the vagina and rectum. It can make your baby sick after birth.  If you test positive, you will get antibiotics during labor. These will keep your baby from getting the bacteria. You may want to talk with your doctor about banking your baby's umbilical cord blood. This is the blood left in the cord after birth. If you want to save this blood, you must arrange it ahead of time. You can't decide at the last minute. If you haven't already had the Tdap shot during this pregnancy, talk to your doctor about getting it. It will help protect your  against pertussis infection. Follow-up care is a key part of your treatment and safety. Be sure to make and go to all appointments, and call your doctor if you are having problems. It's also a good idea to know your test results and keep a list of the medicines you take. How can you care for yourself at home? Ease hemorrhoids · Get more liquids, fruits, vegetables, and fiber in your diet. This will help keep your stools soft. · Avoid sitting for too long. Lie on your left side several times a day. · Clean yourself with soft, moist toilet paper. Or you can use witch hazel pads or personal hygiene pads. · If you are uncomfortable, try ice packs. Or you can sit in a warm sitz bath. Do these for 20 minutes at a time, as needed. · Use hydrocortisone cream for pain and itching. Two examples are Anusol and Preparation H Hydrocortisone. · Ask your doctor about taking an over-the-counter stool softener. Consider breastfeeding · Experts recommend that women breastfeed for 1 year or longer. Breast milk is the perfect food for babies. · Breast milk is easier for babies to digest than formula. And it is always available, just the right temperature, and free. · In general, babies who are  are healthier than formula-fed babies. ¨  babies are less likely to get ear infections, colds, diarrhea, and pneumonia. ¨  babies who are fed only breast milk are less likely to get asthma and allergies. ¨  babies are less likely to be obese.  
¨  babies are less likely to get diabetes or heart disease. · Women who breastfeed have less bleeding after the birth. Their uteruses also shrink back faster. · Some women who breastfeed lose weight faster. Making milk burns calories. · Breastfeeding can lower your risk of breast cancer, ovarian cancer, and osteoporosis. Decide about circumcision for boys · As you make this decision, it may help to think about your personal, Confucianism, and family traditions. You get to decide if you will keep your son's penis natural or if he will be circumcised. · If you decide that you would like to have your baby circumcised, talk with your doctor. You can share your concerns about pain. And you can discuss your preferences for anesthesia. Where can you learn more? Go to http://kirk-izzy.info/. Enter N753 in the search box to learn more about \"Weeks 32 to 34 of Your Pregnancy: Care Instructions. \" Current as of: March 16, 2017 Content Version: 11.3 © 2597-7028 [employer]. Care instructions adapted under license by [employer] (which disclaims liability or warranty for this information). If you have questions about a medical condition or this instruction, always ask your healthcare professional. [place] [address] any warranty or liability for your use of this information. Discharge Orders None Introducing [facility]! Dear Mily Friends: Thank you for requesting a Change.org account. Our records indicate that you already have an active [email or web address] account. You can access your account anytime at [email or web address] Did you know that you can access your hospital and ER discharge instructions at any time in [email or web address]? You can also review all of your test results from your hospital stay or ER visit. Additional Information If you have questions, please visit the Frequently Asked Questions section of the [email or web address] website at [email or web address] Remember, MyChart is NOT to be used for urgent needs. For medical emergencies, dial 911. Now available from your iPhone and Android! General Information Please provide this summary of care documentation to your next provider. Patient Signature:  ____________________________________________________________ Date:  ____________________________________________________________  
  
[facility] Signature:  ____________________________________________________________ Date:  ____________________________________________________________

## 2017-10-04 NOTE — IP AVS SNAPSHOT
[address] [facility] 
[phone] Patient: Abner Seals MRN: [id number] [facility]:[birth year] Current Discharge Medication List  
  
ASK your doctor about these medications Dose & Instructions Dispensing Information Comments Morning Noon Evening Bedtime ACETAMINOPHEN EXTRA STRENGTH 500 mg tablet Generic drug:  acetaminophen Your last dose was: Your next dose is:    
   
   
 Dose:  1000 mg Take 1,000 mg by mouth every six (6) hours as needed for Pain. Refills:  0 Iron 325 mg (65 mg iron) tablet Generic drug:  ferrous sulfate Your last dose was: Your next dose is: Take  by mouth Daily (before breakfast). Refills:  0 MARINOL 2.5 mg capsule Generic drug:  dronabinol Your last dose was: Your next dose is:    
   
   
 Dose:  2.5 mg Take 2.5 mg by mouth two (2) times a day. Refills:  0  
     
   
   
   
  
 * PHENERGAN 25 mg suppository Generic drug:  promethazine Your last dose was: Your next dose is:    
   
   
 Dose:  25 mg Insert 25 mg into rectum every six (6) hours as needed for Nausea. Refills:  0 PRENATAL DHA+COMPLETE PRENATAL -300 mg-mcg-mg Cmpk Generic drug:  [id number] jason-folic-dha Your last dose was: Your next dose is: Take  by mouth. Refills:  0  
     
   
   
   
  
 * promethazine 25 mg tablet Commonly known as:  PHENERGAN Your last dose was: Your next dose is:    
   
   
 Dose:  25 mg Take 25 mg by mouth every six (6) hours as needed for Nausea. Refills:  0 REMERON 30 mg tablet Generic drug:  mirtazapine Your last dose was: Your next dose is: Take  by mouth nightly. Refills:  0  
     
   
   
   
  
 tiZANidine 4 mg tablet Commonly known as:  Patricia Wynn Your last dose was: Your next dose is:    
   
   
 Dose:  4 mg Take 1 Tab by mouth two (2) times a day for 30 days. Quantity:  30 Tab Refills:  0  
     
   
   
   
  
 XANAX 2 mg tablet Generic drug:  ALPRAZolam  
   
Your last dose was: Your next dose is:    
   
   
 Dose:  2 mg Take 2 mg by mouth three (3) times daily as needed for Anxiety. Refills:  0 ZOFRAN (AS HYDROCHLORIDE) 8 mg tablet Generic drug:  ondansetron hcl Your last dose was: Your next dose is:    
   
   
 Dose:  8 mg Take 8 mg by mouth every eight (8) hours as needed for Nausea. Refills:  0  
     
   
   
   
  
 * Notice: This list has 2 medication(s) that are the same as other medications prescribed for you. Read the directions carefully, and ask your doctor or other care provider to review them with you.

## 2017-10-04 NOTE — DISCHARGE INSTRUCTIONS
Pregnancy Precautions: Care Instructions  Your Care Instructions  There is no sure way to prevent labor before your due date ( labor) or to prevent most other pregnancy problems. But there are things you can do to increase your chances of a healthy pregnancy. Go to your appointments, follow your doctor's advice, and take good care of yourself. Eat well, and exercise (if your doctor agrees). And make sure to drink plenty of water. Follow-up care is a key part of your treatment and safety. Be sure to make and go to all appointments, and call your doctor if you are having problems. It's also a good idea to know your test results and keep a list of the medicines you take. How can you care for yourself at home? · Make sure you go to your prenatal appointments. At each visit, your doctor will check your blood pressure. Your doctor will also check to see if you have protein in your urine. High blood pressure and protein in urine are signs of preeclampsia. This condition can be dangerous for you and your baby. · Drink plenty of fluids, enough so that your urine is light yellow or clear like water. Dehydration can cause contractions. If you have kidney, heart, or liver disease and have to limit fluids, talk with your doctor before you increase the amount of fluids you drink. · Tell your doctor right away if you notice any symptoms of an infection, such as:  ¨ Burning when you urinate. ¨ A foul-smelling discharge from your vagina. ¨ Vaginal itching. ¨ Unexplained fever. ¨ Unusual pain or soreness in your uterus or lower belly. · Eat a balanced diet. Include plenty of foods that are high in calcium and iron. ¨ Foods high in calcium include milk, cheese, yogurt, almonds, and broccoli. ¨ Foods high in iron include red meat, shellfish, poultry, eggs, beans, raisins, whole-grain bread, and leafy green vegetables. · Do not smoke.  If you need help quitting, talk to your doctor about stop-smoking programs and medicines. These can increase your chances of quitting for good. · Do not drink alcohol or use illegal drugs. · Follow your doctor's directions about activity. Your doctor will let you know how much, if any, exercise you can do. · Ask your doctor if you can have sex. If you are at risk for early labor, your doctor may ask you to not have sex. · Take care to prevent falls. During pregnancy, your joints are loose, and your balance is off. Sports such as bicycling, skiing, or in-line skating can increase your risk of falling. And don't ride horses or motorcycles, dive, water ski, scuba dive, or parachute jump while you are pregnant. · Avoid getting very hot. Do not use saunas or hot tubs. Avoid staying out in the sun in hot weather for long periods. Take acetaminophen (Tylenol) to lower a high fever. · Do not take any over-the-counter or herbal medicines or supplements without talking to your doctor or pharmacist first.  When should you call for help? Call 911 anytime you think you may need emergency care. For example, call if:  · You passed out (lost consciousness). · You have severe vaginal bleeding. · You have severe pain in your belly or pelvis. · You have had fluid gushing or leaking from your vagina and you know or think the umbilical cord is bulging into your vagina. If this happens, immediately get down on your knees so your rear end (buttocks) is higher than your head. This will decrease the pressure on the cord until help arrives. Call your doctor now or seek immediate medical care if:  · You have signs of preeclampsia, such as:  ¨ Sudden swelling of your face, hands, or feet. ¨ New vision problems (such as dimness or blurring). ¨ A severe headache. · You have any vaginal bleeding. · You have belly pain or cramping. · You have a fever. · You have had regular contractions (with or without pain) for an hour.  This means that you have 8 or more within 1 hour or 4 or more in 20 minutes after you change your position and drink fluids. · You have a sudden release of fluid from your vagina. · You have low back pain or pelvic pressure that does not go away. · You notice that your baby has stopped moving or is moving much less than normal.  Watch closely for changes in your health, and be sure to contact your doctor if you have any problems. Where can you learn more? Go to http://kirk-izzy.info/. Enter [id number] in the search box to learn more about \"Pregnancy Precautions: Care Instructions. \"  Current as of: March 16, 2017  Content Version: 11.3  © 9301-3225 [employer]. Care instructions adapted under license by [employer] (which disclaims liability or warranty for this information). If you have questions about a medical condition or this instruction, always ask your healthcare professional. [address] any warranty or liability for your use of this information. Weeks 32 to 34 of Your Pregnancy: Care Instructions  Your Care Instructions    During the last few weeks of your pregnancy, you may have more aches and pains. It's important to rest when you can. Your growing baby is putting more pressure on your bladder. So you may need to urinate more often. Hemorrhoids are also common. These are painful, itchy veins in the rectal area. In the 36th week, most women have a test for group B streptococcus (GBS). GBS is a common bacteria that can live in the vagina and rectum. It can make your baby sick after birth. If you test positive, you will get antibiotics during labor. These will keep your baby from getting the bacteria. You may want to talk with your doctor about banking your baby's umbilical cord blood. This is the blood left in the cord after birth. If you want to save this blood, you must arrange it ahead of time. You can't decide at the last minute.   If you haven't already had the Tdap shot during this pregnancy, talk to your doctor about getting it. It will help protect your  against pertussis infection. Follow-up care is a key part of your treatment and safety. Be sure to make and go to all appointments, and call your doctor if you are having problems. It's also a good idea to know your test results and keep a list of the medicines you take. How can you care for yourself at home? Ease hemorrhoids  · Get more liquids, fruits, vegetables, and fiber in your diet. This will help keep your stools soft. · Avoid sitting for too long. Lie on your left side several times a day. · Clean yourself with soft, moist toilet paper. Or you can use witch hazel pads or personal hygiene pads. · If you are uncomfortable, try ice packs. Or you can sit in a warm sitz bath. Do these for 20 minutes at a time, as needed. · Use hydrocortisone cream for pain and itching. Two examples are Anusol and Preparation H Hydrocortisone. · Ask your doctor about taking an over-the-counter stool softener. Consider breastfeeding  · Experts recommend that women breastfeed for 1 year or longer. Breast milk is the perfect food for babies. · Breast milk is easier for babies to digest than formula. And it is always available, just the right temperature, and free. · In general, babies who are  are healthier than formula-fed babies. ¨  babies are less likely to get ear infections, colds, diarrhea, and pneumonia. ¨  babies who are fed only breast milk are less likely to get asthma and allergies. ¨  babies are less likely to be obese. ¨  babies are less likely to get diabetes or heart disease. · Women who breastfeed have less bleeding after the birth. Their uteruses also shrink back faster. · Some women who breastfeed lose weight faster. Making milk burns calories. · Breastfeeding can lower your risk of breast cancer, ovarian cancer, and osteoporosis.   Decide about circumcision for boys  · As you make this decision, it may help to think about your personal, Caodaism, and family traditions. You get to decide if you will keep your son's penis natural or if he will be circumcised. · If you decide that you would like to have your baby circumcised, talk with your doctor. You can share your concerns about pain. And you can discuss your preferences for anesthesia. Where can you learn more? Go to http://kirk-izzy.info/. Enter D578 in the search box to learn more about \"Weeks 32 to 34 of Your Pregnancy: Care Instructions. \"  Current as of: March 16, 2017  Content Version: 11.3  © 8813-3405 [employer], [place]. Care instructions adapted under license by [employer] (which disclaims liability or warranty for this information). If you have questions about a medical condition or this instruction, always ask your healthcare professional. [address] any warranty or liability for your use of this information.

## 2017-10-05 LAB
BACTERIA SPEC CULT: NORMAL
CC UR VC: NORMAL
SERVICE CMNT-IMP: NORMAL

## 2017-12-17 ENCOUNTER — HOSPITAL ENCOUNTER (EMERGENCY)
Age: 22
Discharge: HOME | End: 2017-12-17
Attending: EMERGENCY MEDICINE
Payer: MEDICAID

## 2017-12-17 ENCOUNTER — APPOINTMENT (OUTPATIENT)
Dept: CT IMAGING | Age: 22
End: 2017-12-17
Attending: PHYSICIAN ASSISTANT
Payer: MEDICAID

## 2017-12-17 VITALS
RESPIRATION RATE: 18 BRPM | HEART RATE: 89 BPM | DIASTOLIC BLOOD PRESSURE: 75 MMHG | SYSTOLIC BLOOD PRESSURE: 135 MMHG | BODY MASS INDEX: 23 KG/M2 | OXYGEN SATURATION: 98 % | TEMPERATURE: 98.2 F | WEIGHT: 125 LBS | HEIGHT: 62 IN

## 2017-12-17 DIAGNOSIS — N39.0 URINARY TRACT INFECTION WITH HEMATURIA, SITE UNSPECIFIED: Primary | ICD-10-CM

## 2017-12-17 DIAGNOSIS — R31.9 URINARY TRACT INFECTION WITH HEMATURIA, SITE UNSPECIFIED: Primary | ICD-10-CM

## 2017-12-17 LAB
ALBUMIN SERPL-MCNC: 4.2 G/DL (ref 3.5–5)
ALBUMIN/GLOB SERPL: 1.2 {RATIO} (ref 1.1–2.2)
ALP SERPL-CCNC: 102 U/L (ref 45–117)
ALT SERPL-CCNC: 23 U/L (ref 12–78)
ANION GAP SERPL CALC-SCNC: 6 MMOL/L (ref 5–15)
APPEARANCE UR: ABNORMAL
AST SERPL-CCNC: 21 U/L (ref 15–37)
BACTERIA URNS QL MICRO: NEGATIVE /HPF
BASOPHILS # BLD: 0 K/UL (ref 0–0.1)
BASOPHILS NFR BLD: 0 % (ref 0–1)
BILIRUB SERPL-MCNC: 0.3 MG/DL (ref 0.2–1)
BILIRUB UR QL: NEGATIVE
BUN SERPL-MCNC: 10 MG/DL (ref 6–20)
BUN/CREAT SERPL: 15 (ref 12–20)
CALCIUM SERPL-MCNC: 9.2 MG/DL (ref 8.5–10.1)
CHLORIDE SERPL-SCNC: 104 MMOL/L (ref 97–108)
CO2 SERPL-SCNC: 29 MMOL/L (ref 21–32)
COLOR UR: ABNORMAL
CREAT SERPL-MCNC: 0.68 MG/DL (ref 0.55–1.02)
EOSINOPHIL # BLD: 0.1 K/UL (ref 0–0.4)
EOSINOPHIL NFR BLD: 1 % (ref 0–7)
EPITH CASTS URNS QL MICRO: ABNORMAL /LPF
ERYTHROCYTE [DISTWIDTH] IN BLOOD BY AUTOMATED COUNT: 13.8 % (ref 11.5–14.5)
GLOBULIN SER CALC-MCNC: 3.6 G/DL (ref 2–4)
GLUCOSE SERPL-MCNC: 92 MG/DL (ref 65–100)
GLUCOSE UR STRIP.AUTO-MCNC: NEGATIVE MG/DL
HCG UR QL: NEGATIVE
HCT VFR BLD AUTO: 43.2 % (ref 35–47)
HGB BLD-MCNC: 14 G/DL (ref 11.5–16)
HGB UR QL STRIP: ABNORMAL
HYALINE CASTS URNS QL MICRO: ABNORMAL /LPF (ref 0–5)
KETONES UR QL STRIP.AUTO: NEGATIVE MG/DL
LEUKOCYTE ESTERASE UR QL STRIP.AUTO: ABNORMAL
LYMPHOCYTES # BLD: 2.2 K/UL (ref 0.8–3.5)
LYMPHOCYTES NFR BLD: 24 % (ref 12–49)
MCH RBC QN AUTO: 26.8 PG (ref 26–34)
MCHC RBC AUTO-ENTMCNC: 32.4 G/DL (ref 30–36.5)
MCV RBC AUTO: 82.6 FL (ref 80–99)
MONOCYTES # BLD: 0.2 K/UL (ref 0–1)
MONOCYTES NFR BLD: 3 % (ref 5–13)
NEUTS SEG # BLD: 6.8 K/UL (ref 1.8–8)
NEUTS SEG NFR BLD: 72 % (ref 32–75)
NITRITE UR QL STRIP.AUTO: NEGATIVE
PH UR STRIP: 6.5 [PH] (ref 5–8)
PLATELET # BLD AUTO: 302 K/UL (ref 150–400)
POTASSIUM SERPL-SCNC: 3.8 MMOL/L (ref 3.5–5.1)
PROT SERPL-MCNC: 7.8 G/DL (ref 6.4–8.2)
PROT UR STRIP-MCNC: NEGATIVE MG/DL
RBC # BLD AUTO: 5.23 M/UL (ref 3.8–5.2)
RBC #/AREA URNS HPF: >100 /HPF (ref 0–5)
SODIUM SERPL-SCNC: 139 MMOL/L (ref 136–145)
SP GR UR REFRACTOMETRY: 1.02 (ref 1–1.03)
UR CULT HOLD, URHOLD: NORMAL
UROBILINOGEN UR QL STRIP.AUTO: 0.2 EU/DL (ref 0.2–1)
WBC # BLD AUTO: 9.2 K/UL (ref 3.6–11)
WBC URNS QL MICRO: ABNORMAL /HPF (ref 0–4)

## 2017-12-17 PROCEDURE — 96361 HYDRATE IV INFUSION ADD-ON: CPT

## 2017-12-17 PROCEDURE — 81025 URINE PREGNANCY TEST: CPT

## 2017-12-17 PROCEDURE — 74011250637 HC RX REV CODE- 250/637: Performed by: PHYSICIAN ASSISTANT

## 2017-12-17 PROCEDURE — 80053 COMPREHEN METABOLIC PANEL: CPT | Performed by: PHYSICIAN ASSISTANT

## 2017-12-17 PROCEDURE — 96375 TX/PRO/DX INJ NEW DRUG ADDON: CPT

## 2017-12-17 PROCEDURE — 87086 URINE CULTURE/COLONY COUNT: CPT | Performed by: PHYSICIAN ASSISTANT

## 2017-12-17 PROCEDURE — 96374 THER/PROPH/DIAG INJ IV PUSH: CPT

## 2017-12-17 PROCEDURE — 74011250636 HC RX REV CODE- 250/636: Performed by: PHYSICIAN ASSISTANT

## 2017-12-17 PROCEDURE — 85025 COMPLETE CBC W/AUTO DIFF WBC: CPT | Performed by: EMERGENCY MEDICINE

## 2017-12-17 PROCEDURE — 36415 COLL VENOUS BLD VENIPUNCTURE: CPT | Performed by: PHYSICIAN ASSISTANT

## 2017-12-17 PROCEDURE — 81001 URINALYSIS AUTO W/SCOPE: CPT | Performed by: PHYSICIAN ASSISTANT

## 2017-12-17 PROCEDURE — 74011000258 HC RX REV CODE- 258: Performed by: EMERGENCY MEDICINE

## 2017-12-17 PROCEDURE — 74177 CT ABD & PELVIS W/CONTRAST: CPT

## 2017-12-17 PROCEDURE — 74011636320 HC RX REV CODE- 636/320: Performed by: EMERGENCY MEDICINE

## 2017-12-17 PROCEDURE — 99283 EMERGENCY DEPT VISIT LOW MDM: CPT

## 2017-12-17 RX ORDER — KETOROLAC TROMETHAMINE 30 MG/ML
30 INJECTION, SOLUTION INTRAMUSCULAR; INTRAVENOUS
Status: COMPLETED | OUTPATIENT
Start: 2017-12-17 | End: 2017-12-17

## 2017-12-17 RX ORDER — ONDANSETRON 4 MG/1
4 TABLET, ORALLY DISINTEGRATING ORAL
Qty: 12 TAB | Refills: 0 | Status: SHIPPED | OUTPATIENT
Start: 2017-12-17 | End: 2017-12-27

## 2017-12-17 RX ORDER — ONDANSETRON 2 MG/ML
4 INJECTION INTRAMUSCULAR; INTRAVENOUS
Status: COMPLETED | OUTPATIENT
Start: 2017-12-17 | End: 2017-12-17

## 2017-12-17 RX ORDER — CEPHALEXIN 500 MG/1
500 CAPSULE ORAL 3 TIMES DAILY
Qty: 21 CAP | Refills: 0 | Status: SHIPPED | OUTPATIENT
Start: 2017-12-17 | End: 2017-12-24

## 2017-12-17 RX ORDER — MORPHINE SULFATE 2 MG/ML
4 INJECTION, SOLUTION INTRAMUSCULAR; INTRAVENOUS
Status: COMPLETED | OUTPATIENT
Start: 2017-12-17 | End: 2017-12-17

## 2017-12-17 RX ORDER — SODIUM CHLORIDE 0.9 % (FLUSH) 0.9 %
10 SYRINGE (ML) INJECTION
Status: COMPLETED | OUTPATIENT
Start: 2017-12-17 | End: 2017-12-17

## 2017-12-17 RX ORDER — PHENAZOPYRIDINE HYDROCHLORIDE 200 MG/1
200 TABLET, FILM COATED ORAL 3 TIMES DAILY
Qty: 6 TAB | Refills: 0 | Status: SHIPPED | OUTPATIENT
Start: 2017-12-17 | End: 2017-12-19

## 2017-12-17 RX ORDER — CEPHALEXIN 500 MG/1
500 CAPSULE ORAL
Status: COMPLETED | OUTPATIENT
Start: 2017-12-17 | End: 2017-12-17

## 2017-12-17 RX ORDER — NAPROXEN 500 MG/1
500 TABLET ORAL
Qty: 20 TAB | Refills: 0 | Status: SHIPPED | OUTPATIENT
Start: 2017-12-17 | End: 2018-01-05

## 2017-12-17 RX ADMIN — MORPHINE SULFATE 4 MG: 2 INJECTION, SOLUTION INTRAMUSCULAR; INTRAVENOUS at 21:18

## 2017-12-17 RX ADMIN — CEPHALEXIN 500 MG: 500 CAPSULE ORAL at 21:54

## 2017-12-17 RX ADMIN — IOPAMIDOL 100 ML: 755 INJECTION, SOLUTION INTRAVENOUS at 20:38

## 2017-12-17 RX ADMIN — KETOROLAC TROMETHAMINE 30 MG: 30 INJECTION, SOLUTION INTRAMUSCULAR at 20:16

## 2017-12-17 RX ADMIN — Medication 10 ML: at 20:38

## 2017-12-17 RX ADMIN — SODIUM CHLORIDE 1000 ML: 900 INJECTION, SOLUTION INTRAVENOUS at 20:06

## 2017-12-17 RX ADMIN — SODIUM CHLORIDE 100 ML: 900 INJECTION, SOLUTION INTRAVENOUS at 20:38

## 2017-12-17 RX ADMIN — ONDANSETRON 4 MG: 2 INJECTION INTRAMUSCULAR; INTRAVENOUS at 20:16

## 2017-12-18 NOTE — ED NOTES
MD  reviewed discharge instructions with the patient. The patient verbalized understanding. Patient ambulated out of ED with  and baby. No complaints or concerns noted.

## 2017-12-18 NOTE — DISCHARGE INSTRUCTIONS
Urinary Tract Infection in Women: Care Instructions  Your Care Instructions    A urinary tract infection, or UTI, is a general term for an infection anywhere between the kidneys and the urethra (where urine comes out). Most UTIs are bladder infections. They often cause pain or burning when you urinate. UTIs are caused by bacteria and can be cured with antibiotics. Be sure to complete your treatment so that the infection goes away. Follow-up care is a key part of your treatment and safety. Be sure to make and go to all appointments, and call your doctor if you are having problems. It's also a good idea to know your test results and keep a list of the medicines you take. How can you care for yourself at home? · Take your antibiotics as directed. Do not stop taking them just because you feel better. You need to take the full course of antibiotics. · Drink extra water and other fluids for the next day or two. This may help wash out the bacteria that are causing the infection. (If you have kidney, heart, or liver disease and have to limit fluids, talk with your doctor before you increase your fluid intake.)  · Avoid drinks that are carbonated or have caffeine. They can irritate the bladder. · Urinate often. Try to empty your bladder each time. · To relieve pain, take a hot bath or lay a heating pad set on low over your lower belly or genital area. Never go to sleep with a heating pad in place. To prevent UTIs  · Drink plenty of water each day. This helps you urinate often, which clears bacteria from your system. (If you have kidney, heart, or liver disease and have to limit fluids, talk with your doctor before you increase your fluid intake.)  · Urinate when you need to. · Urinate right after you have sex. · Change sanitary pads often. · Avoid douches, bubble baths, feminine hygiene sprays, and other feminine hygiene products that have deodorants.   · After going to the bathroom, wipe from front to back.  When should you call for help? Call your doctor now or seek immediate medical care if:  ? · Symptoms such as fever, chills, nausea, or vomiting get worse or appear for the first time. ? · You have new pain in your back just below your rib cage. This is called flank pain. ? · There is new blood or pus in your urine. ? · You have any problems with your antibiotic medicine. ? Watch closely for changes in your health, and be sure to contact your doctor if:  ? · You are not getting better after taking an antibiotic for 2 days. ? · Your symptoms go away but then come back. Where can you learn more? Go to http://kirk-izzy.info/. Enter Q816 in the search box to learn more about \"Urinary Tract Infection in Women: Care Instructions. \"  Current as of: May 12, 2017  Content Version: 11.4  © 0966-7873 [employer]. Care instructions adapted under license by [employer] (which disclaims liability or warranty for this information). If you have questions about a medical condition or this instruction, always ask your healthcare professional. [address] any warranty or liability for your use of this information. We hope that we have addressed all of your medical concerns. The examination and treatment you received in the Emergency Department were for an emergent problem and were not intended as complete care. It is important that you follow up with your healthcare provider(s) for ongoing care. If your symptoms worsen or do not improve as expected, and you are unable to reach your usual health care provider(s), you should return to the Emergency Department. Today's healthcare is undergoing tremendous change, and patient satisfaction surveys are one of the many tools to assess the quality of medical care. You may receive a survey from the [employer] organization regarding your experience in the Emergency Department.   I hope that your experience has been completely positive, particularly the medical care that I provided. As such, please participate in the survey; anything less than excellent does not meet my expectations or intentions. [facility] and [facility] participate in nationally recognized quality of care measures. If your blood pressure is greater than 120/80, as reported below, we urge that you seek medical care to address the potential of high blood pressure, commonly known as hypertension. Hypertension can be hereditary or can be caused by certain medical conditions, pain, stress, or \"white coat syndrome. \"       Please make an appointment with your health care provider(s) for follow up of your Emergency Department visit. VITALS:   Patient Vitals for the past 8 hrs:   Temp Pulse Resp BP SpO2   12/17/17 1909 98 °F (36.7 °C) 80 16 143/79 97 %          Thank you for allowing us to provide you with medical care today. We realize that you have many choices for your emergency care needs. Please choose us in the future for any continued health care needs. Camila Zafar [facility], [address] [place] [address] 20.   Office: [phone]            Recent Results (from the past 24 hour(s))   CBC WITH AUTOMATED DIFF    Collection Time: 12/17/17  7:21 PM   Result Value Ref Range    WBC 9.2 3.6 - 11.0 K/uL    RBC 5.23 (H) 3.80 - 5.20 M/uL    HGB 14.0 11.5 - 16.0 g/dL    HCT 43.2 35.0 - 47.0 %    MCV 82.6 80.0 - 99.0 FL    MCH 26.8 26.0 - 34.0 PG    MCHC 32.4 30.0 - 36.5 g/dL    RDW 13.8 11.5 - 14.5 %    PLATELET 182 243 - 061 K/uL    NEUTROPHILS 72 32 - 75 %    LYMPHOCYTES 24 12 - 49 %    MONOCYTES 3 (L) 5 - 13 %    EOSINOPHILS 1 0 - 7 %    BASOPHILS 0 0 - 1 %    ABS. NEUTROPHILS 6.8 1.8 - 8.0 K/UL    ABS. LYMPHOCYTES 2.2 0.8 - 3.5 K/UL    ABS. MONOCYTES 0.2 0.0 - 1.0 K/UL    ABS. EOSINOPHILS 0.1 0.0 - 0.4 K/UL    ABS.  BASOPHILS 0.0 0.0 - 0.1 K/UL   METABOLIC PANEL, COMPREHENSIVE    Collection Time: 12/17/17  7:21 PM   Result Value Ref Range    Sodium 139 136 - 145 mmol/L    Potassium 3.8 3.5 - 5.1 mmol/L    Chloride 104 97 - 108 mmol/L    CO2 29 21 - 32 mmol/L    Anion gap 6 5 - 15 mmol/L    Glucose 92 65 - 100 mg/dL    BUN 10 6 - 20 MG/DL    Creatinine 0.68 0.55 - 1.02 MG/DL    BUN/Creatinine ratio 15 12 - 20      GFR est AA >60 >60 ml/min/1.73m2    GFR est non-AA >60 >60 ml/min/1.73m2    Calcium 9.2 8.5 - 10.1 MG/DL    Bilirubin, total 0.3 0.2 - 1.0 MG/DL    ALT (SGPT) 23 12 - 78 U/L    AST (SGOT) 21 15 - 37 U/L    Alk.  phosphatase 102 45 - 117 U/L    Protein, total 7.8 6.4 - 8.2 g/dL    Albumin 4.2 3.5 - 5.0 g/dL    Globulin 3.6 2.0 - 4.0 g/dL    A-G Ratio 1.2 1.1 - 2.2     URINALYSIS W/MICROSCOPIC    Collection Time: 12/17/17  7:21 PM   Result Value Ref Range    Color YELLOW/STRAW      Appearance CLOUDY (A) CLEAR      Specific gravity 1.020 1.003 - 1.030      pH (UA) 6.5 5.0 - 8.0      Protein NEGATIVE  NEG mg/dL    Glucose NEGATIVE  NEG mg/dL    Ketone NEGATIVE  NEG mg/dL    Bilirubin NEGATIVE  NEG      Blood LARGE (A) NEG      Urobilinogen 0.2 0.2 - 1.0 EU/dL    Nitrites NEGATIVE  NEG      Leukocyte Esterase SMALL (A) NEG      WBC 5-10 0 - 4 /hpf    RBC >100 (H) 0 - 5 /hpf    Epithelial cells MODERATE (A) FEW /lpf    Bacteria NEGATIVE  NEG /hpf    Hyaline cast 0-2 0 - 5 /lpf   URINE CULTURE HOLD SAMPLE    Collection Time: 12/17/17  7:21 PM   Result Value Ref Range    Urine culture hold URINE ON HOLD IN MICROBIOLOGY DEPT FOR 3 DAYS     HCG URINE, QL. - POC    Collection Time: 12/17/17  8:19 PM   Result Value Ref Range    Pregnancy test,urine (POC) NEGATIVE  NEG         Ct Abd Pelv W Cont    Result Date: 12/17/2017  INDICATION: pain, right flank and lower abdomen, radiating into both hips and thighs, for 3 days COMPARISON: January 26, 2017 TECHNIQUE: Following the uneventful intravenous administration of 100 cc Isovue-370, thin axial images were obtained through the abdomen and pelvis. Coronal and sagittal reconstructions were generated. Oral contrast was not administered. CT dose reduction was achieved through use of a standardized protocol tailored for this examination and automatic exposure control for dose modulation. Adaptive statistical iterative reconstruction (ASIR) was utilized. FINDINGS: LUNG BASES: No abnormality. LIVER: No mass or biliary dilatation. GALLBLADDER: Unremarkable. SPLEEN: No enlargement or lesion. PANCREAS: No mass or ductal dilatation. ADRENALS: No mass. KIDNEYS: No mass, calculus, or hydronephrosis. GI TRACT:  No bowel obstruction or wall thickening allowing for lack of oral contrast PERITONEUM: No free air or free fluid. APPENDIX: Unremarkable. RETROPERITONEUM: No lymphadenopathy or aortic aneurysm. ADDITIONAL COMMENTS: N/A. URINARY BLADDER: Unremarkable. REPRODUCTIVE ORGANS: Unremarkable. LYMPH NODES:  None enlarged. FREE FLUID:  None. BONES: No destructive bone lesion. ADDITIONAL COMMENTS: N/A. IMPRESSION: No acute process. Normal appendix.

## 2017-12-18 NOTE — ED TRIAGE NOTES
Pt states that she has had burning with urination for the past 3 days and since last night she has had more intense pain to her right flank with radiation into her right lower abd. . Pt had a  2017. Pt states that the pain also radiates into her hips and down into both thighs.

## 2017-12-19 LAB
BACTERIA SPEC CULT: ABNORMAL
CC UR VC: ABNORMAL
SERVICE CMNT-IMP: ABNORMAL

## 2018-01-01 ENCOUNTER — ED HISTORICAL/CONVERTED ENCOUNTER (OUTPATIENT)
Dept: OTHER | Age: 23
End: 2018-01-01

## 2018-01-05 ENCOUNTER — OFFICE VISIT (OUTPATIENT)
Dept: FAMILY MEDICINE CLINIC | Age: 23
End: 2018-01-05

## 2018-01-05 VITALS
OXYGEN SATURATION: 99 % | RESPIRATION RATE: 18 BRPM | HEART RATE: 71 BPM | WEIGHT: 122 LBS | TEMPERATURE: 97.3 F | HEIGHT: 62 IN | DIASTOLIC BLOOD PRESSURE: 82 MMHG | SYSTOLIC BLOOD PRESSURE: 122 MMHG | BODY MASS INDEX: 22.45 KG/M2

## 2018-01-05 DIAGNOSIS — M25.552 ACUTE HIP PAIN, LEFT: ICD-10-CM

## 2018-01-05 DIAGNOSIS — T07.XXXA ABRASION, MULTIPLE SITES: Primary | ICD-10-CM

## 2018-01-05 RX ORDER — HYDROCODONE BITARTRATE AND ACETAMINOPHEN 5; 325 MG/1; MG/1
1 TABLET ORAL
Qty: 20 TAB | Refills: 0 | Status: ON HOLD | OUTPATIENT
Start: 2018-01-05 | End: 2018-10-23
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
## 2018-01-05 NOTE — PATIENT INSTRUCTIONS
Scrapes (Abrasions): Care Instructions  Your Care Instructions  Scrapes (abrasions) are wounds where your skin has been rubbed or torn off. Most scrapes do not go deep into the skin, but some may remove several layers of skin. Scrapes usually don't bleed much, but they may ooze pinkish fluid. Scrapes on the head or face may appear worse than they are. They may bleed a lot because of the good blood supply to this area. Most scrapes heal well and may not need a bandage. They usually heal within 3 to 7 days. A large, deep scrape may take 1 to 2 weeks or longer to heal. A scab may form on some scrapes. Follow-up care is a key part of your treatment and safety. Be sure to make and go to all appointments, and call your doctor if you are having problems. It's also a good idea to know your test results and keep a list of the medicines you take. How can you care for yourself at home? · If your doctor told you how to care for your wound, follow your doctor's instructions. If you did not get instructions, follow this general advice:  ¨ Wash the scrape with clean water 2 times a day. Don't use hydrogen peroxide or alcohol, which can slow healing. ¨ You may cover the scrape with a thin layer of petroleum jelly, such as Vaseline, and a nonstick bandage. ¨ Apply more petroleum jelly and replace the bandage as needed. · Prop up the injured area on a pillow anytime you sit or lie down during the next 3 days. Try to keep it above the level of your heart. This will help reduce swelling. · Be safe with medicines. Take pain medicines exactly as directed. ¨ If the doctor gave you a prescription medicine for pain, take it as prescribed. ¨ If you are not taking a prescription pain medicine, ask your doctor if you can take an over-the-counter medicine. When should you call for help?   Call your doctor now or seek immediate medical care if:  ? · You have signs of infection, such as:  ¨ Increased pain, swelling, warmth, or redness around the scrape. ¨ Red streaks leading from the scrape. ¨ Pus draining from the scrape. ¨ A fever. ? · The scrape starts to bleed, and blood soaks through the bandage. Oozing small amounts of blood is normal.   ? Watch closely for changes in your health, and be sure to contact your doctor if the scrape is not getting better each day. Where can you learn more? Go to http://kirk-izzy.info/. Enter A374 in the search box to learn more about \"Scrapes (Abrasions): Care Instructions. \"  Current as of: March 20, 2017  Content Version: 11.4  © 2412-6605 [employer]. Care instructions adapted under license by [employer] (which disclaims liability or warranty for this information). If you have questions about a medical condition or this instruction, always ask your healthcare professional. [place] [address] any warranty or liability for your use of this information.

## 2018-01-05 NOTE — MR AVS SNAPSHOT
Visit Information Date & Time Provider Department Dept. Phone Encounter #  
 1/5/2018 10:45 AM Kaity WickRyan 108 [phone] [id number] Follow-up Instructions Return if symptoms worsen or fail to improve. Upcoming Health Maintenance Date Due  
 HPV AGE 9Y-34Y (1 of 3 - Female 3 Dose Series) 2/4/2005 DTaP/Tdap/Td series (1 - Tdap) 2/4/2015 PAP AKA CERVICAL CYTOLOGY 2/4/2015 Influenza Age 5 to Adult 8/1/2017 Allergies as of 1/5/2018  Review Complete On: 1/5/2018 By: Kaity Wick MD  
  
 Severity Noted Reaction Type Reactions Latex  04/29/2015    Hives Hydrocodone  01/05/2018    Itching Monistat 1 (Tioconazole) [Tioconazole]  08/11/2015    Swelling Sulfa (Sulfonamide Antibiotics)  07/19/2012    Hives Current Immunizations  Never Reviewed Name Date Rho(D) Immune Globulin - IM 1/26/2017  6:58 PM, 9/11/2015  4:50 PM  
  
 Not reviewed this visit You Were Diagnosed With   
  
 Codes Comments Abrasion, multiple sites    -  Primary ICD-10-CM: T07. [place] ICD-9-CM: 919.0 Acute hip pain, left     ICD-10-CM: M25.552 ICD-9-CM: 719.45 Vitals BP Pulse Temp Resp Height(growth percentile) Weight(growth percentile) 122/82 (BP 1 Location: Right arm, BP Patient Position: Sitting) 71 97.3 °F (36.3 °C) (Oral) 18 5' 2\" (1.575 m) 122 lb (55.3 kg) LMP SpO2 Breastfeeding? BMI OB Status Smoking Status 12/22/2016 99% Unknown 22.31 kg/m2 Recent pregnancy Former Smoker BMI and BSA Data Body Mass Index Body Surface Area  
 22.31 kg/m 2 1.56 m 2 Preferred Pharmacy Pharmacy Name Phone [id number] [address] , [id number] [place] [address]  [phone] Your Updated Medication List  
  
   
This list is accurate as of: 1/5/18 12:04 PM.  Always use your most recent med list.  
  
  
  
  
 ACETAMINOPHEN EXTRA STRENGTH 500 mg tablet Generic drug:  acetaminophen Take 1,000 mg by mouth every six (6) hours as needed for Pain. HYDROcodone-acetaminophen 5-325 mg per tablet Commonly known as:  Elva Records Take 1 Tab by mouth every eight (8) hours as needed for Pain. Max Daily Amount: 3 Tabs. tiZANidine 4 mg tablet Commonly known as:  Tresia Mita Take 1 Tab by mouth two (2) times a day for 30 days. XANAX 2 mg tablet Generic drug:  ALPRAZolam  
Take 2 mg by mouth three (3) times daily as needed for Anxiety. Prescriptions Printed Refills HYDROcodone-acetaminophen (NORCO) 5-325 mg per tablet 0 Sig: Take 1 Tab by mouth every eight (8) hours as needed for Pain. Max Daily Amount: 3 Tabs. Class: Print Route: Oral  
  
Follow-up Instructions Return if symptoms worsen or fail to improve. Patient Instructions Scrapes (Abrasions): Care Instructions Your Care Instructions Scrapes (abrasions) are wounds where your skin has been rubbed or torn off. Most scrapes do not go deep into the skin, but some may remove several layers of skin. Scrapes usually don't bleed much, but they may ooze pinkish fluid. Scrapes on the head or face may appear worse than they are. They may bleed a lot because of the good blood supply to this area. Most scrapes heal well and may not need a bandage. They usually heal within 3 to 7 days. A large, deep scrape may take 1 to 2 weeks or longer to heal. A scab may form on some scrapes. Follow-up care is a key part of your treatment and safety. Be sure to make and go to all appointments, and call your doctor if you are having problems. It's also a good idea to know your test results and keep a list of the medicines you take. How can you care for yourself at home? · If your doctor told you how to care for your wound, follow your doctor's instructions. If you did not get instructions, follow this general advice: ¨ Wash the scrape with clean water 2 times a day.  Don't use hydrogen peroxide or alcohol, which can slow healing. ¨ You may cover the scrape with a thin layer of petroleum jelly, such as Vaseline, and a nonstick bandage. ¨ Apply more petroleum jelly and replace the bandage as needed. · Prop up the injured area on a pillow anytime you sit or lie down during the next 3 days. Try to keep it above the level of your heart. This will help reduce swelling. · Be safe with medicines. Take pain medicines exactly as directed. ¨ If the doctor gave you a prescription medicine for pain, take it as prescribed. ¨ If you are not taking a prescription pain medicine, ask your doctor if you can take an over-the-counter medicine. When should you call for help? Call your doctor now or seek immediate medical care if: 
? · You have signs of infection, such as: 
¨ Increased pain, swelling, warmth, or redness around the scrape. ¨ Red streaks leading from the scrape. ¨ Pus draining from the scrape. ¨ A fever. ? · The scrape starts to bleed, and blood soaks through the bandage. Oozing small amounts of blood is normal. ? Watch closely for changes in your health, and be sure to contact your doctor if the scrape is not getting better each day. Where can you learn more? Go to http://kirk-izzy.info/. Enter A374 in the search box to learn more about \"Scrapes (Abrasions): Care Instructions. \" Current as of: March 20, 2017 Content Version: 11.4 © 0156-6899 [employer]. Care instructions adapted under license by [employer] (which disclaims liability or warranty for this information). If you have questions about a medical condition or this instruction, always ask your healthcare professional. [place] [address] any warranty or liability for your use of this information. Introducing [facility]! Dear Kvng Mays: Thank you for requesting a coUrbanize account. Our records indicate that you already have an active coUrbanize account.   You can access your account anytime at [email or web address] Did you know that you can access your hospital and ER discharge instructions at any time in [email or web address]? You can also review all of your test results from your hospital stay or ER visit. Additional Information If you have questions, please visit the Frequently Asked Questions section of the [email or web address] website at [email or web address] Remember, [email or web address] is NOT to be used for urgent needs. For medical emergencies, dial 911. Now available from your iPhone and Android! Please provide this summary of care documentation to your next provider. Your primary care clinician is listed as [address]. If you have any questions after today's visit, please call [phone].

## 2018-10-22 ENCOUNTER — HOSPITAL ENCOUNTER (INPATIENT)
Age: 23
LOS: 11 days | Discharge: HOME | DRG: 753 | End: 2018-11-02
Attending: EMERGENCY MEDICINE | Admitting: PSYCHIATRY & NEUROLOGY
Payer: MEDICAID

## 2018-10-22 DIAGNOSIS — F20.9 SCHIZOPHRENIA, UNSPECIFIED TYPE (HCC): Primary | ICD-10-CM

## 2018-10-22 LAB
ALBUMIN SERPL-MCNC: 4.1 G/DL (ref 3.5–5)
ALBUMIN/GLOB SERPL: 1.3 {RATIO} (ref 1.1–2.2)
ALP SERPL-CCNC: 76 U/L (ref 45–117)
ALT SERPL-CCNC: 17 U/L (ref 12–78)
AMPHET UR QL SCN: NEGATIVE
ANION GAP SERPL CALC-SCNC: 8 MMOL/L (ref 5–15)
APPEARANCE UR: ABNORMAL
AST SERPL-CCNC: 14 U/L (ref 15–37)
BACTERIA URNS QL MICRO: NEGATIVE /HPF
BARBITURATES UR QL SCN: NEGATIVE
BASOPHILS # BLD: 0 K/UL (ref 0–0.1)
BASOPHILS NFR BLD: 0 % (ref 0–1)
BENZODIAZ UR QL: NEGATIVE
BILIRUB SERPL-MCNC: 0.3 MG/DL (ref 0.2–1)
BILIRUB UR QL: NEGATIVE
BUN SERPL-MCNC: 9 MG/DL (ref 6–20)
BUN/CREAT SERPL: 14 (ref 12–20)
CALCIUM SERPL-MCNC: 8.9 MG/DL (ref 8.5–10.1)
CANNABINOIDS UR QL SCN: POSITIVE
CHLORIDE SERPL-SCNC: 106 MMOL/L (ref 97–108)
CO2 SERPL-SCNC: 28 MMOL/L (ref 21–32)
COCAINE UR QL SCN: NEGATIVE
COLOR UR: ABNORMAL
CREAT SERPL-MCNC: 0.66 MG/DL (ref 0.55–1.02)
DIFFERENTIAL METHOD BLD: ABNORMAL
DRUG SCRN COMMENT,DRGCM: ABNORMAL
EOSINOPHIL # BLD: 0 K/UL (ref 0–0.4)
EOSINOPHIL NFR BLD: 0 % (ref 0–7)
EPITH CASTS URNS QL MICRO: ABNORMAL /LPF
ERYTHROCYTE [DISTWIDTH] IN BLOOD BY AUTOMATED COUNT: 14.2 % (ref 11.5–14.5)
ETHANOL SERPL-MCNC: <10 MG/DL
GLOBULIN SER CALC-MCNC: 3.2 G/DL (ref 2–4)
GLUCOSE SERPL-MCNC: 99 MG/DL (ref 65–100)
GLUCOSE UR STRIP.AUTO-MCNC: NEGATIVE MG/DL
HCG UR QL: NEGATIVE
HCT VFR BLD AUTO: 39 % (ref 35–47)
HGB BLD-MCNC: 13 G/DL (ref 11.5–16)
HGB UR QL STRIP: ABNORMAL
IMM GRANULOCYTES # BLD: 0 K/UL (ref 0–0.04)
IMM GRANULOCYTES NFR BLD AUTO: 0 % (ref 0–0.5)
KETONES UR QL STRIP.AUTO: NEGATIVE MG/DL
LEUKOCYTE ESTERASE UR QL STRIP.AUTO: NEGATIVE
LYMPHOCYTES # BLD: 1.4 K/UL (ref 0.8–3.5)
LYMPHOCYTES NFR BLD: 13 % (ref 12–49)
MCH RBC QN AUTO: 27.8 PG (ref 26–34)
MCHC RBC AUTO-ENTMCNC: 33.3 G/DL (ref 30–36.5)
MCV RBC AUTO: 83.5 FL (ref 80–99)
METHADONE UR QL: NEGATIVE
MONOCYTES # BLD: 0.4 K/UL (ref 0–1)
MONOCYTES NFR BLD: 4 % (ref 5–13)
MUCOUS THREADS URNS QL MICRO: ABNORMAL /LPF
NEUTS SEG # BLD: 9.2 K/UL (ref 1.8–8)
NEUTS SEG NFR BLD: 83 % (ref 32–75)
NITRITE UR QL STRIP.AUTO: NEGATIVE
NRBC # BLD: 0 K/UL (ref 0–0.01)
NRBC BLD-RTO: 0 PER 100 WBC
OPIATES UR QL: NEGATIVE
PCP UR QL: NEGATIVE
PH UR STRIP: 6 [PH] (ref 5–8)
PLATELET # BLD AUTO: 320 K/UL (ref 150–400)
PMV BLD AUTO: 10.3 FL (ref 8.9–12.9)
POTASSIUM SERPL-SCNC: 3.4 MMOL/L (ref 3.5–5.1)
PROT SERPL-MCNC: 7.3 G/DL (ref 6.4–8.2)
PROT UR STRIP-MCNC: NEGATIVE MG/DL
RBC # BLD AUTO: 4.67 M/UL (ref 3.8–5.2)
RBC #/AREA URNS HPF: ABNORMAL /HPF (ref 0–5)
SODIUM SERPL-SCNC: 142 MMOL/L (ref 136–145)
SP GR UR REFRACTOMETRY: 1.02 (ref 1–1.03)
UA: UC IF INDICATED,UAUC: ABNORMAL
UROBILINOGEN UR QL STRIP.AUTO: 0.2 EU/DL (ref 0.2–1)
WBC # BLD AUTO: 11.1 K/UL (ref 3.6–11)
WBC URNS QL MICRO: ABNORMAL /HPF (ref 0–4)

## 2018-10-22 PROCEDURE — 99285 EMERGENCY DEPT VISIT HI MDM: CPT

## 2018-10-22 PROCEDURE — 85025 COMPLETE CBC W/AUTO DIFF WBC: CPT | Performed by: PHYSICIAN ASSISTANT

## 2018-10-22 PROCEDURE — 36415 COLL VENOUS BLD VENIPUNCTURE: CPT | Performed by: PHYSICIAN ASSISTANT

## 2018-10-22 PROCEDURE — 80307 DRUG TEST PRSMV CHEM ANLYZR: CPT | Performed by: PHYSICIAN ASSISTANT

## 2018-10-22 PROCEDURE — 80053 COMPREHEN METABOLIC PANEL: CPT | Performed by: PHYSICIAN ASSISTANT

## 2018-10-22 PROCEDURE — 81025 URINE PREGNANCY TEST: CPT

## 2018-10-22 PROCEDURE — 65220000003 HC RM SEMIPRIVATE PSYCH

## 2018-10-22 PROCEDURE — 74011250637 HC RX REV CODE- 250/637: Performed by: PHYSICIAN ASSISTANT

## 2018-10-22 PROCEDURE — 81001 URINALYSIS AUTO W/SCOPE: CPT | Performed by: PHYSICIAN ASSISTANT

## 2018-10-22 RX ORDER — IBUPROFEN 400 MG/1
400 TABLET ORAL
Status: DISCONTINUED | OUTPATIENT
Start: 2018-10-22 | End: 2018-11-02 | Stop reason: HOSPADM

## 2018-10-22 RX ORDER — POTASSIUM CHLORIDE 750 MG/1
40 TABLET, FILM COATED, EXTENDED RELEASE ORAL
Status: COMPLETED | OUTPATIENT
Start: 2018-10-22 | End: 2018-10-22

## 2018-10-22 RX ORDER — ZOLPIDEM TARTRATE 10 MG/1
10 TABLET ORAL
Status: DISCONTINUED | OUTPATIENT
Start: 2018-10-22 | End: 2018-10-23

## 2018-10-22 RX ORDER — ADHESIVE BANDAGE
30 BANDAGE TOPICAL DAILY PRN
Status: DISCONTINUED | OUTPATIENT
Start: 2018-10-22 | End: 2018-11-02 | Stop reason: HOSPADM

## 2018-10-22 RX ORDER — LORAZEPAM 2 MG/ML
2 INJECTION INTRAMUSCULAR
Status: DISCONTINUED | OUTPATIENT
Start: 2018-10-22 | End: 2018-11-02 | Stop reason: HOSPADM

## 2018-10-22 RX ORDER — IBUPROFEN 200 MG
1 TABLET ORAL
Status: DISCONTINUED | OUTPATIENT
Start: 2018-10-22 | End: 2018-11-02 | Stop reason: HOSPADM

## 2018-10-22 RX ORDER — BENZTROPINE MESYLATE 1 MG/ML
2 INJECTION INTRAMUSCULAR; INTRAVENOUS
Status: DISCONTINUED | OUTPATIENT
Start: 2018-10-22 | End: 2018-11-02 | Stop reason: HOSPADM

## 2018-10-22 RX ORDER — BENZTROPINE MESYLATE 2 MG/1
2 TABLET ORAL
Status: DISCONTINUED | OUTPATIENT
Start: 2018-10-22 | End: 2018-11-02 | Stop reason: HOSPADM

## 2018-10-22 RX ORDER — LORAZEPAM 1 MG/1
1 TABLET ORAL
Status: DISCONTINUED | OUTPATIENT
Start: 2018-10-22 | End: 2018-10-23

## 2018-10-22 RX ORDER — ACETAMINOPHEN 325 MG/1
650 TABLET ORAL
Status: DISCONTINUED | OUTPATIENT
Start: 2018-10-22 | End: 2018-11-02 | Stop reason: HOSPADM

## 2018-10-22 RX ORDER — OLANZAPINE 5 MG/1
5 TABLET ORAL
Status: DISCONTINUED | OUTPATIENT
Start: 2018-10-22 | End: 2018-11-02 | Stop reason: HOSPADM

## 2018-10-22 RX ADMIN — POTASSIUM CHLORIDE 40 MEQ: 750 TABLET, FILM COATED, EXTENDED RELEASE ORAL at 16:00
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
## 2018-10-22 NOTE — ED NOTES
Emergency Department Nursing Plan of Care The Nursing Plan of Care is developed from the Nursing assessment and Emergency Department Attending provider initial evaluation. The plan of care may be reviewed in the ED Provider note. The Plan of Care was developed with the following considerations:  
Patient / Family readiness to learn indicated by:verbalized understanding Persons(s) to be included in education: patient Barriers to Learning/Limitations:No 
 
Signed No Greer RN   
10/22/2018   3:22 PM

## 2018-10-22 NOTE — ED NOTES
TRANSFER - OUT REPORT: 
 
Verbal report given to Galina Torres RN (name) on Verle Frame  being transferred to Acute U(unit) for routine progression of care Report consisted of patients Situation, Background, Assessment and  
Recommendations(SBAR). Information from the following report(s) SBAR was reviewed with the receiving nurse. Lines:    
 
Opportunity for questions and clarification was provided. Patient transported with: 
 Police and Security

## 2018-10-22 NOTE — ED TRIAGE NOTES
Patient is an ECO from [email or web address].  She was brought in by police. She called the police on herself stating that she was not able to take care of her children ages 3 and 3 because she feels she has mental health issues and she feared for their safety. She does have a hx of mental illness and has not taken her home medications in 2 months
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
## 2018-10-23 PROBLEM — F60.3 BORDERLINE PERSONALITY DISORDER IN ADULT (HCC): Status: ACTIVE | Noted: 2018-10-23

## 2018-10-23 PROBLEM — F12.10 CANNABIS USE DISORDER, MILD, ABUSE: Status: ACTIVE | Noted: 2018-10-23

## 2018-10-23 PROBLEM — F20.9 SCHIZOPHRENIA (HCC): Status: RESOLVED | Noted: 2018-10-22 | Resolved: 2018-10-23

## 2018-10-23 PROCEDURE — 65220000003 HC RM SEMIPRIVATE PSYCH

## 2018-10-23 PROCEDURE — 74011250637 HC RX REV CODE- 250/637: Performed by: PSYCHIATRY & NEUROLOGY

## 2018-10-23 RX ORDER — LAMOTRIGINE 25 MG/1
25 TABLET ORAL DAILY
Status: DISCONTINUED | OUTPATIENT
Start: 2018-10-23 | End: 2018-10-24

## 2018-10-23 RX ORDER — TRAZODONE HYDROCHLORIDE 50 MG/1
50 TABLET ORAL
Status: DISCONTINUED | OUTPATIENT
Start: 2018-10-23 | End: 2018-11-02 | Stop reason: HOSPADM

## 2018-10-23 RX ORDER — DULOXETIN HYDROCHLORIDE 20 MG/1
20 CAPSULE, DELAYED RELEASE ORAL DAILY
Status: DISCONTINUED | OUTPATIENT
Start: 2018-10-23 | End: 2018-10-28

## 2018-10-23 RX ORDER — VALACYCLOVIR HYDROCHLORIDE 500 MG/1
500 TABLET, FILM COATED ORAL DAILY
Status: DISCONTINUED | OUTPATIENT
Start: 2018-10-24 | End: 2018-11-02 | Stop reason: HOSPADM

## 2018-10-23 RX ORDER — VALACYCLOVIR HYDROCHLORIDE 500 MG/1
500 TABLET, FILM COATED ORAL DAILY
COMMUNITY
End: 2019-09-30

## 2018-10-23 RX ORDER — IBUPROFEN 600 MG/1
600 TABLET ORAL
COMMUNITY
End: 2018-11-02

## 2018-10-23 RX ORDER — OXYCODONE AND ACETAMINOPHEN 5; 325 MG/1; MG/1
1 TABLET ORAL
Status: ON HOLD | COMMUNITY
End: 2018-10-23

## 2018-10-23 RX ORDER — ZOLPIDEM TARTRATE 10 MG/1
10 TABLET ORAL
Status: ON HOLD | COMMUNITY
End: 2018-10-23

## 2018-10-23 RX ADMIN — LAMOTRIGINE 25 MG: 25 TABLET ORAL at 12:10

## 2018-10-23 RX ADMIN — DULOXETINE HYDROCHLORIDE 20 MG: 20 CAPSULE, DELAYED RELEASE ORAL at 12:10

## 2018-10-23 RX ADMIN — OLANZAPINE 5 MG: 5 TABLET, FILM COATED ORAL at 00:01

## 2018-10-23 RX ADMIN — TRAZODONE HYDROCHLORIDE 50 MG: 50 TABLET ORAL at 21:38

## 2018-10-23 RX ADMIN — LORAZEPAM 1 MG: 1 TABLET ORAL at 00:00

## 2018-10-23 NOTE — PROGRESS NOTES
Problem: Depressed Mood (Adult/Pediatric) Goal: *STG: Remains safe in hospital 
Outcome: Progressing Towards Goal 
Patient slept 5 hours this shift. No signs/symptoms of distress. Staff will continue to monitor q15 minutes for safety.

## 2018-10-23 NOTE — PROGRESS NOTES
Problem: Depressed Mood (Adult/Pediatric) Goal: *STG: Complies with medication therapy Outcome: Progressing Towards Goal 
Pt has been med and diet compliant. Denies SI, HI, hallucinations, and delusions. Denied pain and discomfort. Pt affect is labile and pt frequently tearful.

## 2018-10-23 NOTE — PROGRESS NOTES
[facility] Medication Reconciliation Recommendations/Findings:  
1) Patient reports weaning herself off alprazolam and reports zolpidem makes her \"trip. \"  Removed from her PTA medication list.  
 
The following changes were made to the PTA medication list:  Additions: ibuprofen, valacyclovir  Modifications: none  Deletions: acetaminophen, hydrocodone-acetaminophen, tizanidine Total Time Spent: 10 minutes Information obtained from: [facility], patient interview Patient allergies: Allergies as of 10/22/2018 - Review Complete 10/22/2018 Allergen Reaction Noted  Latex Hives 04/29/2015  Hydrocodone Itching 01/05/2018  Monistat 1 (tioconazole) [tioconazole] Swelling 08/11/2015  Sulfa (sulfonamide antibiotics) Hives 07/19/2012 Prior to Admission Medications Prescriptions Last Dose Informant Patient Reported? Taking?  
ibuprofen (MOTRIN) 600 mg tablet   Yes Yes Sig: Take 600 mg by mouth every six (6) hours as needed for Pain. valACYclovir (VALTREX) 500 mg tablet   Yes Yes Sig: Take 500 mg by mouth daily. Facility-Administered Medications: None Thank you, Angelito Álvarez, PHARMD, BCPS

## 2018-10-23 NOTE — BH NOTES
GROUP THERAPY PROGRESS NOTE The patient Alexander sesay 24 y.o. female is participating in [facility] [employer]. Group time: 45 minutes Personal goal for participation: To participate in happiness game Goal orientation:  personal 
 
Group therapy participation: active Therapeutic interventions reviewed and discussed: life scenarios exploring the pursuit of happiness Impression of participation:  The patient was attentive. Yamileth Serna 10/23/2018  5:20 PM

## 2018-10-23 NOTE — CONSULTS
Medical Consult for [facility] Patient    Consult H&P   dictated, see patient chart    Impression:    Deandre Luna a 24 y.o. female with past medical history of fibromyalgia, anxiety/depression, asthma, and endometriosis presents with behavioral health problems of suicidal and homicidal ideation admitted for further psychiatric evaluation and treatment. Plan:   1. Psychiatry to manage mental health issues  2. Continuing home medications once confirmed by pharmacy/nursing. 3. Recheck K and replete as indicated. 4. Medically stable at this time, will follow up as needed. 5. No VTE prophylaxis indicated or necessary at this time.      Thank you  Gerardo Duran MD  10/23/2018, 6:02 PM

## 2018-10-23 NOTE — BH NOTES
Pt has been in room isolated most of the shift. Needs were assessed frequently during rounds that she was awake for.

## 2018-10-23 NOTE — PROGRESS NOTES
Laboratory monitoring for mood stabilizer and antipsychotics: 
 
Recommended baseline monitoring has been completed based on this patient's current medication regimen. The patient is currently taking the following medication(s):  
Current Facility-Administered Medications Medication Dose Route Frequency  lamoTRIgine (LaMICtal) tablet 25 mg  25 mg Oral DAILY  DULoxetine (CYMBALTA) capsule 20 mg  20 mg Oral DAILY Height, Weight, BMI Estimation Estimated body mass index is 21.58 kg/m² as calculated from the following: 
  Height as of this encounter: 157.5 cm (62\"). Weight as of this encounter: 53.5 kg (118 lb). Renal Function, Hepatic Function and Chemistry Estimated Creatinine Clearance: 104 mL/min (based on SCr of 0.66 mg/dL). Lab Results Component Value Date/Time Sodium 142 10/22/2018 02:30 PM  
 Potassium 3.4 (L) 10/22/2018 02:30 PM  
 Chloride 106 10/22/2018 02:30 PM  
 CO2 28 10/22/2018 02:30 PM  
 Anion gap 8 10/22/2018 02:30 PM  
 Glucose 99 10/22/2018 02:30 PM  
 BUN 9 10/22/2018 02:30 PM  
 Creatinine 0.66 10/22/2018 02:30 PM  
 BUN/Creatinine ratio 14 10/22/2018 02:30 PM  
 GFR est AA >60 10/22/2018 02:30 PM  
 GFR est non-AA >60 10/22/2018 02:30 PM  
 Calcium 8.9 10/22/2018 02:30 PM  
 ALT (SGPT) 17 10/22/2018 02:30 PM  
 AST (SGOT) 14 (L) 10/22/2018 02:30 PM  
 Alk. phosphatase 76 10/22/2018 02:30 PM  
 Protein, total 7.3 10/22/2018 02:30 PM  
 Albumin 4.1 10/22/2018 02:30 PM  
 Globulin 3.2 10/22/2018 02:30 PM  
 A-G Ratio 1.3 10/22/2018 02:30 PM  
 Bilirubin, total 0.3 10/22/2018 02:30 PM  
 
 
Lab Results Component Value Date/Time Glucose 99 10/22/2018 02:30 PM  
 Glucose (POC) 90 12/07/2015 04:40 PM  
 
 
Lab Results Component Value Date/Time Hemoglobin A1c 5.2 11/03/2015 03:20 PM  
 
 
Hematology Lab Results Component Value Date/Time  WBC 11.1 (H) 10/22/2018 02:30 PM  
 Hemoglobin (POC) 12.9 12/07/2015 04:40 PM  
 HGB 13.0 10/22/2018 02:30 PM  
 Hematocrit (POC) 38 12/07/2015 04:40 PM  
 HCT 39.0 10/22/2018 02:30 PM  
 PLATELET 791 04/55/0148 02:30 PM  
 MCV 83.5 10/22/2018 02:30 PM  
 
 
 
Thyroid Function Lab Results Component Value Date/Time TSH 0.633 08/11/2015 03:15 PM  
 
Vitals Visit Vitals /78 Pulse 75 Temp 98.1 °F (36.7 °C) Resp 18 Ht 157.5 cm (62\") Wt 53.5 kg (118 lb) SpO2 100% BMI 21.58 kg/m² Pregnancy Test 
Lab Results Component Value Date/Time Pregnancy test,urine (POC) NEGATIVE  10/22/2018 03:15 PM  
 HCG, Ql. NEGATIVE  01/04/2017 11:32 PM  
 Beta HCG, QT <1 01/26/2017 04:33 PM  
 HCG urine, Ql. (POC) Negative 02/25/2016 03:30 PM  
 
 
Jeff Mera, PharmD, BCPS 
[phone] (pharmacy)
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
## 2018-10-23 NOTE — BH NOTES
GROUP THERAPY PROGRESS NOTE Chandan Aj is participating in [employer]. Group time: 30 minutes Personal goal for participation:  Unit orientation Goal orientation: [place] Group therapy participation: Pt did not attend Therapeutic interventions reviewed and discussed: Yes Impression of participation: N/A
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
## 2018-10-23 NOTE — BH NOTES
GROUP THERAPY PROGRESS NOTE The patient Jose sesay 24 y.o. female is participating in [employer]. Group time: 1 hour Personal goal for participation: To concentrate on selected task Goal orientation: social 
 
Group therapy participation: active Therapeutic interventions reviewed and discussed: Crafts, games, music Impression of participation: The patient was attentive. Betsey Son 10/23/2018  5:55 PM

## 2018-10-24 LAB — POTASSIUM SERPL-SCNC: 3.9 MMOL/L (ref 3.5–5.1)

## 2018-10-24 PROCEDURE — 74011250637 HC RX REV CODE- 250/637: Performed by: PSYCHIATRY & NEUROLOGY

## 2018-10-24 PROCEDURE — 65220000003 HC RM SEMIPRIVATE PSYCH

## 2018-10-24 PROCEDURE — 36415 COLL VENOUS BLD VENIPUNCTURE: CPT | Performed by: FAMILY MEDICINE

## 2018-10-24 PROCEDURE — 74011250636 HC RX REV CODE- 250/636: Performed by: PSYCHIATRY & NEUROLOGY

## 2018-10-24 PROCEDURE — 90471 IMMUNIZATION ADMIN: CPT

## 2018-10-24 PROCEDURE — 90686 IIV4 VACC NO PRSV 0.5 ML IM: CPT | Performed by: PSYCHIATRY & NEUROLOGY

## 2018-10-24 PROCEDURE — 84132 ASSAY OF SERUM POTASSIUM: CPT | Performed by: FAMILY MEDICINE

## 2018-10-24 RX ORDER — LAMOTRIGINE 25 MG/1
50 TABLET ORAL DAILY
Status: DISCONTINUED | OUTPATIENT
Start: 2018-10-25 | End: 2018-10-30

## 2018-10-24 RX ORDER — ARIPIPRAZOLE 5 MG/1
5 TABLET ORAL DAILY
Status: DISCONTINUED | OUTPATIENT
Start: 2018-10-24 | End: 2018-10-25

## 2018-10-24 RX ADMIN — VALACYCLOVIR HYDROCHLORIDE 500 MG: 500 TABLET, FILM COATED ORAL at 08:58

## 2018-10-24 RX ADMIN — OLANZAPINE 5 MG: 5 TABLET, FILM COATED ORAL at 20:29

## 2018-10-24 RX ADMIN — LAMOTRIGINE 25 MG: 25 TABLET ORAL at 08:58

## 2018-10-24 RX ADMIN — INFLUENZA VIRUS VACCINE 0.5 ML: 15; 15; 15; 15 SUSPENSION INTRAMUSCULAR at 13:36

## 2018-10-24 RX ADMIN — TRAZODONE HYDROCHLORIDE 50 MG: 50 TABLET ORAL at 21:40

## 2018-10-24 RX ADMIN — ARIPIPRAZOLE 5 MG: 5 TABLET ORAL at 12:23

## 2018-10-24 RX ADMIN — OLANZAPINE 5 MG: 5 TABLET, FILM COATED ORAL at 02:51

## 2018-10-24 RX ADMIN — DULOXETINE HYDROCHLORIDE 20 MG: 20 CAPSULE, DELAYED RELEASE ORAL at 08:58

## 2018-10-24 NOTE — BH NOTES
GROUP THERAPY PROGRESS NOTE The patient Ivet Lau a 24 y.o. female is participating in [facility] Group time: 30 minutes Personal goal for participation: To discuss the daily goals Goal orientation: personal 
 
Group therapy participation: {GROUP THERAPY PARTICIPATION:[id number]} Therapeutic interventions reviewed and discussed:  Yes Impression of participation: FARHAN Rosenberg 10/23/2018  10:30 PM 
 -obstructive pancreatitis 2/2 choledocholithiasis confirmed on CT  - source of obstruction removed via ERCP yesterday  - WBC trending down, 12.8 from 16.9; Amylase 1162 from >1300, Lipase 19632 from 42259. Will continue to monitor  - alk phos, ALT, AST, T.Amadeo all trending down   - continue NPO per GI recs  - continue IV Zosyn and IVF  - continue morphine/ Dilaudid for pain and Zofran for nausea -obstructive pancreatitis 2/2 choledocholithiasis confirmed on CT  - source of obstruction removed via ERCP yesterday  - WBC trending down, 12.8 from 16.9; Amylase 1162 from >1300, Lipase 83540 from 78765. Will continue to monitor  - alk phos, ALT, AST, T.Amadeo all trending down   - continue NPO per GI recs  - continue IV Zosyn and IVF  - continue morphine/ Dilaudid for pain and Zofran for nausea  - f/u urine, blood cultures -obstructive pancreatitis 2/2 choledocholithiasis confirmed on CT  - s/p stone removal and stent placement via ERCP yesterday  - WBC trending down, 12.8 from 16.9; Amylase 1162 from >1300, Lipase 32079 from 65965. Will continue to monitor  - alk phos, ALT, AST, T.Amadeo elevated- all trending down   - advanced diet to clear liquid as per GI recs. Pt tolerating  - continue IV Zosyn and IVF w/ LR @ 150  - continue morphine/ Dilaudid for pain and Zofran for nausea  - f/u urine, blood cultures  -GI- Dr. Disla consulted, recs appreciated

## 2018-10-24 NOTE — PROGRESS NOTES
Problem: Depressed Mood (Adult/Pediatric) Goal: *STG: Remains safe in hospital 
Outcome: Not Progressing Towards Goal 
Pt had difficulty sleeping during the night. She stated she had not been sleeping during the day. She remained calm throughout the night. She slept 2 hrs
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
## 2018-10-24 NOTE — BH NOTES
GROUP THERAPY PROGRESS NOTE The patient Agapito Cadet a 24 y.o. female is participating in [employer]. Group time: 1 hour Personal goal for participation: To concentrate on selected task Goal orientation: social 
 
Group therapy participation: active Therapeutic interventions reviewed and discussed: Crafts, games, music Impression of participation: The patient was attentive. Aury Reynolds 10/24/2018  6:14 PM

## 2018-10-24 NOTE — BH NOTES
GROUP THERAPY PROGRESS NOTE The patient Hoang sesay 24 y.o. female is participating in [facility]. Group time: 45 minutes Personal goal for participation: To participate in chair exercise routine Goal orientation:  personal 
 
Group therapy participation: minimal 
 
Therapeutic interventions reviewed and discussed: benefits of exercise Impression of participation:  The patient was present-arrived late [facility] 10/24/2018  5:51 PM
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
## 2018-10-24 NOTE — CONSULTS
[address]  MR#: [id number]  : 1995  ACCOUNT #: [de-identified]   DATE OF SERVICE: 10/23/2018    REFERRING PHYSICIAN:  Lola Bernal MD    CHIEF COMPLAINT:  Suicidal and homicidal ideation. HISTORY OF PRESENT ILLNESS:  This is a 34-year-old female who presents suicidal and homicidal requiring further psychiatric evaluation and treatment. Denies any chest pain, shortness of breath, nausea, vomiting, or diarrhea. PRIMARY CARE PHYSICIAN:  Violette Orozco MD    PAST MEDICAL HISTORY:  Fibromyalgia, anxiety, depression, asthma, endometriosis, and HSV. PAST SURGICAL HISTORY:   x2, tonsillectomy, and a LEEP procedure on 2018. ALLERGIES:  LATEX, HYDROCODONE, MONISTAT, AND SULFA. MEDICATIONS:  Ibuprofen and Valtrex. SOCIAL HISTORY:  Does smoke cigarettes, half pack a day, does drink alcohol lately, approximately a bottle of wine 2 or 3 times a week, uses marijuana daily, has sniffed cocaine and heroin, last did about a month or two ago, is , has 2 kids, ages 1 and 1. Oldest kid is with father, different fathers. Patient currently unemployed, works more as a homemaker. PHYSICAL EXAMINATION:  VITAL SIGNS:  Temperature is 98.1, blood pressure 128/78, pulse 75, respirations 18, weight 118 pounds, pulse ox 100%. GENERAL:  Pleasant, no acute distress. HEENT:  Oropharynx is clear. NECK:  Supple. LUNGS:  Clear to auscultation. No wheezing, rales, or rhonchi. CARDIOVASCULAR:  Regular rate. No murmurs, gallops, or rubs. ABDOMEN:  Soft, nontender, nondistended. Normoactive bowel sounds. No hepatosplenomegaly. EXTREMITIES:  No cyanosis, clubbing, or edema. LABORATORY DATA:  Urine hCG was negative. Hemoglobin is 13.0, hematocrit is 39.0.  UA shows moderate blood, question cycle. Sodium 142, potassium 3.4, chloride 106, bicarbonate 28, BUN is 9, creatinine 0.66, glucose 99.   Tox screen is positive for marijuana. Alcohol level less than 10. IMPRESSION:  A 79-year-old female with a past medical history of fibromyalgia, herpes simplex virus, anxiety, depression, asthma, and endometriosis presents with suicidal and homicidal ideation, admitted for further psychiatric evaluation and treatment. PLAN:  1. Psychiatric management of mental health issues. 2.  Medically stable. Follow up on a p.r.n. basis. 3.  Recheck potassium, replete as indicated. 4.  No VTE prophylaxis indicated or warranted at this time. Thank you for this consult.       Airam Rocha MD DC / uR Hernandez  D: 10/23/2018 21:44     T: 10/24/2018 08:03  JOB #: [id number]

## 2018-10-24 NOTE — PROGRESS NOTES
Problem: Depressed Mood (Adult/Pediatric) Goal: *STG: Participates in treatment plan Outcome: Progressing Towards Goal 
Pt alert and visible on the unit. Pt appears restless at times. Pt is cooperative and responsive to redirections. Pt is meal and medication compliant. Took Trazadone HS. No med/beh concerns noted. Staff will continue to monitor for health and safety.

## 2018-10-25 LAB
CHOLEST SERPL-MCNC: 122 MG/DL
EST. AVERAGE GLUCOSE BLD GHB EST-MCNC: NORMAL MG/DL
HBA1C MFR BLD: 4.5 % (ref 4.2–6.3)
HDLC SERPL-MCNC: 41 MG/DL
HDLC SERPL: 3 {RATIO} (ref 0–5)
LDLC SERPL CALC-MCNC: 58.4 MG/DL (ref 0–100)
LIPID PROFILE,FLP: NORMAL
TRIGL SERPL-MCNC: 113 MG/DL (ref ?–150)
TSH SERPL DL<=0.05 MIU/L-ACNC: 0.82 UIU/ML (ref 0.36–3.74)
VLDLC SERPL CALC-MCNC: 22.6 MG/DL

## 2018-10-25 PROCEDURE — 36415 COLL VENOUS BLD VENIPUNCTURE: CPT | Performed by: PSYCHIATRY & NEUROLOGY

## 2018-10-25 PROCEDURE — 80061 LIPID PANEL: CPT | Performed by: PSYCHIATRY & NEUROLOGY

## 2018-10-25 PROCEDURE — 74011250637 HC RX REV CODE- 250/637: Performed by: PSYCHIATRY & NEUROLOGY

## 2018-10-25 PROCEDURE — 83036 HEMOGLOBIN GLYCOSYLATED A1C: CPT | Performed by: PSYCHIATRY & NEUROLOGY

## 2018-10-25 PROCEDURE — 65220000003 HC RM SEMIPRIVATE PSYCH

## 2018-10-25 PROCEDURE — 84443 ASSAY THYROID STIM HORMONE: CPT | Performed by: PSYCHIATRY & NEUROLOGY

## 2018-10-25 RX ORDER — ARIPIPRAZOLE 5 MG/1
10 TABLET ORAL DAILY
Status: DISCONTINUED | OUTPATIENT
Start: 2018-10-26 | End: 2018-10-27

## 2018-10-25 RX ORDER — CLONAZEPAM 0.5 MG/1
0.5 TABLET ORAL EVERY 12 HOURS
Status: DISCONTINUED | OUTPATIENT
Start: 2018-10-25 | End: 2018-10-29

## 2018-10-25 RX ADMIN — LAMOTRIGINE 50 MG: 25 TABLET ORAL at 09:25

## 2018-10-25 RX ADMIN — CLONAZEPAM 0.5 MG: 0.5 TABLET ORAL at 21:08

## 2018-10-25 RX ADMIN — VALACYCLOVIR HYDROCHLORIDE 500 MG: 500 TABLET, FILM COATED ORAL at 09:25

## 2018-10-25 RX ADMIN — GABAPENTIN 400 MG: 300 CAPSULE ORAL at 08:14

## 2018-10-25 RX ADMIN — TRAZODONE HYDROCHLORIDE 50 MG: 50 TABLET ORAL at 21:09

## 2018-10-25 RX ADMIN — DULOXETINE HYDROCHLORIDE 20 MG: 20 CAPSULE, DELAYED RELEASE ORAL at 09:25

## 2018-10-25 RX ADMIN — CLONAZEPAM 0.5 MG: 0.5 TABLET ORAL at 12:01

## 2018-10-25 RX ADMIN — ARIPIPRAZOLE 5 MG: 5 TABLET ORAL at 09:25

## 2018-10-25 RX ADMIN — OLANZAPINE 5 MG: 5 TABLET, FILM COATED ORAL at 08:14

## 2018-10-25 RX ADMIN — OLANZAPINE 5 MG: 5 TABLET, FILM COATED ORAL at 17:51

## 2018-10-25 NOTE — BH NOTES
Patient is alert and oriented. Medication and meal compliant. Denies SI / HI, pain or discomfort. Patient was in tears and distressed reporting that she can not take it loud noise and big groups and requested for PRN medication. Ativan and zyprexa po given and encouraged to stay in her room for less stimulation. Pt agreed and PRN was effective. Continue to monitor the patient's status and assess needs.
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
## 2018-10-25 NOTE — BH NOTES
Remained in bed resting quietly x 4.5 hrs , monitored q15 minutes. Pt had lab work done tolerated well

## 2018-10-25 NOTE — PROGRESS NOTES
Problem: Depressed Mood (Adult/Pediatric) Goal: *STG: Remains safe in hospital 
Outcome: Progressing Towards Goal 
Patient alert and oriented. Denies HI/SI. No behavioral issues at this time. Compliant with meals and medications. Will continue assessments and safety checks.

## 2018-10-25 NOTE — PROGRESS NOTES
Patient anxious, tearful and hyperverbal. Admits to paranoid delusions stating that the television was talking to her telling her to \"stand up if she has something to say. \" Patient hyperventilating and complaining of nausea. Instructed to take slow deep breaths. When asked about SI/HI, patient stated she \"nayely feels suicidal.\" Patient given 400 mg Neurontin PO and 5 mg Zyprexa PO for anxiety and psychosis. 5685- Patient calm and and no longer hyperverbal or hyperventilating. States she doesn't feel as anxious as earlier this morning. Denies any SI/HI. Patient instructed to reach out to staff if she become anxious, paranoid or suicidal. Patient asked if she's allowed to call her  but stated that she does not want him to contact her. Patient informed that he is not allowed to contact her and that staff is not allowed to divulge any information to him without her permission and the code number.

## 2018-10-25 NOTE — PROGRESS NOTES
Problem: Depressed Mood (Adult/Pediatric) Goal: *LTG: Returns to previous level of functioning and participates with after care plan Outcome: Progressing Towards Goal 
Patient alert and oriented. Denies SI/HI. Compliant with meals and medications. No behavioral issues at this time. Will continue assessments and safety checks.

## 2018-10-26 PROCEDURE — 65220000003 HC RM SEMIPRIVATE PSYCH

## 2018-10-26 PROCEDURE — 74011250637 HC RX REV CODE- 250/637: Performed by: PSYCHIATRY & NEUROLOGY

## 2018-10-26 PROCEDURE — 74011250636 HC RX REV CODE- 250/636: Performed by: PSYCHIATRY & NEUROLOGY

## 2018-10-26 RX ADMIN — CLONAZEPAM 0.5 MG: 0.5 TABLET ORAL at 08:14

## 2018-10-26 RX ADMIN — ARIPIPRAZOLE 10 MG: 5 TABLET ORAL at 08:14

## 2018-10-26 RX ADMIN — LORAZEPAM 2 MG: 2 INJECTION INTRAMUSCULAR; INTRAVENOUS at 16:10

## 2018-10-26 RX ADMIN — OLANZAPINE 5 MG: 5 TABLET, FILM COATED ORAL at 21:07

## 2018-10-26 RX ADMIN — OLANZAPINE 5 MG: 5 TABLET, FILM COATED ORAL at 10:56

## 2018-10-26 RX ADMIN — VALACYCLOVIR HYDROCHLORIDE 500 MG: 500 TABLET, FILM COATED ORAL at 08:14

## 2018-10-26 RX ADMIN — GABAPENTIN 400 MG: 300 CAPSULE ORAL at 03:54

## 2018-10-26 RX ADMIN — GABAPENTIN 400 MG: 300 CAPSULE ORAL at 21:08

## 2018-10-26 RX ADMIN — LAMOTRIGINE 50 MG: 25 TABLET ORAL at 08:14

## 2018-10-26 RX ADMIN — OLANZAPINE 5 MG: 5 TABLET, FILM COATED ORAL at 03:54

## 2018-10-26 RX ADMIN — DULOXETINE HYDROCHLORIDE 20 MG: 20 CAPSULE, DELAYED RELEASE ORAL at 08:14

## 2018-10-26 RX ADMIN — TRAZODONE HYDROCHLORIDE 50 MG: 50 TABLET ORAL at 21:36

## 2018-10-26 RX ADMIN — CLONAZEPAM 0.5 MG: 0.5 TABLET ORAL at 21:07

## 2018-10-26 NOTE — PROGRESS NOTES
Problem: Depressed Mood (Adult/Pediatric) Goal: *STG: Complies with medication therapy Outcome: Progressing Towards Goal 
Patient alert and oriented. Currently talking to staff in the corrales. Patient compliant with meals and medications. Denies SI/HI; admits to hearing voices. No behavioral issues at this time. Will continue assessments and safety checks.

## 2018-10-26 NOTE — BH NOTES
Social Work Pt was seen in treatment team this morning. Pt is alert and oriented to self. Pt denies SI/HI. Pt's mood is anxious & labile, affect is congruent to mood. Pt tearful one minute and laughing one minute. Pt's thought process is delusional, paranoid and still presenting with psychosis. Pt's insight fair to poor and judgment is poor, reliability is poor. Writer left mother 2 voice mails in attempt to update. Social work department will continue to coordinate discharge plans.

## 2018-10-26 NOTE — BH NOTES
1610 Patient was in the corrales crying. Offered her some Ativan and she agreed so it was given. 1710 patient out in the corrales smiling and appreciated the medication. She said it helped.

## 2018-10-26 NOTE — BH NOTES
Pt up crying appears very anxious medicated with neurontin & zyprexa, meds appear to be effective pt currently resting,

## 2018-10-26 NOTE — BH NOTES
GROUP THERAPY PROGRESS NOTE The patient Calvin sesay 24 y.o. female is participating in [address] [place] [address]. Group time: 45 minutes Personal goal for participation: To participate in relaxation activity Goal orientation:  relaxation Group therapy participation: active Therapeutic interventions reviewed and discussed: favorite ways to relax Impression of participation:  The patient was attentive. Yessenia Bonilla 10/26/2018  6:15 PM

## 2018-10-26 NOTE — BH NOTES
Patient came out of rounds crying and asked for a PRN. One was given and reassured patient that it does get better. Patient was polite and thanked me.

## 2018-10-26 NOTE — PROGRESS NOTES
Patient extremely anxious, tearful and upset. Came to staff stating that she feels like harming herself and that she wants to die. 2 mg ativan IM given for severe agitation/anxiety. Staff sat and spoke with patient who stated tearfully that she wants her mom. Staff informed patient that they will attempt to help her call her mother when she is off work later this evening. This appeared to comfort patient. Patient currently resting quietly in her room.
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
## 2018-10-26 NOTE — PROGRESS NOTES
Problem: Depressed Mood (Adult/Pediatric) Goal: *STG: Demonstrates reduction in symptoms and increase in insight into coping skills/future focused Outcome: Progressing Towards Goal 
Patient alert and oriented. Denies SI/HI. Compliant with meals and medications. No behavioral issues at this time. Will continue assessments and safety checks.

## 2018-10-27 PROCEDURE — 74011250636 HC RX REV CODE- 250/636: Performed by: PSYCHIATRY & NEUROLOGY

## 2018-10-27 PROCEDURE — 74011250637 HC RX REV CODE- 250/637: Performed by: PSYCHIATRY & NEUROLOGY

## 2018-10-27 PROCEDURE — 65220000003 HC RM SEMIPRIVATE PSYCH

## 2018-10-27 RX ORDER — ARIPIPRAZOLE 15 MG/1
15 TABLET ORAL DAILY
Status: DISCONTINUED | OUTPATIENT
Start: 2018-10-28 | End: 2018-10-29

## 2018-10-27 RX ORDER — INSULIN LISPRO 100 [IU]/ML
INJECTION, SOLUTION INTRAVENOUS; SUBCUTANEOUS
Status: DISCONTINUED | OUTPATIENT
Start: 2018-10-27 | End: 2018-10-27

## 2018-10-27 RX ORDER — INSULIN LISPRO 100 [IU]/ML
10 INJECTION, SOLUTION INTRAVENOUS; SUBCUTANEOUS
Status: DISCONTINUED | OUTPATIENT
Start: 2018-10-27 | End: 2018-10-27

## 2018-10-27 RX ORDER — DEXTROSE 50 % IN WATER (D50W) INTRAVENOUS SYRINGE
12.5-25 AS NEEDED
Status: DISCONTINUED | OUTPATIENT
Start: 2018-10-27 | End: 2018-10-27

## 2018-10-27 RX ORDER — MAGNESIUM SULFATE 100 %
4 CRYSTALS MISCELLANEOUS AS NEEDED
Status: DISCONTINUED | OUTPATIENT
Start: 2018-10-27 | End: 2018-10-27

## 2018-10-27 RX ADMIN — IBUPROFEN 400 MG: 400 TABLET ORAL at 09:31

## 2018-10-27 RX ADMIN — CLONAZEPAM 0.5 MG: 0.5 TABLET ORAL at 09:31

## 2018-10-27 RX ADMIN — OLANZAPINE 5 MG: 5 TABLET, FILM COATED ORAL at 21:51

## 2018-10-27 RX ADMIN — DULOXETINE HYDROCHLORIDE 20 MG: 20 CAPSULE, DELAYED RELEASE ORAL at 09:31

## 2018-10-27 RX ADMIN — GABAPENTIN 400 MG: 300 CAPSULE ORAL at 15:24

## 2018-10-27 RX ADMIN — MAGNESIUM HYDROXIDE 30 ML: 400 SUSPENSION ORAL at 09:31

## 2018-10-27 RX ADMIN — TRAZODONE HYDROCHLORIDE 50 MG: 50 TABLET ORAL at 21:51

## 2018-10-27 RX ADMIN — VALACYCLOVIR HYDROCHLORIDE 500 MG: 500 TABLET, FILM COATED ORAL at 09:31

## 2018-10-27 RX ADMIN — ARIPIPRAZOLE 10 MG: 5 TABLET ORAL at 09:31

## 2018-10-27 RX ADMIN — LORAZEPAM 2 MG: 2 INJECTION INTRAMUSCULAR; INTRAVENOUS at 10:46

## 2018-10-27 RX ADMIN — LAMOTRIGINE 50 MG: 25 TABLET ORAL at 09:31

## 2018-10-27 RX ADMIN — CLONAZEPAM 0.5 MG: 0.5 TABLET ORAL at 20:09

## 2018-10-27 NOTE — BH NOTES
GROUP THERAPY PROGRESS NOTE Hoang Peraltachilla is participating in [employer]. Group time: 30 minutes Personal goal for participation:  Unit orientation Goal orientation: [place] Group therapy participation: active Therapeutic interventions reviewed and discussed: Yes Impression of participation: good
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
## 2018-10-27 NOTE — PROGRESS NOTES
Problem: Depressed Mood (Adult/Pediatric) Goal: *STG: Remains safe in hospital 
Outcome: Progressing Towards Goal 
Pt rested quietly in bed with eyes closed. No signs/symptoms of distress, agitation, or anxiety. Will monitor for changes. Q 15 minute checks continue. PT slept 6.5 hrs

## 2018-10-27 NOTE — BH NOTES
Patient was visible in the milieu socializing with peers and continue to exhibit intrusive behavior. Patient sometimes needs constant redirection in the milieu. Patient requested for prn medications. Prn medications administered. Staff will continue to monitor patient.

## 2018-10-28 PROBLEM — F32.3 DEPRESSIVE PSYCHOSIS (HCC): Status: ACTIVE | Noted: 2018-10-22

## 2018-10-28 PROCEDURE — 74011250637 HC RX REV CODE- 250/637: Performed by: PSYCHIATRY & NEUROLOGY

## 2018-10-28 PROCEDURE — 65220000003 HC RM SEMIPRIVATE PSYCH

## 2018-10-28 RX ADMIN — CLONAZEPAM 0.5 MG: 0.5 TABLET ORAL at 20:19

## 2018-10-28 RX ADMIN — VALACYCLOVIR HYDROCHLORIDE 500 MG: 500 TABLET, FILM COATED ORAL at 08:36

## 2018-10-28 RX ADMIN — CLONAZEPAM 0.5 MG: 0.5 TABLET ORAL at 08:36

## 2018-10-28 RX ADMIN — LAMOTRIGINE 50 MG: 25 TABLET ORAL at 08:35

## 2018-10-28 RX ADMIN — IBUPROFEN 400 MG: 400 TABLET ORAL at 08:36

## 2018-10-28 RX ADMIN — OLANZAPINE 5 MG: 5 TABLET, FILM COATED ORAL at 08:35

## 2018-10-28 RX ADMIN — ARIPIPRAZOLE 15 MG: 15 TABLET ORAL at 08:35

## 2018-10-28 RX ADMIN — OLANZAPINE 5 MG: 5 TABLET, FILM COATED ORAL at 16:41

## 2018-10-28 RX ADMIN — DULOXETINE HYDROCHLORIDE 20 MG: 20 CAPSULE, DELAYED RELEASE ORAL at 08:36

## 2018-10-28 RX ADMIN — TRAZODONE HYDROCHLORIDE 50 MG: 50 TABLET ORAL at 20:19

## 2018-10-28 NOTE — BH NOTES
Pt gave her wedding ring to her mother during her visit for safe keeping on this evening shift. Mom reported and showed the ring to staff as she was leaving.

## 2018-10-28 NOTE — BH NOTES
At start of shift pt vomited in the paper bag in her room. She reported it was anxiety related. Pt reported she was anxious due to an expected visit from her mother, that she requested. Pt was seen sitting at her door with her vomit, rubbing the wall. Pt requested a shot of Ativan. Pt was encouraged to rest and given a variety of coping skills to try until she was ready for her visit. During the conversation pt reported she was having S/I. She initially denied having a plan, then reported she wanted to wrap her jacket sleeves around her neck. She willingly gave her coat to staff. Pt contracted for safety while on the unit. Staff informed that pt should remain in LOS until she calmed or went to bed. Mom was informed of the situation. Pt visited with her mom in the corrales due to the noise and volume of people in the dayroom. Pt became irate during visit with her Mom. The visit was cut short and her mother opted to leave. Pt was given her HS dose of Klonopin and encouraged to rest. Pt then reported feeling better after talking to staff. Pt requested to color in the dayroom. Her mood improved and LOS was discontinued. Staff will continue to monitor with Q15 min checks.

## 2018-10-28 NOTE — PROGRESS NOTES
Problem: Depressed Mood (Adult/Pediatric) Goal: *STG: Participates in treatment plan Outcome: Progressing Towards Goal 
Remained in bed resting quietly. Patient slept 7 hours this shift. Staff will continue to monitor q15 minutes for safety.
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
## 2018-10-28 NOTE — BH NOTES
GROUP THERAPY PROGRESS NOTE The patient Tito Miller is participating in [facility]. Group time: 30 minutes Personal goal for participation: to orient the patient to the unit. Goal orientation: successful adoption of unit rules Group therapy participation: active Therapeutic interventions reviewed and discussed: Yes Impression of participation:  
 
Cait De León 10/28/2018 9:56 AM

## 2018-10-29 ENCOUNTER — APPOINTMENT (OUTPATIENT)
Dept: CT IMAGING | Age: 23
DRG: 753 | End: 2018-10-29
Attending: INTERNAL MEDICINE
Payer: MEDICAID

## 2018-10-29 PROCEDURE — 74011250636 HC RX REV CODE- 250/636: Performed by: PSYCHIATRY & NEUROLOGY

## 2018-10-29 PROCEDURE — 74011250637 HC RX REV CODE- 250/637: Performed by: PSYCHIATRY & NEUROLOGY

## 2018-10-29 PROCEDURE — 65220000003 HC RM SEMIPRIVATE PSYCH

## 2018-10-29 PROCEDURE — 70450 CT HEAD/BRAIN W/O DYE: CPT

## 2018-10-29 RX ORDER — CLONAZEPAM 0.5 MG/1
0.5 TABLET ORAL 3 TIMES DAILY
Status: DISCONTINUED | OUTPATIENT
Start: 2018-10-29 | End: 2018-10-31

## 2018-10-29 RX ADMIN — GABAPENTIN 400 MG: 300 CAPSULE ORAL at 19:38

## 2018-10-29 RX ADMIN — CLONAZEPAM 0.5 MG: 0.5 TABLET ORAL at 16:53

## 2018-10-29 RX ADMIN — LORAZEPAM 2 MG: 2 INJECTION INTRAMUSCULAR; INTRAVENOUS at 10:06

## 2018-10-29 RX ADMIN — CLONAZEPAM 0.5 MG: 0.5 TABLET ORAL at 11:36

## 2018-10-29 RX ADMIN — OLANZAPINE 5 MG: 5 TABLET, FILM COATED ORAL at 07:52

## 2018-10-29 RX ADMIN — TRAZODONE HYDROCHLORIDE 50 MG: 50 TABLET ORAL at 21:14

## 2018-10-29 RX ADMIN — CLONAZEPAM 0.5 MG: 0.5 TABLET ORAL at 08:48

## 2018-10-29 RX ADMIN — GABAPENTIN 400 MG: 300 CAPSULE ORAL at 07:52

## 2018-10-29 RX ADMIN — VALACYCLOVIR HYDROCHLORIDE 500 MG: 500 TABLET, FILM COATED ORAL at 08:48

## 2018-10-29 RX ADMIN — LAMOTRIGINE 50 MG: 25 TABLET ORAL at 08:48

## 2018-10-29 RX ADMIN — ARIPIPRAZOLE 15 MG: 15 TABLET ORAL at 08:48

## 2018-10-29 RX ADMIN — OLANZAPINE 5 MG: 5 TABLET, FILM COATED ORAL at 19:38

## 2018-10-29 NOTE — PROGRESS NOTES
Pnt ambulatory verbal alert and oriented RR even and unlabored Denies any complaints @ this time Pnt stable No behavioral issues noted Will continue to monitor for pnt safety NADN
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
## 2018-10-29 NOTE — PROGRESS NOTES
Problem: Depressed Mood (Adult/Pediatric) Goal: *STG: Demonstrates reduction in symptoms and increase in insight into coping skills/future focused Outcome: Not Progressing Towards Goal 
Patient has been med and meal compliant. She has been labile and behavior unpredictable. She has been somatic,tearful and no insight into her behavior. She appears to be seeking attention by throwing herself on floor in dayroom as reported by another peer. She complains of being confused but able to verbalize what has happened. Rambling from subject to subject on various topics. Physician was notified regarding incident. Will continue to monitor patient and behavior every 15 minutes for safety.
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
## 2018-10-29 NOTE — PROGRESS NOTES
General Daily Progress Note Admit Date: 10/22/2018 Subjective:  
 
Patient had suffered a fall and injury to the top of head. She c/o constant pain to the top of head. No LOC. Current Facility-Administered Medications Medication Dose Route Frequency  ARIPiprazole (ABILIFY) tablet 15 mg  15 mg Oral DAILY  clonazePAM (KlonoPIN) tablet 0.5 mg  0.5 mg Oral Q12H  lamoTRIgine (LaMICtal) tablet 50 mg  50 mg Oral DAILY  traZODone (DESYREL) tablet 50 mg  50 mg Oral QHS PRN  
 gabapentin (NEURONTIN) capsule 400 mg  400 mg Oral Q8H PRN  
 valACYclovir (VALTREX) tablet 500 mg  500 mg Oral DAILY  ziprasidone (GEODON) 20 mg in sterile water (preservative free) 1 mL injection  20 mg IntraMUSCular BID PRN  
 OLANZapine (ZyPREXA) tablet 5 mg  5 mg Oral Q6H PRN  
 benztropine (COGENTIN) tablet 2 mg  2 mg Oral BID PRN  
 benztropine (COGENTIN) injection 2 mg  2 mg IntraMUSCular BID PRN  
 LORazepam (ATIVAN) injection 2 mg  2 mg IntraMUSCular Q4H PRN  
 acetaminophen (TYLENOL) tablet 650 mg  650 mg Oral Q4H PRN  
 ibuprofen (MOTRIN) tablet 400 mg  400 mg Oral Q8H PRN  
 magnesium hydroxide (MILK OF MAGNESIA) 400 mg/5 mL oral suspension 30 mL  30 mL Oral DAILY PRN  
 nicotine (NICODERM CQ) 21 mg/24 hr patch 1 Patch  1 Patch TransDERmal DAILY PRN Objective:  
 
No data found. No intake/output data recorded. No intake/output data recorded. Physical Exam:  
Visit Vitals /74 Pulse 99 Temp 97.3 °F (36.3 °C) Resp 18 Ht 5' 2\" (1.575 m) Wt 53.5 kg (118 lb) SpO2 100% BMI 21.58 kg/m² General:  Alert, cooperative, no distress, appears stated age. Head:  Normocephalic, without obvious abnormality, atraumatic. Tender right parietal skull. Eyes:  Conjunctivae/corneas clear. PERRL, EOMs intact. Neuro: non focal.                    
Lungs:   Clear to auscultation bilaterally. Chest wall:  No tenderness or deformity.   
Heart:  Regular rate and rhythm, S1, S2 normal, no murmur, click, rub or gallop. Abdomen:   Soft, non-tender. Bowel sounds normal. No masses,  No organomegaly. Extremities: Extremities normal, atraumatic, no cyanosis or edema. Pulses: 2+ and symmetric all extremities. Skin: Skin color, texture, turgor normal. No rashes or lesions No results found for this or any previous visit (from the past 24 hour(s)). Assessment:  
 
Principal Problem: 
  Depressive psychosis ([facility] [address] (10/22/2018) Active Problems: 
  Borderline personality disorder in adult [facility]) (10/23/2018) Cannabis use disorder, mild, abuse (10/23/2018) Head injury with No LOC and persistent pain Plan:  
 
Neuro checks.  
 
CT of head noncontast.

## 2018-10-29 NOTE — PROGRESS NOTES
Problem: Depressed Mood (Adult/Pediatric) Goal: *STG: Attends activities and groups Outcome: Progressing Towards Goal 
Patient alert and oriented. Compliant with meals and medications. Denies HI/SI. No behavioral issues at this time. Will continue assessments and safety checks.

## 2018-10-29 NOTE — PROGRESS NOTES
Problem: Depressed Mood (Adult/Pediatric) Goal: *STG: Remains safe in hospital 
Outcome: Progressing Towards Goal 
Patient alert and oriented. Remains labile, paranoid and easily upset. Currently calm and cooperative. Denies SI/HI. No behavioral issues at this time. Will continue assessments and safety checks.
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
## 2018-10-29 NOTE — BH NOTES
GROUP THERAPY PROGRESS NOTE The patient Deandre sesay 24 y.o. female is participating in [employer]. Group time: 1 hour Personal goal for participation: To concentrate on selected task Goal orientation: social 
 
Group therapy participation: active Therapeutic interventions reviewed and discussed: Crafts, games, music Impression of participation: The patient was attentive. Tiffanie Peñaloza 10/29/2018  5:38 PM

## 2018-10-29 NOTE — BH NOTES
GROUP THERAPY PROGRESS NOTE The patient Jeanne Carrero a 24 y.o. female is participating in [facility] [employer]. Group time: 45 minutes Personal goal for participation: To participate in happiness game Goal orientation:  personal 
 
Group therapy participation: active Therapeutic interventions reviewed and discussed: life scenarios exploring the pursuit of happiness Impression of participation:  The patient was attentive. Cullen Ingram 10/29/2018  5:18 PM

## 2018-10-30 PROCEDURE — 65220000003 HC RM SEMIPRIVATE PSYCH

## 2018-10-30 PROCEDURE — 74011250637 HC RX REV CODE- 250/637: Performed by: PSYCHIATRY & NEUROLOGY

## 2018-10-30 PROCEDURE — 74011250636 HC RX REV CODE- 250/636: Performed by: PSYCHIATRY & NEUROLOGY

## 2018-10-30 RX ORDER — RISPERIDONE 1 MG/1
1 TABLET, FILM COATED ORAL EVERY 12 HOURS
Status: DISCONTINUED | OUTPATIENT
Start: 2018-10-30 | End: 2018-11-01

## 2018-10-30 RX ORDER — LAMOTRIGINE 25 MG/1
75 TABLET ORAL DAILY
Status: DISCONTINUED | OUTPATIENT
Start: 2018-10-31 | End: 2018-11-02 | Stop reason: HOSPADM

## 2018-10-30 RX ADMIN — LORAZEPAM 2 MG: 2 INJECTION INTRAMUSCULAR; INTRAVENOUS at 18:55

## 2018-10-30 RX ADMIN — TRAZODONE HYDROCHLORIDE 50 MG: 50 TABLET ORAL at 21:07

## 2018-10-30 RX ADMIN — ACETAMINOPHEN 650 MG: 325 TABLET, FILM COATED ORAL at 18:40

## 2018-10-30 RX ADMIN — CLONAZEPAM 0.5 MG: 0.5 TABLET ORAL at 08:55

## 2018-10-30 RX ADMIN — ARIPIPRAZOLE 20 MG: 15 TABLET ORAL at 08:55

## 2018-10-30 RX ADMIN — GABAPENTIN 400 MG: 300 CAPSULE ORAL at 15:43

## 2018-10-30 RX ADMIN — CLONAZEPAM 0.5 MG: 0.5 TABLET ORAL at 17:23

## 2018-10-30 RX ADMIN — CLONAZEPAM 0.5 MG: 0.5 TABLET ORAL at 12:43

## 2018-10-30 RX ADMIN — RISPERIDONE 1 MG: 1 TABLET ORAL at 12:43

## 2018-10-30 RX ADMIN — IBUPROFEN 400 MG: 400 TABLET ORAL at 13:37

## 2018-10-30 RX ADMIN — RISPERIDONE 1 MG: 1 TABLET ORAL at 21:07

## 2018-10-30 RX ADMIN — LAMOTRIGINE 50 MG: 25 TABLET ORAL at 08:55

## 2018-10-30 RX ADMIN — VALACYCLOVIR HYDROCHLORIDE 500 MG: 500 TABLET, FILM COATED ORAL at 08:55

## 2018-10-30 NOTE — BH NOTES
Pt received Ativan 2mg IM to the left leg for severe agitation @ 1900. Pt was crying hysterically, with rapid shallow breathing. Pt was pacing and attempting to hide near staff after being upset by the acuity of the unit. Pt attempted to utilize breathing and relaxation techniques but were ineffective. Pt was educated by the [employer] and counseled r/t pts fear that her stay here would be affected by her need for PRN medications. Pt cooperative for injection and redirection by this RN.

## 2018-10-30 NOTE — PROGRESS NOTES
Problem: Depressed Mood (Adult/Pediatric) Goal: *STG: Demonstrates reduction in symptoms and increase in insight into coping skills/future focused Outcome: Progressing Towards Goal 
Patient is alert and oriented. Received Zyprexa PO for C/O anxiety and Trazodone PO at HS for C/O Insomnia. Medication and meal compliant. Denies SI / HI, pain or discomfort. Continue to monitor the patient's status and assess needs.

## 2018-10-30 NOTE — BH NOTES
GROUP THERAPY PROGRESS NOTE The patient Cayla Walden a 24 y.o. female is participating in [employer]. Group time: 1 hour Personal goal for participation: To concentrate on selected task Goal orientation: social 
 
Group therapy participation: minimal 
 
Therapeutic interventions reviewed and discussed: Crafts, games, music Impression of participation: The patient was present-arrived late-anxiety noted/tearful Calista Salcido 10/30/2018  5:43 PM
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
## 2018-10-30 NOTE — PROGRESS NOTES
Problem: Depressed Mood (Adult/Pediatric) Goal: *STG: Participates in treatment plan Outcome: Progressing Towards Goal 
Pt progressing towards goals, participating in treatment team, asking questions and showing interest in treatment. Pt receptive to medication changes and teaching.

## 2018-10-30 NOTE — BH NOTES
GROUP THERAPY PROGRESS NOTE The patient Peter Clifton a 24 y.o. female is participating in [address] [place] [address]. Group time: 45 minutes Personal goal for participation: To identify positive coping strategies a-z 
 
Goal orientation:  personal 
 
Group therapy participation: minimal 
 
Therapeutic interventions reviewed and discussed: worksheet Impression of participation:  The patient was present-arrived late [place] 10/30/2018  5:20 PM

## 2018-10-30 NOTE — PROGRESS NOTES
Diet as tolerated. Pt noted to be meal compliant. Hx unremarkable per nutrition. Ht: 5'2\" Wt: 118 lb BMI: 21.58 kg/(m^2) c/w normal weight Est energy needs:  1555 kcal, 52 g protein, 1 mL/kcal fluids Pt will consume > 50% of meals at follow up 7-10 days
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
## 2018-10-30 NOTE — PROGRESS NOTES
Problem: Depressed Mood (Adult/Pediatric) Goal: *STG: Verbalizes anger, guilt, and other feelings in a constructive manor Outcome: Progressing Towards Goal 
Pt is alert and oriented, affect and mood labile and pt is intrusive and goes from staff to staff asking to have her needs mets. Pt has had several tearful crying outbursts and finally acted out rocking on the floor and crying and yelling. Pt received prn ativan. Pt requested information about schizoaffective d/o and became angry when directed to talk to treatment team in morning. Pt believes she has special privileges but directed to use coping skills to deal with her increased anxiety. Pt has been compliant with meds and meals. Observe on q15' rounds. Assess mood and behavior. Assist to reality test. Encourage compliance with meds and groups.

## 2018-10-31 PROCEDURE — 74011250637 HC RX REV CODE- 250/637: Performed by: PSYCHIATRY & NEUROLOGY

## 2018-10-31 PROCEDURE — 65220000003 HC RM SEMIPRIVATE PSYCH

## 2018-10-31 RX ORDER — LORAZEPAM 0.5 MG/1
0.5 TABLET ORAL 3 TIMES DAILY
Status: DISCONTINUED | OUTPATIENT
Start: 2018-10-31 | End: 2018-11-02

## 2018-10-31 RX ORDER — ARIPIPRAZOLE 15 MG/1
15 TABLET ORAL DAILY
Status: DISCONTINUED | OUTPATIENT
Start: 2018-11-01 | End: 2018-11-01

## 2018-10-31 RX ADMIN — ACETAMINOPHEN 650 MG: 325 TABLET, FILM COATED ORAL at 16:13

## 2018-10-31 RX ADMIN — TRAZODONE HYDROCHLORIDE 50 MG: 50 TABLET ORAL at 21:12

## 2018-10-31 RX ADMIN — CLONAZEPAM 0.5 MG: 0.5 TABLET ORAL at 07:36

## 2018-10-31 RX ADMIN — ARIPIPRAZOLE 20 MG: 15 TABLET ORAL at 07:35

## 2018-10-31 RX ADMIN — LAMOTRIGINE 75 MG: 25 TABLET ORAL at 07:35

## 2018-10-31 RX ADMIN — RISPERIDONE 1 MG: 1 TABLET ORAL at 21:12

## 2018-10-31 RX ADMIN — LORAZEPAM 0.5 MG: 0.5 TABLET ORAL at 11:29

## 2018-10-31 RX ADMIN — GABAPENTIN 400 MG: 300 CAPSULE ORAL at 14:43

## 2018-10-31 RX ADMIN — RISPERIDONE 1 MG: 1 TABLET ORAL at 08:11

## 2018-10-31 RX ADMIN — VALACYCLOVIR HYDROCHLORIDE 500 MG: 500 TABLET, FILM COATED ORAL at 07:35

## 2018-10-31 RX ADMIN — LORAZEPAM 0.5 MG: 0.5 TABLET ORAL at 16:13

## 2018-10-31 NOTE — BH NOTES
GROUP THERAPY PROGRESS NOTE The patient Jalen sesay 24 y.o. female is participating in [employer]. Group time: 1 hour Personal goal for participation: To concentrate on selected task Goal orientation: social 
 
Group therapy participation: active Therapeutic interventions reviewed and discussed: Crafts, games, music Impression of participation: The patient was attentive. Jessie Short 10/31/2018  5:13 PM
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
## 2018-10-31 NOTE — PROGRESS NOTES
Patient actively participated in spirituality group on October 31, 2016 Merged with [facility]. SEYMOUR Art, [facility],  [facility]  Paging Service  [phone])

## 2018-10-31 NOTE — BH NOTES
GROUP THERAPY PROGRESS NOTE The patient Yudelka sesay 24 y.o. female is participating in [address] [place], [place] [address]. Group time: 45 minutes Personal goal for participation: To participate in relaxation activity Goal orientation:  relaxation Group therapy participation: active Therapeutic interventions reviewed and discussed: favorite ways to relax Impression of participation:  The patient was attentive. Demetria Wolf 10/31/2018  2:01 PM

## 2018-10-31 NOTE — PROGRESS NOTES
Problem: Depressed Mood (Adult/Pediatric) Goal: *STG: Verbalizes anger, guilt, and other feelings in a constructive manor Outcome: Progressing Towards Goal 
Pt was alert, labile and tearful this shift. Pt was focused on dx and medication. Pt HR elevated at 111. Pt was meal and medication compliant. PRN Trazodone given. Staff will continue to monitor for health and safety.

## 2018-10-31 NOTE — BH NOTES
Pt skin assessed on chest area and neck were small raised areas slightly reddened. She stated there was some itching.  This will be reported to next shift nurse and MD

## 2018-10-31 NOTE — PROGRESS NOTES
Problem: Depressed Mood (Adult/Pediatric) Goal: *STG: Remains safe in hospital 
Outcome: Progressing Towards Goal 
Pt slept 7 hours. No medical or behavioral concerns expressed overnight. Staff will continue to monitor for health and safety.

## 2018-11-01 PROCEDURE — 74011250637 HC RX REV CODE- 250/637: Performed by: PSYCHIATRY & NEUROLOGY

## 2018-11-01 PROCEDURE — 74011250636 HC RX REV CODE- 250/636: Performed by: PSYCHIATRY & NEUROLOGY

## 2018-11-01 PROCEDURE — 65220000003 HC RM SEMIPRIVATE PSYCH

## 2018-11-01 RX ORDER — RISPERIDONE 1 MG/1
2 TABLET, FILM COATED ORAL EVERY 12 HOURS
Status: DISCONTINUED | OUTPATIENT
Start: 2018-11-01 | End: 2018-11-02 | Stop reason: HOSPADM

## 2018-11-01 RX ORDER — ARIPIPRAZOLE 5 MG/1
10 TABLET ORAL DAILY
Status: DISCONTINUED | OUTPATIENT
Start: 2018-11-02 | End: 2018-11-02

## 2018-11-01 RX ADMIN — VALACYCLOVIR HYDROCHLORIDE 500 MG: 500 TABLET, FILM COATED ORAL at 08:12

## 2018-11-01 RX ADMIN — RISPERIDONE 1 MG: 1 TABLET ORAL at 08:12

## 2018-11-01 RX ADMIN — ACETAMINOPHEN 650 MG: 325 TABLET, FILM COATED ORAL at 05:29

## 2018-11-01 RX ADMIN — LORAZEPAM 0.5 MG: 0.5 TABLET ORAL at 11:44

## 2018-11-01 RX ADMIN — LAMOTRIGINE 75 MG: 25 TABLET ORAL at 08:12

## 2018-11-01 RX ADMIN — TRAZODONE HYDROCHLORIDE 50 MG: 50 TABLET ORAL at 21:04

## 2018-11-01 RX ADMIN — LORAZEPAM 0.5 MG: 0.5 TABLET ORAL at 16:18

## 2018-11-01 RX ADMIN — LORAZEPAM 0.5 MG: 0.5 TABLET ORAL at 08:12

## 2018-11-01 RX ADMIN — ARIPIPRAZOLE 15 MG: 15 TABLET ORAL at 08:12

## 2018-11-01 RX ADMIN — GABAPENTIN 400 MG: 300 CAPSULE ORAL at 16:22

## 2018-11-01 RX ADMIN — RISPERIDONE 2 MG: 1 TABLET ORAL at 21:04

## 2018-11-01 RX ADMIN — LORAZEPAM 2 MG: 2 INJECTION INTRAMUSCULAR; INTRAVENOUS at 18:29

## 2018-11-01 RX ADMIN — IBUPROFEN 400 MG: 400 TABLET ORAL at 09:43

## 2018-11-01 RX ADMIN — ACETAMINOPHEN 650 MG: 325 TABLET, FILM COATED ORAL at 16:22

## 2018-11-01 NOTE — BH NOTES
SOCIAL WORK Pt stated she is now giving her , Maria T Vargas permission to visit her on [facility]. Writer relayed message to pt from [facility] for her to call them regarding outpatient counseling and shelter for her and one year old child. Social Work will continue supporting patient towards discharge plan.

## 2018-11-01 NOTE — BH NOTES
GROUP THERAPY PROGRESS NOTE The patient Yudelka sesay 24 y.o. female is participating in [address] [place], [place] [address]. Group time: 45 minutes Personal goal for participation: To participate in self esteem booster Goal orientation:  personal 
 
Group therapy participation: active Therapeutic interventions reviewed and discussed: handout Impression of participation:  The patient was attentive. Demetria Wlof 11/1/2018  5:46 PM

## 2018-11-01 NOTE — PROGRESS NOTES
Problem: Depressed Mood (Adult/Pediatric) Goal: *STG: Participates in treatment plan Outcome: Progressing Towards Goal 
Pt slept 5 hours. No med/beh concern overnight. Pt received tylenol for 4 out 10 headache. Pt reported frustration with roommate but was able to cope with and express feelings appropriately. Staff will continue to monitor for health and safety.
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
## 2018-11-01 NOTE — BH NOTES
GROUP THERAPY PROGRESS NOTE The patient Mitali sesay 24 y.o. female is participating in [employer]. Group time: 1 hour Personal goal for participation: To concentrate on selected task Goal orientation: social 
 
Group therapy participation: active Therapeutic interventions reviewed and discussed: Crafts, games, music Impression of participation: The patient was attentive. Tameka Collazo 11/1/2018  6:09 PM
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
## 2018-11-02 VITALS
RESPIRATION RATE: 18 BRPM | SYSTOLIC BLOOD PRESSURE: 105 MMHG | HEIGHT: 62 IN | BODY MASS INDEX: 21.71 KG/M2 | WEIGHT: 118 LBS | DIASTOLIC BLOOD PRESSURE: 79 MMHG | OXYGEN SATURATION: 100 % | HEART RATE: 120 BPM | TEMPERATURE: 97.4 F

## 2018-11-02 PROCEDURE — 74011250637 HC RX REV CODE- 250/637: Performed by: PSYCHIATRY & NEUROLOGY

## 2018-11-02 PROCEDURE — 93005 ELECTROCARDIOGRAM TRACING: CPT

## 2018-11-02 RX ORDER — LAMOTRIGINE 25 MG/1
75 TABLET ORAL DAILY
Qty: 42 TAB | Refills: 0 | Status: SHIPPED | OUTPATIENT
Start: 2018-11-03 | End: 2018-11-13

## 2018-11-02 RX ORDER — LORAZEPAM 0.5 MG/1
0.5 TABLET ORAL 2 TIMES DAILY
Status: DISCONTINUED | OUTPATIENT
Start: 2018-11-02 | End: 2018-11-02

## 2018-11-02 RX ORDER — ARIPIPRAZOLE 5 MG/1
10 TABLET ORAL DAILY
Status: DISCONTINUED | OUTPATIENT
Start: 2018-11-03 | End: 2018-11-02 | Stop reason: HOSPADM

## 2018-11-02 RX ORDER — GABAPENTIN 400 MG/1
400 CAPSULE ORAL
Qty: 21 CAP | Refills: 0 | Status: SHIPPED | OUTPATIENT
Start: 2018-11-02 | End: 2019-09-30 | Stop reason: ALTCHOICE

## 2018-11-02 RX ORDER — LAMOTRIGINE 25 MG/1
75 TABLET ORAL DAILY
Qty: 14 TAB | Refills: 0 | Status: SHIPPED | OUTPATIENT
Start: 2018-11-03 | End: 2018-11-02

## 2018-11-02 RX ORDER — RISPERIDONE 2 MG/1
2 TABLET, FILM COATED ORAL EVERY 12 HOURS
Qty: 28 TAB | Refills: 0 | Status: SHIPPED | OUTPATIENT
Start: 2018-11-02 | End: 2018-11-13

## 2018-11-02 RX ORDER — ARIPIPRAZOLE 5 MG/1
5 TABLET ORAL DAILY
Qty: 3 TAB | Refills: 0 | Status: SHIPPED | OUTPATIENT
Start: 2018-11-04 | End: 2018-11-13

## 2018-11-02 RX ORDER — ARIPIPRAZOLE 5 MG/1
5 TABLET ORAL DAILY
Status: DISCONTINUED | OUTPATIENT
Start: 2018-11-04 | End: 2018-11-02 | Stop reason: HOSPADM

## 2018-11-02 RX ORDER — LORAZEPAM 0.5 MG/1
0.5 TABLET ORAL 2 TIMES DAILY
Status: DISCONTINUED | OUTPATIENT
Start: 2018-11-02 | End: 2018-11-02 | Stop reason: HOSPADM

## 2018-11-02 RX ORDER — TRAZODONE HYDROCHLORIDE 50 MG/1
50 TABLET ORAL
Qty: 14 TAB | Refills: 0 | Status: SHIPPED | OUTPATIENT
Start: 2018-11-02 | End: 2018-11-13

## 2018-11-02 RX ADMIN — VALACYCLOVIR HYDROCHLORIDE 500 MG: 500 TABLET, FILM COATED ORAL at 08:01

## 2018-11-02 RX ADMIN — LORAZEPAM 0.5 MG: 0.5 TABLET ORAL at 08:01

## 2018-11-02 RX ADMIN — LAMOTRIGINE 75 MG: 25 TABLET ORAL at 08:01

## 2018-11-02 RX ADMIN — GABAPENTIN 400 MG: 300 CAPSULE ORAL at 10:52

## 2018-11-02 RX ADMIN — RISPERIDONE 2 MG: 1 TABLET ORAL at 08:03

## 2018-11-02 RX ADMIN — ARIPIPRAZOLE 10 MG: 5 TABLET ORAL at 08:01

## 2018-11-02 NOTE — BH NOTES
Discharge paperwork reviewed with patient, prescriptions given.  discussed appointments with the patient. All belongings returned and security returned valuables. Escorted patient out and her  was here to take her home. Opportunity to ask questions provided and answered.

## 2018-11-02 NOTE — DISCHARGE INSTRUCTIONS
DISCHARGE SUMMARY from Nurse    PATIENT INSTRUCTIONS:    What to do at Home:  Recommended activity: Activity as tolerated. *  Please give a list of your current medications to your Primary Care Provider. *  Please update this list whenever your medications are discontinued, doses are      changed, or new medications (including over-the-counter products) are added. *  Please carry medication information at all times in case of emergency situations. These are general instructions for a healthy lifestyle:    No smoking/ No tobacco products/ Avoid exposure to second hand smoke  Surgeon General's Warning:  Quitting smoking now greatly reduces serious risk to your health. Obesity, smoking, and sedentary lifestyle greatly increases your risk for illness    A healthy diet, regular physical exercise & weight monitoring are important for maintaining a healthy lifestyle. If I feel I am at risk of hurting myself or others, I will call the crisis office and speak with a crisis worker who will assist me during my crisis. [address]  [phone]  [facility] [place] [address] [phone]  [address]  Crisis-  [address]  [phone]    The discharge information has been reviewed with the patient. The patient verbalized understanding. Discharge medications reviewed with the patient and appropriate educational materials and side effects teaching were provided.

## 2018-11-02 NOTE — BH NOTES
GROUP THERAPY PROGRESS NOTE The patient Meaghan sesay 24 y.o. female is participating in Reflections Group Group time: 30 minutes Personal goal for participation: To discuss the daily goals Goal orientation: personal 
 
Group therapy participation: active Therapeutic interventions reviewed and discussed:  Yes Impression of participation: FARHAN Clemens 11/1/2018  10:13 PM

## 2018-11-02 NOTE — BH NOTES
Pt requested \"something for anxiety. \" Pt reported feeling anxious about an upcoming family meeting. Pt received prn gabapentin po for anxiety. RN will continue to monitor pt's symptoms.

## 2018-11-02 NOTE — BH NOTES
Behavioral Health Transition Record to Provider Patient Name: Chandan Aj YOB: 1995 Medical Record Number: [id number] Date of Admission: 10/22/2018 Date of Discharge: 11/2/2018 Attending Provider: Gely Araiza, * Discharging Provider: Gely Araiza To contact this individual call [phone] and ask the  to page. If unavailable, ask to be transferred to [facility] Provider on call. [facility] Provider will be available on call 24/7 and during holidays. Primary Care Provider: Yvonne Carlson MD 
 
Allergies Allergen Reactions  Latex Hives  Hydrocodone Itching  Monistat 1 (Tioconazole) [Tioconazole] Swelling  Sulfa (Sulfonamide Antibiotics) Hives Reason for Admission: Pt arrived to [facility] after she contacted police reporting she was not well and could no longer care for her 9 month old child. Per TDO, police reported pt was acting paranoid as they got [facility] [employer] [facility] to [facility] once there the [employer] could not provide her with services needed. Pt's  was contacted and there were concerns he would not ensure pt getting to hospital thus ECO implemented. Pt reported to have history of past past suicide attempts with the last being last summer in which she took \"alot of drugs\" in effort to overdose. Admission Diagnosis: Schizophrenia ([address] * No surgery found * Results for orders placed or performed during the hospital encounter of 10/22/18 DRUG SCREEN, URINE Result Value Ref Range AMPHETAMINES NEGATIVE  NEG    
 BARBITURATES NEGATIVE  NEG BENZODIAZEPINES NEGATIVE  NEG    
 COCAINE NEGATIVE  NEG METHADONE NEGATIVE  NEG    
 OPIATES NEGATIVE  NEG    
 PCP(PHENCYCLIDINE) NEGATIVE  NEG    
 THC (TH-CANNABINOL) POSITIVE (A) NEG Drug screen comment (NOTE) CBC WITH AUTOMATED DIFF Result Value Ref Range WBC 11.1 (H) 3.6 - 11.0 K/uL  
 RBC 4.67 3.80 - 5.20 M/uL  HGB 13.0 11.5 - 16.0 g/dL HCT 39.0 35.0 - 47.0 % MCV 83.5 80.0 - 99.0 FL  
 MCH 27.8 26.0 - 34.0 PG  
 MCHC 33.3 30.0 - 36.5 g/dL  
 RDW 14.2 11.5 - 14.5 % PLATELET 302 905 - 592 K/uL MPV 10.3 8.9 - 12.9 FL  
 NRBC 0.0 0  WBC ABSOLUTE NRBC 0.00 0.00 - 0.01 K/uL NEUTROPHILS 83 (H) 32 - 75 % LYMPHOCYTES 13 12 - 49 % MONOCYTES 4 (L) 5 - 13 % EOSINOPHILS 0 0 - 7 % BASOPHILS 0 0 - 1 % IMMATURE GRANULOCYTES 0 0.0 - 0.5 % ABS. NEUTROPHILS 9.2 (H) 1.8 - 8.0 K/UL  
 ABS. LYMPHOCYTES 1.4 0.8 - 3.5 K/UL  
 ABS. MONOCYTES 0.4 0.0 - 1.0 K/UL  
 ABS. EOSINOPHILS 0.0 0.0 - 0.4 K/UL  
 ABS. BASOPHILS 0.0 0.0 - 0.1 K/UL  
 ABS. IMM. GRANS. 0.0 0.00 - 0.04 K/UL  
 DF AUTOMATED    
ETHYL ALCOHOL Result Value Ref Range ALCOHOL(ETHYL),SERUM <10 <66 MG/DL  
METABOLIC PANEL, COMPREHENSIVE Result Value Ref Range Sodium 142 136 - 145 mmol/L Potassium 3.4 (L) 3.5 - 5.1 mmol/L Chloride 106 97 - 108 mmol/L  
 CO2 28 21 - 32 mmol/L Anion gap 8 5 - 15 mmol/L Glucose 99 65 - 100 mg/dL BUN 9 6 - 20 MG/DL Creatinine 0.66 0.55 - 1.02 MG/DL  
 BUN/Creatinine ratio 14 12 - 20 GFR est AA >60 >60 ml/min/1.73m2 GFR est non-AA >60 >60 ml/min/1.73m2 Calcium 8.9 8.5 - 10.1 MG/DL Bilirubin, total 0.3 0.2 - 1.0 MG/DL  
 ALT (SGPT) 17 12 - 78 U/L  
 AST (SGOT) 14 (L) 15 - 37 U/L Alk. phosphatase 76 45 - 117 U/L Protein, total 7.3 6.4 - 8.2 g/dL Albumin 4.1 3.5 - 5.0 g/dL Globulin 3.2 2.0 - 4.0 g/dL A-G Ratio 1.3 1.1 - 2.2 URINALYSIS W/ REFLEX CULTURE Result Value Ref Range Color YELLOW/STRAW Appearance CLOUDY (A) CLEAR Specific gravity 1.025 1.003 - 1.030    
 pH (UA) 6.0 5.0 - 8.0 Protein NEGATIVE  NEG mg/dL Glucose NEGATIVE  NEG mg/dL Ketone NEGATIVE  NEG mg/dL Bilirubin NEGATIVE  NEG Blood MODERATE (A) NEG Urobilinogen 0.2 0.2 - 1.0 EU/dL Nitrites NEGATIVE  NEG  Leukocyte Esterase NEGATIVE  NEG    
 WBC 0-4 0 - 4 /hpf  
 RBC 5-10 0 - 5 /hpf Epithelial cells FEW FEW /lpf Bacteria NEGATIVE  NEG /hpf  
 UA:UC IF INDICATED CULTURE NOT INDICATED BY UA RESULT CNI Mucus 3+ (A) NEG /lpf POTASSIUM Result Value Ref Range Potassium 3.9 3.5 - 5.1 mmol/L  
HEMOGLOBIN A1C WITH EAG Result Value Ref Range Hemoglobin A1c 4.5 4.2 - 6.3 % Est. average glucose Cannot be calculated mg/dL LIPID PANEL Result Value Ref Range LIPID PROFILE Cholesterol, total 122 <200 MG/DL Triglyceride 113 <150 MG/DL  
 HDL Cholesterol 41 MG/DL  
 LDL, calculated 58.4 0 - 100 MG/DL VLDL, calculated 22.6 MG/DL  
 CHOL/HDL Ratio 3.0 0 - 5.0 TSH 3RD GENERATION Result Value Ref Range TSH 0.82 0.36 - 3.74 uIU/mL  
HCG URINE, QL. - POC Result Value Ref Range Pregnancy test,urine (POC) NEGATIVE  NEG    
EKG, 12 LEAD, INITIAL Result Value Ref Range Ventricular Rate 97 BPM  
 Atrial Rate 97 BPM  
 P-R Interval 132 ms QRS Duration 80 ms  
 Q-T Interval 350 ms QTC Calculation (Bezet) 444 ms Calculated P Axis 61 degrees Calculated R Axis 60 degrees Calculated T Axis 54 degrees Diagnosis Normal sinus rhythm Normal ECG When compared with ECG of 05-JAN-2017 00:05, Nonspecific T wave abnormality no longer evident in Anterior leads Immunizations administered during this encounter:  
Immunization History Administered Date(s) Administered  Influenza Vaccine (Quad) PF 10/24/2018  Rho(D) Immune Globulin - IM 09/11/2015, 01/26/2017 Screening for Metabolic Disorders for Patients on Antipsychotic Medications 
(Data obtained from the EMR) Estimated Body Mass Index Estimated body mass index is 21.58 kg/m² as calculated from the following: 
  Height as of this encounter: 5' 2\" (1.575 m). Weight as of this encounter: 53.5 kg (118 lb). Vital Signs/Blood Pressure Visit Vitals /79 Pulse (!) 120 Temp 97.4 °F (36.3 °C) Resp 18 Ht 5' 2\" (1.575 m) Wt 53.5 kg (118 lb) SpO2 100% BMI 21.58 kg/m² Blood Glucose/Hemoglobin A1c Lab Results Component Value Date/Time Glucose 99 10/22/2018 02:30 PM  
 Glucose (POC) 90 2015 04:40 PM  
 
 
Lab Results Component Value Date/Time Hemoglobin A1c 4.5 10/25/2018 04:39 AM  
 
  
Lipid Panel Lab Results Component Value Date/Time Cholesterol, total 122 10/25/2018 04:39 AM  
 HDL Cholesterol 41 10/25/2018 04:39 AM  
 LDL, calculated 58.4 10/25/2018 04:39 AM  
 Triglyceride 113 10/25/2018 04:39 AM  
 CHOL/HDL Ratio 3.0 10/25/2018 04:39 AM  
 
  
Discharge Diagnosis: Please refer to psychiatrist's note. Discharge Plan: Social Work-Pt will be discharged today leaving AMA. Pt was given news during family meeting that her father had  with arrangements being tonight and burial tomorrow. Pt made decision with family to leave AMA and go stay at her mothers home along with her . Pt's mother and  will have someone care for pt's son. Pt is alert and oriented. Pt denies SI/HI. Pt is free of delusions at time of discharge. Pt's mood is labile. Pt's thought process is more organized with titration of medication. Pt's  reported all guns have been removed from the home. Pt provided prescription(s). Pt will leave with her  at discharge and plan is to go stay at her mothers' home. Pt scheduled at [facility] with Dr. Loir Anderson on 2018 @ 2:45pm. Pt is to follow-up with Margarita Conteh LCSW to schedule an established outpatient appointment. Pt referred to [facility] for mental health skill building services. Pt provided with [place] telephone number. Pt is in agreement with the plan. [facility] care plan will be faxed electronically via [address] Lori DODSON#[phone]. Discharge Medication List and Instructions:  
Current Discharge Medication List  
  
START taking these medications Details ARIPiprazole (ABILIFY) 5 mg tablet Take 1 Tab by mouth daily. 3 day taper - do not refill 
Qty: 3 Tab, Refills: 0  
  
gabapentin (NEURONTIN) 400 mg capsule Take 1 Cap by mouth every eight (8) hours as needed (Panic attack / anxiety). Qty: 21 Cap, Refills: 0  
  
risperiDONE (RISPERDAL) 2 mg tablet Take 1 Tab by mouth every twelve (12) hours. Qty: 28 Tab, Refills: 0  
  
traZODone (DESYREL) 50 mg tablet Take 1 Tab by mouth nightly as needed (Insomnia). Qty: 14 Tab, Refills: 0  
  
lamoTRIgine (LAMICTAL) 25 mg tablet Take 3 Tabs by mouth daily. Qty: 42 Tab, Refills: 0 CONTINUE these medications which have NOT CHANGED Details  
valACYclovir (VALTREX) 500 mg tablet Take 500 mg by mouth daily. STOP taking these medications  
  
 ibuprofen (MOTRIN) 600 mg tablet Comments:  
Reason for Stopping:   
   
  
 
 
Unresulted Labs (24h ago, onward) None To obtain results of studies pending at discharge, please contact [phone] Follow-up Information Follow up With Specialties Details Why Contact Info [facility]/Dr. Lynn Antonio  On 11/15/2018 Follow up on 11/15/2018 @ 1:00 with Zoila Sesay MD. *please bring picture id, isurance card and arrive 15 minutes early [address] 
[phone] [employer]/Imelda Valdez LCSW  Schedule an appointment as soon as possible for a visit on 11/1/2018 Follow up by calling Jayy Alejo LCSW to establish outpatient therapy as soon as possible. [address] 
[phone] [facility]  Schedule an appointment as soon as possible for a visit on 11/2/2018 Follow up call by calling [facility] with-in 48 business hours to schedule assessment for outpatient mental health skill building services. [address] [facility], [address], [address] 
[phone]  Dr. Rex Saul  On 11/29/2018 Follow up 11/29/2018 @ 2:45pm with  Lucie Courser for established outpatient psychiatry services. [facility] [address] 
[phone] Kari Fajardo MD Pediatrics, [facility] [address] [facility] 
[phone] [facility]   Follow up , If symptoms worsen contact [facility]. [facility] Crisis! [address] [facility], [facility] 
[phone] *CRISIS [phone] Advanced Directive:  
Does the patient have an appointed surrogate decision maker? Yes Does the patient have a Medical Advance Directive? No 
Does the patient have a Psychiatric Advance Directive? No 
If the patient does not have a surrogate or Medical Advance Directive AND Psychiatric Advance Directive, the patient was offered information on these advance directives Patient will complete at a later time Patient Instructions: Please continue all medications until otherwise directed by physician. Tobacco Cessation Discharge Plan:  
Is the patient a smoker and needs referral for smoking cessation? Not applicable Patient referred to the following for smoking cessation with an appointment? Not applicable Patient was offered medication to assist with smoking cessation at discharge? Not applicable Was education for smoking cessation added to the discharge instructions? Not applicable Alcohol/Substance Abuse Discharge Plan:  
Does the patient have a history of substance/alcohol abuse and requires a referral for treatment? Not applicable Patient referred to the following for substance/alcohol abuse treatment with an appointment? Not applicable Patient was offered medication to assist with alcohol cessation at discharge? Not applicable Was education for substance/alcohol abuse added to discharge instructions? Not applicable Patient discharged to Home; provided to the patient/caregiver either in hard copy or electronically.
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
## 2018-11-02 NOTE — BH NOTES
[id number] Shift 1930: Pt visiting with . Pt requested  be given a copy of her medication list.  
 
2015: Pt informed staff that her  and tx team informed her of a family meeting tomorrow. Pt expressed feeling anxious. 2030: HR elevated. Pt tearful and discussing missing sons birthday at the time of assessment. Will continue to monitor.

## 2018-11-02 NOTE — PROGRESS NOTES
Problem: Depressed Mood (Adult/Pediatric) Goal: *STG: Remains safe in hospital 
Outcome: Progressing Towards Goal 
Pt slept 7 hours overnight. No medical/beh concerns reported or witnessed. Staff will continue to monitor for health and safety.

## 2018-11-02 NOTE — BH NOTES
Social Work Pt was seen in treatment team this morning. Pt is alert and oriented. Pt denies SI/HI. Pt's mood is euthymic, affect is congruent to mood. Pt's thought process is linear and  she reported she has been able to share all her trauma and is doing very well. Plan is to continue cross titration with medication and plan is discharge early next week. Family meeting with pt's  and mother at noon for disclosure of father dying. Pt's insight fair to poor and judgment is fair, reliability is fair. Social work department will continue to coordinate discharge plans.

## 2018-11-03 ENCOUNTER — HOSPITAL ENCOUNTER (INPATIENT)
Age: 23
LOS: 10 days | Discharge: HOME | DRG: 751 | End: 2018-11-13
Attending: EMERGENCY MEDICINE
Payer: MEDICAID

## 2018-11-03 DIAGNOSIS — F33.3 SEVERE RECURRENT DEPRESSION WITH PSYCHOSIS (HCC): Primary | ICD-10-CM

## 2018-11-03 DIAGNOSIS — N39.0 URINARY TRACT INFECTION WITH HEMATURIA, SITE UNSPECIFIED: ICD-10-CM

## 2018-11-03 DIAGNOSIS — R31.9 URINARY TRACT INFECTION WITH HEMATURIA, SITE UNSPECIFIED: ICD-10-CM

## 2018-11-03 PROBLEM — F32.A DEPRESSIVE DISORDER: Status: ACTIVE | Noted: 2018-11-03

## 2018-11-03 LAB
ALBUMIN SERPL-MCNC: 3.8 G/DL (ref 3.5–5)
ALBUMIN/GLOB SERPL: 1.2 {RATIO} (ref 1.1–2.2)
ALP SERPL-CCNC: 60 U/L (ref 45–117)
ALT SERPL-CCNC: 24 U/L (ref 12–78)
AMPHET UR QL SCN: NEGATIVE
ANION GAP SERPL CALC-SCNC: 9 MMOL/L (ref 5–15)
APPEARANCE UR: ABNORMAL
AST SERPL-CCNC: 18 U/L (ref 15–37)
ATRIAL RATE: 97 BPM
BACTERIA URNS QL MICRO: NEGATIVE /HPF
BARBITURATES UR QL SCN: NEGATIVE
BASOPHILS # BLD: 0 K/UL (ref 0–0.1)
BASOPHILS NFR BLD: 0 % (ref 0–1)
BENZODIAZ UR QL: NEGATIVE
BILIRUB SERPL-MCNC: 0.2 MG/DL (ref 0.2–1)
BILIRUB UR QL: NEGATIVE
BUN SERPL-MCNC: 13 MG/DL (ref 6–20)
BUN/CREAT SERPL: 18 (ref 12–20)
CALCIUM SERPL-MCNC: 8.8 MG/DL (ref 8.5–10.1)
CALCULATED P AXIS, ECG09: 61 DEGREES
CALCULATED R AXIS, ECG10: 60 DEGREES
CALCULATED T AXIS, ECG11: 54 DEGREES
CANNABINOIDS UR QL SCN: POSITIVE
CHLORIDE SERPL-SCNC: 106 MMOL/L (ref 97–108)
CO2 SERPL-SCNC: 27 MMOL/L (ref 21–32)
COCAINE UR QL SCN: NEGATIVE
COLOR UR: ABNORMAL
CREAT SERPL-MCNC: 0.74 MG/DL (ref 0.55–1.02)
DIAGNOSIS, 93000: NORMAL
DIFFERENTIAL METHOD BLD: ABNORMAL
DRUG SCRN COMMENT,DRGCM: ABNORMAL
EOSINOPHIL # BLD: 0.1 K/UL (ref 0–0.4)
EOSINOPHIL NFR BLD: 1 % (ref 0–7)
EPITH CASTS URNS QL MICRO: ABNORMAL /LPF
ERYTHROCYTE [DISTWIDTH] IN BLOOD BY AUTOMATED COUNT: 13.6 % (ref 11.5–14.5)
ETHANOL SERPL-MCNC: <10 MG/DL
GLOBULIN SER CALC-MCNC: 3.1 G/DL (ref 2–4)
GLUCOSE SERPL-MCNC: 124 MG/DL (ref 65–100)
GLUCOSE UR STRIP.AUTO-MCNC: NEGATIVE MG/DL
HCT VFR BLD AUTO: 36.4 % (ref 35–47)
HGB BLD-MCNC: 11.8 G/DL (ref 11.5–16)
HGB UR QL STRIP: ABNORMAL
IMM GRANULOCYTES # BLD: 0 K/UL (ref 0–0.04)
IMM GRANULOCYTES NFR BLD AUTO: 0 % (ref 0–0.5)
KETONES UR QL STRIP.AUTO: NEGATIVE MG/DL
LEUKOCYTE ESTERASE UR QL STRIP.AUTO: ABNORMAL
LYMPHOCYTES # BLD: 1.9 K/UL (ref 0.8–3.5)
LYMPHOCYTES NFR BLD: 24 % (ref 12–49)
MCH RBC QN AUTO: 27.6 PG (ref 26–34)
MCHC RBC AUTO-ENTMCNC: 32.4 G/DL (ref 30–36.5)
MCV RBC AUTO: 85.2 FL (ref 80–99)
METHADONE UR QL: NEGATIVE
MONOCYTES # BLD: 0.3 K/UL (ref 0–1)
MONOCYTES NFR BLD: 4 % (ref 5–13)
NEUTS SEG # BLD: 5.7 K/UL (ref 1.8–8)
NEUTS SEG NFR BLD: 71 % (ref 32–75)
NITRITE UR QL STRIP.AUTO: NEGATIVE
NRBC # BLD: 0 K/UL (ref 0–0.01)
NRBC BLD-RTO: 0 PER 100 WBC
OPIATES UR QL: NEGATIVE
P-R INTERVAL, ECG05: 132 MS
PCP UR QL: NEGATIVE
PH UR STRIP: 6.5 [PH] (ref 5–8)
PLATELET # BLD AUTO: 225 K/UL (ref 150–400)
PMV BLD AUTO: 10.6 FL (ref 8.9–12.9)
POTASSIUM SERPL-SCNC: 3.8 MMOL/L (ref 3.5–5.1)
PROT SERPL-MCNC: 6.9 G/DL (ref 6.4–8.2)
PROT UR STRIP-MCNC: NEGATIVE MG/DL
Q-T INTERVAL, ECG07: 350 MS
QRS DURATION, ECG06: 80 MS
QTC CALCULATION (BEZET), ECG08: 444 MS
RBC # BLD AUTO: 4.27 M/UL (ref 3.8–5.2)
RBC #/AREA URNS HPF: ABNORMAL /HPF (ref 0–5)
SODIUM SERPL-SCNC: 142 MMOL/L (ref 136–145)
SP GR UR REFRACTOMETRY: 1.02 (ref 1–1.03)
UA: UC IF INDICATED,UAUC: ABNORMAL
UROBILINOGEN UR QL STRIP.AUTO: 0.2 EU/DL (ref 0.2–1)
VENTRICULAR RATE, ECG03: 97 BPM
WBC # BLD AUTO: 7.9 K/UL (ref 3.6–11)
WBC URNS QL MICRO: ABNORMAL /HPF (ref 0–4)

## 2018-11-03 PROCEDURE — 36415 COLL VENOUS BLD VENIPUNCTURE: CPT | Performed by: NURSE PRACTITIONER

## 2018-11-03 PROCEDURE — 65220000003 HC RM SEMIPRIVATE PSYCH

## 2018-11-03 PROCEDURE — 99284 EMERGENCY DEPT VISIT MOD MDM: CPT

## 2018-11-03 PROCEDURE — 87077 CULTURE AEROBIC IDENTIFY: CPT | Performed by: NURSE PRACTITIONER

## 2018-11-03 PROCEDURE — 87086 URINE CULTURE/COLONY COUNT: CPT | Performed by: NURSE PRACTITIONER

## 2018-11-03 PROCEDURE — 80053 COMPREHEN METABOLIC PANEL: CPT | Performed by: NURSE PRACTITIONER

## 2018-11-03 PROCEDURE — 85025 COMPLETE CBC W/AUTO DIFF WBC: CPT | Performed by: NURSE PRACTITIONER

## 2018-11-03 PROCEDURE — 80307 DRUG TEST PRSMV CHEM ANLYZR: CPT | Performed by: NURSE PRACTITIONER

## 2018-11-03 PROCEDURE — 74011250637 HC RX REV CODE- 250/637: Performed by: NURSE PRACTITIONER

## 2018-11-03 PROCEDURE — 90791 PSYCH DIAGNOSTIC EVALUATION: CPT

## 2018-11-03 PROCEDURE — 81001 URINALYSIS AUTO W/SCOPE: CPT | Performed by: NURSE PRACTITIONER

## 2018-11-03 RX ORDER — CEPHALEXIN 250 MG/1
500 CAPSULE ORAL
Status: COMPLETED | OUTPATIENT
Start: 2018-11-03 | End: 2018-11-03

## 2018-11-03 RX ORDER — ADHESIVE BANDAGE
30 BANDAGE TOPICAL DAILY PRN
Status: DISCONTINUED | OUTPATIENT
Start: 2018-11-03 | End: 2018-11-13 | Stop reason: HOSPADM

## 2018-11-03 RX ORDER — BENZTROPINE MESYLATE 1 MG/ML
2 INJECTION INTRAMUSCULAR; INTRAVENOUS
Status: DISCONTINUED | OUTPATIENT
Start: 2018-11-03 | End: 2018-11-13 | Stop reason: HOSPADM

## 2018-11-03 RX ORDER — LORAZEPAM 2 MG/ML
2 INJECTION INTRAMUSCULAR
Status: DISCONTINUED | OUTPATIENT
Start: 2018-11-03 | End: 2018-11-06

## 2018-11-03 RX ORDER — BENZTROPINE MESYLATE 2 MG/1
2 TABLET ORAL
Status: DISCONTINUED | OUTPATIENT
Start: 2018-11-03 | End: 2018-11-13 | Stop reason: HOSPADM

## 2018-11-03 RX ORDER — ACETAMINOPHEN 500 MG
1000 TABLET ORAL
Status: COMPLETED | OUTPATIENT
Start: 2018-11-03 | End: 2018-11-03

## 2018-11-03 RX ORDER — ZOLPIDEM TARTRATE 5 MG/1
5 TABLET ORAL
Status: DISCONTINUED | OUTPATIENT
Start: 2018-11-03 | End: 2018-11-13 | Stop reason: HOSPADM

## 2018-11-03 RX ORDER — IBUPROFEN 400 MG/1
400 TABLET ORAL
Status: DISCONTINUED | OUTPATIENT
Start: 2018-11-03 | End: 2018-11-07

## 2018-11-03 RX ORDER — OLANZAPINE 5 MG/1
5 TABLET ORAL
Status: DISCONTINUED | OUTPATIENT
Start: 2018-11-03 | End: 2018-11-13 | Stop reason: HOSPADM

## 2018-11-03 RX ORDER — ACETAMINOPHEN 325 MG/1
650 TABLET ORAL
Status: DISCONTINUED | OUTPATIENT
Start: 2018-11-03 | End: 2018-11-13 | Stop reason: HOSPADM

## 2018-11-03 RX ORDER — LORAZEPAM 1 MG/1
1 TABLET ORAL
Status: DISCONTINUED | OUTPATIENT
Start: 2018-11-03 | End: 2018-11-05

## 2018-11-03 RX ORDER — IBUPROFEN 200 MG
1 TABLET ORAL
Status: DISCONTINUED | OUTPATIENT
Start: 2018-11-03 | End: 2018-11-13 | Stop reason: HOSPADM

## 2018-11-03 RX ADMIN — ACETAMINOPHEN 1000 MG: 500 TABLET, FILM COATED ORAL at 18:26

## 2018-11-03 RX ADMIN — CEPHALEXIN 500 MG: 250 CAPSULE ORAL at 21:14
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
## 2018-11-03 NOTE — ED NOTES
Emergency Department Nursing Plan of Care The Nursing Plan of Care is developed from the Nursing assessment and Emergency Department Attending provider initial evaluation. The plan of care may be reviewed in the ED Provider note. The Plan of Care was developed with the following considerations:  
Patient / Family readiness to learn indicated by:verbalized understanding Persons(s) to be included in education: patient Barriers to Learning/Limitations:No 
 
Signed Easton Law RN   
11/3/2018   4:34 PM

## 2018-11-03 NOTE — ED NOTES
Patient states she is here today with c/o a mental breakdown. She was here last weekend as an ECO/TDO in the [facility] and came willingly but [employer] and the crisis felt she was not able to make decisions and they brought her in on a TDO. She states she left to go to her fathers  yesterday and is now having a mental bread down but denies SI or HI.

## 2018-11-03 NOTE — ED NOTES
Bedside and Verbal shift change report given to Ayanna Sims RN (oncoming nurse) by Anjel Pineda RN (offgoing nurse). Report included the following information SBAR and ED Summary.

## 2018-11-04 LAB — HCG UR QL: NEGATIVE

## 2018-11-04 PROCEDURE — 81025 URINE PREGNANCY TEST: CPT

## 2018-11-04 PROCEDURE — 74011250637 HC RX REV CODE- 250/637

## 2018-11-04 PROCEDURE — 65220000003 HC RM SEMIPRIVATE PSYCH

## 2018-11-04 RX ORDER — LAMOTRIGINE 25 MG/1
75 TABLET ORAL DAILY
Status: DISCONTINUED | OUTPATIENT
Start: 2018-11-04 | End: 2018-11-05

## 2018-11-04 RX ORDER — ALBUTEROL SULFATE 90 UG/1
2 AEROSOL, METERED RESPIRATORY (INHALATION)
Status: DISCONTINUED | OUTPATIENT
Start: 2018-11-04 | End: 2018-11-13 | Stop reason: HOSPADM

## 2018-11-04 RX ORDER — VALACYCLOVIR HYDROCHLORIDE 500 MG/1
500 TABLET, FILM COATED ORAL EVERY 12 HOURS
Status: CANCELLED | OUTPATIENT
Start: 2018-11-04

## 2018-11-04 RX ORDER — LAMOTRIGINE 25 MG/1
75 TABLET ORAL DAILY
Status: DISCONTINUED | OUTPATIENT
Start: 2018-11-05 | End: 2018-11-04

## 2018-11-04 RX ORDER — TRAZODONE HYDROCHLORIDE 50 MG/1
50 TABLET ORAL
Status: DISCONTINUED | OUTPATIENT
Start: 2018-11-04 | End: 2018-11-13 | Stop reason: HOSPADM

## 2018-11-04 RX ORDER — RISPERIDONE 1 MG/1
2 TABLET, FILM COATED ORAL 2 TIMES DAILY
Status: DISCONTINUED | OUTPATIENT
Start: 2018-11-04 | End: 2018-11-05

## 2018-11-04 RX ADMIN — GABAPENTIN 400 MG: 300 CAPSULE ORAL at 13:36

## 2018-11-04 RX ADMIN — LAMOTRIGINE 75 MG: 25 TABLET ORAL at 13:40

## 2018-11-04 RX ADMIN — RISPERIDONE 2 MG: 1 TABLET ORAL at 17:11

## 2018-11-04 RX ADMIN — IBUPROFEN 400 MG: 400 TABLET ORAL at 07:53

## 2018-11-04 RX ADMIN — IBUPROFEN 400 MG: 400 TABLET ORAL at 17:13

## 2018-11-04 RX ADMIN — ACETAMINOPHEN 650 MG: 325 TABLET, FILM COATED ORAL at 15:50

## 2018-11-04 RX ADMIN — RISPERIDONE 2 MG: 1 TABLET ORAL at 13:38

## 2018-11-04 RX ADMIN — GABAPENTIN 400 MG: 300 CAPSULE ORAL at 17:11

## 2018-11-04 RX ADMIN — TRAZODONE HYDROCHLORIDE 50 MG: 50 TABLET ORAL at 21:04

## 2018-11-04 RX ADMIN — OLANZAPINE 5 MG: 5 TABLET, FILM COATED ORAL at 14:32

## 2018-11-04 NOTE — BH NOTES
[address] [facility] Nurse Miladis Grimes) called and reported that the patient is still wanting to leave AMA. Nurse contacted Dr. Claudio Donahue earlier to ask for medication that the patient requested. The patient is still requesting to leave AMA at this time. Dr. Claudio Donahue was contacted by Nursing Supervisor, he was provided information about the situation & that the patient is wanting to still leave AMA. He requested that Crisis be contacted. [address] [place] [address] Nurse Miladis Grimes) was notified.

## 2018-11-04 NOTE — PROGRESS NOTES
Results History CULTURE, MRSA (Order [id number]) 4/11/2015  7:42 AM - Jerry, Lab In Sunquest  
 
Specimen Information: Nares  
    
Component Value Flag Ref Range Units Status Special Requests:      Final  
NO SPECIAL REQUESTS Culture result: MRSA NOT PRESENT      Final  
Culture result:      Final  
    Screening of patient nares for MRSA is for surveillance purposes and, if positive, to facilitate isolation considerations in high risk settings. It is not intended for automatic decolonization interventions per se as regimens are not sufficiently effective to warrant routine use.  
  
   
4/10/2015  7:26 PM - Jerry, Lab In Sunquest  
 
Specimen Information: Nares  
    
Component Value Flag Ref Range Units Status Special Requests:      Preliminary NO SPECIAL REQUESTS Culture result: MRSA NOT PRESENT  
AT 12 HOURS      Preliminary

## 2018-11-04 NOTE — BH NOTES
GROUP THERAPY PROGRESS NOTE The patient Woody Ramey is participating in [employer]. Group time: 30 minutes Personal goal for participation: to orient the patient to the unit. Goal orientation: successful adoption of unit rules Group therapy participation: active Therapeutic interventions reviewed and discussed: Yes Impression of participation:  
 
Randall Mcpherson 11/4/2018 8:57 AM

## 2018-11-04 NOTE — BH NOTES
Pt has been observed throughout the night sleeping in bed with even respirations. She did awaken to provide urine for pregnancy test which was negative. Total hours slept approximately  6. No s/s of distress noted. Patient has remained safe. Will continue to monitor.

## 2018-11-04 NOTE — PROGRESS NOTES
Problem: Altered Thought Process (Adult/Pediatric) Goal: *STG: Remains safe in hospital 
Outcome: Progressing Towards Goal 
Pt is alert and oriented. Mood is flat. She has been fine all morning and then during quiet time she decided that she wanted to leave AMA. I talked to her and finally she decided after I had called the doctor 2 times and notified the crisis center that she would stay. She has been quiet the rest of the shift. Staff will continue to monitor pt's safety and offer support as needed. Continue per [facility] protocol.
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
## 2018-11-04 NOTE — BH NOTES
TRANSFER - IN REPORT: 
 
Jalen Chau is being received from ED for routine progression of care Assessment completed upon patients arrival to unit and care assumed. Patient suffered \"breakdown\" after dad's . She stated she has visual hallucinations per report but now denies. Contracts for safety within the hospital and denies SI/HI. She states, \"I don't have anywhere to go. I live here I guess. \" She states, \"I feel disabled. \"  
 
VSS. Safety search completed with CHASTITY Mathew RN revealing skin that is dry and intact . No contraband noted. Scattered tattoos on body. Dr Donta Freitas notified of arrival and orders received. Will monitor with q 15 min checks.
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
## 2018-11-04 NOTE — ED NOTES
TRANSFER - OUT REPORT: 
 
Verbal report given to Quitman Closs RN(name) on Bridger Lugo  being transferred to [facility]) for routine progression of care Report consisted of patients Situation, Background, Assessment and  
Recommendations(SBAR). Information from the following report(s) SBAR, ED Summary, Procedure Summary and MAR was reviewed with the receiving nurse. Lines:    
 
Opportunity for questions and clarification was provided. Patient transported with: 
 security
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
## 2018-11-04 NOTE — ED NOTES
This RN assumes role as preceptor to Suman Nguyen RN. This RN reviews all assessments, charting, medication administration, and skills performed by the preceptee.

## 2018-11-05 PROBLEM — F32.A DEPRESSIVE DISORDER: Status: RESOLVED | Noted: 2018-11-03 | Resolved: 2018-11-05

## 2018-11-05 PROCEDURE — 74011250637 HC RX REV CODE- 250/637

## 2018-11-05 PROCEDURE — 74011250637 HC RX REV CODE- 250/637: Performed by: PSYCHIATRY & NEUROLOGY

## 2018-11-05 PROCEDURE — 65220000003 HC RM SEMIPRIVATE PSYCH

## 2018-11-05 RX ORDER — LAMOTRIGINE 100 MG/1
100 TABLET ORAL DAILY
Status: DISCONTINUED | OUTPATIENT
Start: 2018-11-06 | End: 2018-11-13 | Stop reason: HOSPADM

## 2018-11-05 RX ORDER — CLONAZEPAM 1 MG/1
1 TABLET ORAL EVERY 12 HOURS
Status: DISCONTINUED | OUTPATIENT
Start: 2018-11-05 | End: 2018-11-08

## 2018-11-05 RX ORDER — RISPERIDONE 1 MG/1
2 TABLET, FILM COATED ORAL EVERY 12 HOURS
Status: DISCONTINUED | OUTPATIENT
Start: 2018-11-05 | End: 2018-11-13 | Stop reason: HOSPADM

## 2018-11-05 RX ADMIN — CLONAZEPAM 1 MG: 1 TABLET ORAL at 15:51

## 2018-11-05 RX ADMIN — RISPERIDONE 2 MG: 1 TABLET ORAL at 21:14

## 2018-11-05 RX ADMIN — RISPERIDONE 2 MG: 1 TABLET ORAL at 08:01

## 2018-11-05 RX ADMIN — TRAZODONE HYDROCHLORIDE 50 MG: 50 TABLET ORAL at 21:14

## 2018-11-05 RX ADMIN — LAMOTRIGINE 75 MG: 25 TABLET ORAL at 08:02

## 2018-11-05 RX ADMIN — CLONAZEPAM 1 MG: 1 TABLET ORAL at 21:14

## 2018-11-05 RX ADMIN — GABAPENTIN 400 MG: 300 CAPSULE ORAL at 12:00

## 2018-11-05 RX ADMIN — ACETAMINOPHEN 650 MG: 325 TABLET, FILM COATED ORAL at 12:01

## 2018-11-05 RX ADMIN — OLANZAPINE 5 MG: 5 TABLET, FILM COATED ORAL at 12:36

## 2018-11-05 RX ADMIN — GABAPENTIN 400 MG: 300 CAPSULE ORAL at 08:01

## 2018-11-05 NOTE — PROGRESS NOTES
[facility] Admission Pharmacy Medication Reconciliation Recommendations/Findings:  
1) Patient was just discharged from [facility] on 11/2/18. No changes to medications since discharge. Total Time Spent: 5 minutes Information obtained from: Chart review Patient allergies: Allergies as of 11/03/2018 - Review Complete 11/03/2018 Allergen Reaction Noted  Latex Hives 04/29/2015  Hydrocodone Itching 01/05/2018  Monistat 1 (tioconazole) [tioconazole] Swelling 08/11/2015  Sulfa (sulfonamide antibiotics) Hives 07/19/2012 Prior to Admission Medications Prescriptions Last Dose Informant Patient Reported? Taking? ARIPiprazole (ABILIFY) 5 mg tablet 11/3/2018 at Unknown time  No Yes Sig: Take 1 Tab by mouth daily. 3 day taper - do not refill  
gabapentin (NEURONTIN) 400 mg capsule 11/3/2018 at Unknown time  No Yes Sig: Take 1 Cap by mouth every eight (8) hours as needed (Panic attack / anxiety). lamoTRIgine (LAMICTAL) 25 mg tablet 11/3/2018 at Unknown time  No Yes Sig: Take 3 Tabs by mouth daily. risperiDONE (RISPERDAL) 2 mg tablet 11/3/2018 at Unknown time  No Yes Sig: Take 1 Tab by mouth every twelve (12) hours. traZODone (DESYREL) 50 mg tablet 11/3/2018 at Unknown time  No Yes Sig: Take 1 Tab by mouth nightly as needed (Insomnia). valACYclovir (VALTREX) 500 mg tablet   Yes No  
Sig: Take 500 mg by mouth daily. Facility-Administered Medications: None Thank you, Kristin Boo, PHARMD, BCPS

## 2018-11-05 NOTE — BH NOTES
GROUP THERAPY PROGRESS NOTE The patient Calvin Díaz a 24 y.o. female is participating in [employer]. Group time: 1 hour Personal goal for participation: To concentrate on selected task Goal orientation: social 
 
Group therapy participation: active Therapeutic interventions reviewed and discussed: Crafts, games, music Impression of participation: The patient was attentive. Yessenia Bonilla 11/5/2018  5:47 PM

## 2018-11-05 NOTE — BH NOTES
Patient had no complaints throughout the night, remained in room, monitored q 15minutes for safety, and slept for 6 hours. Patient to nurses station asking if hospital  is able to assist with starting the process of separation from her . Let it be noted that  was on unit visiting with patient on yesterday, visit was uneventful, patient allowed and had no complaint/issues during/after visit. Patient states \"I know! I know! I just keep rolling over! I tell him I want to separate, and then I get weak. I need to stick to what I say! \" Patient education that if she does not want  to call and/or visit, we can allow that. But if she is going to restrict and allow visits for manipulation that it would not be allowed. Patient was asked multiple times if this was the decision she wanted to make, to which she replied \"Yes, I'm sure\". Patient is requesting that  not be allowed to call and/or visit her, or receive any information on her (medical) status. Will continue to monitor for remainder of shift for changes/issues/complaints.
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
## 2018-11-05 NOTE — BH NOTES
GROUP THERAPY PROGRESS NOTE The patient Heidi sesay 24 y.o. female is participating in [employer]. Group time: 45 minutes Personal goal for participation: To participate in happiness game Goal orientation:  personal 
 
Group therapy participation: active Therapeutic interventions reviewed and discussed: life scenarios exploring the pursuit of happiness Impression of participation:  The patient was attentive. Phil Maldonado 11/5/2018  5:22 PM

## 2018-11-05 NOTE — BH NOTES
Social Work Pt was seen in treatment team this morning. Pt is alert and oriented. Pt denies SI/HI. Pt's mood is euthymic, affect is laughing & smiling to flat. Pt's thought process is paranoid, unfocused and is exhibiting racing thoughts. Pt's speech is pressured. Pt discussing STD's and legalities of sharing with others, her fathers' , divorce papers, [facility] worker calling a strange number, etc. DOLLY signed to speak to mother, Jeremie Pérez Je/[phone]. Pt's plan at discharge is to go live with her brother at discharge (DOLLY signed). Pt's child reported to be under the care of pt's mother. Pt's insight poor and judgment is poor, reliability is fair. Writer will update mother and obtain pt's brother's telephone number to confirm discharge plan. Plan is to continue titration of Lamictal.  Social work department will continue to coordinate discharge plans. Pt's brother, Nicole Rivera [phone] provided by pt later in day.
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
## 2018-11-06 LAB
BACTERIA SPEC CULT: ABNORMAL
CC UR VC: ABNORMAL
SERVICE CMNT-IMP: ABNORMAL

## 2018-11-06 PROCEDURE — 74011250637 HC RX REV CODE- 250/637: Performed by: PSYCHIATRY & NEUROLOGY

## 2018-11-06 PROCEDURE — 74011250636 HC RX REV CODE- 250/636

## 2018-11-06 PROCEDURE — 74011250637 HC RX REV CODE- 250/637

## 2018-11-06 PROCEDURE — 65220000003 HC RM SEMIPRIVATE PSYCH

## 2018-11-06 PROCEDURE — 74011250637 HC RX REV CODE- 250/637: Performed by: INTERNAL MEDICINE

## 2018-11-06 RX ORDER — CEPHALEXIN 250 MG/1
500 CAPSULE ORAL EVERY 6 HOURS
Status: DISCONTINUED | OUTPATIENT
Start: 2018-11-06 | End: 2018-11-07

## 2018-11-06 RX ORDER — DIPHENHYDRAMINE HYDROCHLORIDE 50 MG/ML
25 INJECTION, SOLUTION INTRAMUSCULAR; INTRAVENOUS
Status: DISCONTINUED | OUTPATIENT
Start: 2018-11-06 | End: 2018-11-13 | Stop reason: HOSPADM

## 2018-11-06 RX ADMIN — RISPERIDONE 2 MG: 1 TABLET ORAL at 21:57

## 2018-11-06 RX ADMIN — GABAPENTIN 400 MG: 100 CAPSULE ORAL at 10:42

## 2018-11-06 RX ADMIN — RISPERIDONE 2 MG: 1 TABLET ORAL at 08:40

## 2018-11-06 RX ADMIN — CLONAZEPAM 1 MG: 1 TABLET ORAL at 08:40

## 2018-11-06 RX ADMIN — LAMOTRIGINE 100 MG: 100 TABLET ORAL at 08:40

## 2018-11-06 RX ADMIN — CLONAZEPAM 1 MG: 1 TABLET ORAL at 21:57

## 2018-11-06 RX ADMIN — OLANZAPINE 5 MG: 5 TABLET, FILM COATED ORAL at 18:11

## 2018-11-06 RX ADMIN — LORAZEPAM 2 MG: 2 INJECTION INTRAMUSCULAR; INTRAVENOUS at 14:09

## 2018-11-06 RX ADMIN — ALBUTEROL SULFATE 2 PUFF: 90 AEROSOL, METERED RESPIRATORY (INHALATION) at 16:46

## 2018-11-06 RX ADMIN — TRAZODONE HYDROCHLORIDE 50 MG: 50 TABLET ORAL at 21:57

## 2018-11-06 NOTE — BH NOTES
GROUP THERAPY PROGRESS NOTE The patient Mary Kay sesay 24 y.o. female is participating in [address] [place] [address] [employer]. Group time: 45 minutes Personal goal for participation: To participate in chair exercise routine Goal orientation:  personal 
 
Group therapy participation: minimal 
 
Therapeutic interventions reviewed and discussed: benefits of exercise Impression of participation:  The patient was present-arrived late Terrea Slim 11/6/2018  4:53 PM

## 2018-11-06 NOTE — PROGRESS NOTES
Problem: Suicide/Homicide (Adult/Pediatric) Goal: *STG: Remains safe in hospital 
Outcome: Progressing Towards Goal 
Patient alert and oriented. Extremely labile and easily upset. Continues to cling to staff members and appears to have paranoid delusions. Denies SI/HI. Will continue assessments and safety checks.

## 2018-11-06 NOTE — BH NOTES
GROUP THERAPY PROGRESS NOTE The patient Jayshree sesay 24 y.o. female is participating in [employer]. Group time: 1 hour Personal goal for participation: To concentrate on selected task Goal orientation: social 
 
Group therapy participation: active Therapeutic interventions reviewed and discussed: Crafts, games, music Impression of participation: The patient was attentive. Sarthak Wynne 11/6/2018  5:43 PM

## 2018-11-06 NOTE — BH NOTES
GROUP THERAPY PROGRESS NOTE Ivet Lau is participating in [employer]. Group time: 30 minutes Personal goal for participation:  Unit orientation Goal orientation: [place] Group therapy participation: active Therapeutic interventions reviewed and discussed: Yes Impression of participation: good

## 2018-11-06 NOTE — BH NOTES
Patient requested to leave [place] and has a voluntary admission status. Attending psychiatrist notified who stated to call Crisis and have them come evaluate her. Contacted Crisis who stated they will review their charts and will get back to us. Patient notified. 1415- Have not heard back from Crisis as of yet. Patient became tearful and upset. Climbed into the cabinet in her room and curled into the fetal position saying \"being in here makes me feel like I'm being held like a baby and I want that. \" Patient inconsolable. Gabapentin unavailable at this time. 2 mg Ativan IM given for severe anxiety. Patient now resting on her bed.
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
## 2018-11-06 NOTE — BH NOTES
Patient is alert and oriented. Medication and meal compliant. Visible on the unit had no behavioral issues this shift. Denies SI / HI, pain or discomfort. Continue to monitor the patient's status and assess needs. 0530 hrs - Patient was observed to be sleeping for 7 hours without any distress and even respirations.

## 2018-11-07 LAB
ALBUMIN SERPL-MCNC: 3.3 G/DL (ref 3.5–5)
ALBUMIN/GLOB SERPL: 1.1 {RATIO} (ref 1.1–2.2)
ALP SERPL-CCNC: 56 U/L (ref 45–117)
ALT SERPL-CCNC: 21 U/L (ref 12–78)
AMYLASE SERPL-CCNC: 29 U/L (ref 25–115)
ANION GAP SERPL CALC-SCNC: 9 MMOL/L (ref 5–15)
AST SERPL-CCNC: 13 U/L (ref 15–37)
BASOPHILS # BLD: 0.1 K/UL (ref 0–0.1)
BASOPHILS NFR BLD: 1 % (ref 0–1)
BILIRUB SERPL-MCNC: 0.2 MG/DL (ref 0.2–1)
BUN SERPL-MCNC: 17 MG/DL (ref 6–20)
BUN/CREAT SERPL: 28 (ref 12–20)
CALCIUM SERPL-MCNC: 8.4 MG/DL (ref 8.5–10.1)
CHLORIDE SERPL-SCNC: 103 MMOL/L (ref 97–108)
CO2 SERPL-SCNC: 24 MMOL/L (ref 21–32)
CREAT SERPL-MCNC: 0.61 MG/DL (ref 0.55–1.02)
DIFFERENTIAL METHOD BLD: ABNORMAL
EOSINOPHIL # BLD: 0.2 K/UL (ref 0–0.4)
EOSINOPHIL NFR BLD: 2 % (ref 0–7)
ERYTHROCYTE [DISTWIDTH] IN BLOOD BY AUTOMATED COUNT: 13.8 % (ref 11.5–14.5)
GLOBULIN SER CALC-MCNC: 2.9 G/DL (ref 2–4)
GLUCOSE SERPL-MCNC: 93 MG/DL (ref 65–100)
HCT VFR BLD AUTO: 33.2 % (ref 35–47)
HGB BLD-MCNC: 10.9 G/DL (ref 11.5–16)
IMM GRANULOCYTES # BLD: 0 K/UL (ref 0–0.04)
IMM GRANULOCYTES NFR BLD AUTO: 1 % (ref 0–0.5)
LIPASE SERPL-CCNC: 110 U/L (ref 73–393)
LYMPHOCYTES # BLD: 1.4 K/UL (ref 0.8–3.5)
LYMPHOCYTES NFR BLD: 21 % (ref 12–49)
MCH RBC QN AUTO: 27.8 PG (ref 26–34)
MCHC RBC AUTO-ENTMCNC: 32.8 G/DL (ref 30–36.5)
MCV RBC AUTO: 84.7 FL (ref 80–99)
MONOCYTES # BLD: 0.4 K/UL (ref 0–1)
MONOCYTES NFR BLD: 7 % (ref 5–13)
NEUTS SEG # BLD: 4.5 K/UL (ref 1.8–8)
NEUTS SEG NFR BLD: 69 % (ref 32–75)
NRBC # BLD: 0 K/UL (ref 0–0.01)
NRBC BLD-RTO: 0 PER 100 WBC
PLATELET # BLD AUTO: 179 K/UL (ref 150–400)
PMV BLD AUTO: 10.6 FL (ref 8.9–12.9)
POTASSIUM SERPL-SCNC: 3.8 MMOL/L (ref 3.5–5.1)
PROT SERPL-MCNC: 6.2 G/DL (ref 6.4–8.2)
RBC # BLD AUTO: 3.92 M/UL (ref 3.8–5.2)
SODIUM SERPL-SCNC: 136 MMOL/L (ref 136–145)
WBC # BLD AUTO: 6.6 K/UL (ref 3.6–11)

## 2018-11-07 PROCEDURE — 65220000003 HC RM SEMIPRIVATE PSYCH

## 2018-11-07 PROCEDURE — 80053 COMPREHEN METABOLIC PANEL: CPT | Performed by: PSYCHIATRY & NEUROLOGY

## 2018-11-07 PROCEDURE — 82150 ASSAY OF AMYLASE: CPT | Performed by: PSYCHIATRY & NEUROLOGY

## 2018-11-07 PROCEDURE — 74011250637 HC RX REV CODE- 250/637: Performed by: PSYCHIATRY & NEUROLOGY

## 2018-11-07 PROCEDURE — 36415 COLL VENOUS BLD VENIPUNCTURE: CPT | Performed by: PSYCHIATRY & NEUROLOGY

## 2018-11-07 PROCEDURE — 74011250637 HC RX REV CODE- 250/637

## 2018-11-07 PROCEDURE — 83690 ASSAY OF LIPASE: CPT | Performed by: PSYCHIATRY & NEUROLOGY

## 2018-11-07 PROCEDURE — 74011250637 HC RX REV CODE- 250/637: Performed by: INTERNAL MEDICINE

## 2018-11-07 PROCEDURE — 74011000250 HC RX REV CODE- 250

## 2018-11-07 PROCEDURE — 74011250636 HC RX REV CODE- 250/636

## 2018-11-07 PROCEDURE — 85025 COMPLETE CBC W/AUTO DIFF WBC: CPT | Performed by: PSYCHIATRY & NEUROLOGY

## 2018-11-07 RX ORDER — LITHIUM CARBONATE 450 MG/1
450 TABLET ORAL EVERY 12 HOURS
Status: DISCONTINUED | OUTPATIENT
Start: 2018-11-07 | End: 2018-11-13 | Stop reason: HOSPADM

## 2018-11-07 RX ORDER — CEPHALEXIN 250 MG/1
500 CAPSULE ORAL 2 TIMES DAILY
Status: COMPLETED | OUTPATIENT
Start: 2018-11-07 | End: 2018-11-13

## 2018-11-07 RX ADMIN — CEPHALEXIN 500 MG: 250 CAPSULE ORAL at 16:24

## 2018-11-07 RX ADMIN — WATER 20 MG: 1 INJECTION INTRAMUSCULAR; INTRAVENOUS; SUBCUTANEOUS at 16:23

## 2018-11-07 RX ADMIN — LITHIUM CARBONATE 450 MG: 450 TABLET, EXTENDED RELEASE ORAL at 20:37

## 2018-11-07 RX ADMIN — ALBUTEROL SULFATE 2 PUFF: 90 AEROSOL, METERED RESPIRATORY (INHALATION) at 20:40

## 2018-11-07 RX ADMIN — RISPERIDONE 2 MG: 1 TABLET ORAL at 08:09

## 2018-11-07 RX ADMIN — CLONAZEPAM 1 MG: 1 TABLET ORAL at 20:37

## 2018-11-07 RX ADMIN — LAMOTRIGINE 100 MG: 100 TABLET ORAL at 08:10

## 2018-11-07 RX ADMIN — CEPHALEXIN 500 MG: 250 CAPSULE ORAL at 06:03

## 2018-11-07 RX ADMIN — GABAPENTIN 400 MG: 100 CAPSULE ORAL at 13:39

## 2018-11-07 RX ADMIN — RISPERIDONE 2 MG: 1 TABLET ORAL at 20:37

## 2018-11-07 RX ADMIN — LITHIUM CARBONATE 450 MG: 450 TABLET, EXTENDED RELEASE ORAL at 12:49

## 2018-11-07 RX ADMIN — CLONAZEPAM 1 MG: 1 TABLET ORAL at 08:10

## 2018-11-07 RX ADMIN — CEPHALEXIN 500 MG: 250 CAPSULE ORAL at 11:10

## 2018-11-07 RX ADMIN — ACETAMINOPHEN 650 MG: 325 TABLET, FILM COATED ORAL at 20:37

## 2018-11-07 NOTE — PROGRESS NOTES
General Daily Progress Note Admit Date: 11/3/2018 Subjective:  
 
Asked to see patient for RLQ pain. Her RLQ pain radiates to the back. I am concerned about appendicitis. Current Facility-Administered Medications Medication Dose Route Frequency  diphenhydrAMINE (BENADRYL) injection 25 mg  25 mg IntraMUSCular Q4H PRN  
 cephALEXin (KEFLEX) capsule 500 mg  500 mg Oral Q6H  
 lamoTRIgine (LaMICtal) tablet 100 mg  100 mg Oral DAILY  risperiDONE (RisperDAL) tablet 2 mg  2 mg Oral Q12H  clonazePAM (KlonoPIN) tablet 1 mg  1 mg Oral Q12H  
 gabapentin (NEURONTIN) capsule 400 mg  400 mg Oral TID PRN  
 traZODone (DESYREL) tablet 50 mg  50 mg Oral QHS  albuterol (PROVENTIL HFA, VENTOLIN HFA, PROAIR HFA) inhaler 2 Puff  2 Puff Inhalation Q4H PRN  
 ziprasidone (GEODON) 20 mg in sterile water (preservative free) 1 mL injection  20 mg IntraMUSCular BID PRN  
 OLANZapine (ZyPREXA) tablet 5 mg  5 mg Oral Q6H PRN  
 benztropine (COGENTIN) tablet 2 mg  2 mg Oral BID PRN  
 benztropine (COGENTIN) injection 2 mg  2 mg IntraMUSCular BID PRN  
 zolpidem (AMBIEN) tablet 5 mg  5 mg Oral QHS PRN  
 acetaminophen (TYLENOL) tablet 650 mg  650 mg Oral Q4H PRN  
 ibuprofen (MOTRIN) tablet 400 mg  400 mg Oral Q8H PRN  
 magnesium hydroxide (MILK OF MAGNESIA) 400 mg/5 mL oral suspension 30 mL  30 mL Oral DAILY PRN  
 nicotine (NICODERM CQ) 21 mg/24 hr patch 1 Patch  1 Patch TransDERmal DAILY PRN Objective:  
 
No data found. No intake/output data recorded. No intake/output data recorded. Physical Exam:  
Visit Vitals /69 Pulse 77 Temp 97.7 °F (36.5 °C) Resp 16 Ht 5' 2\" (1.575 m) Wt 55.3 kg (122 lb) SpO2 99% BMI 22.31 kg/m² General:  Alert, cooperative, no distress, appears stated age. Head:  Normocephalic, without obvious abnormality, atraumatic. Eyes:  Conjunctivae/corneas clear. PERRL, EOMs intact. Lungs:   Clear to auscultation bilaterally.   
Chest wall:  No tenderness or deformity. Heart:  Regular rate and rhythm, S1, S2 normal, no murmur, click, rub or gallop. Abdomen:   Tender RLQ. Extremities: Extremities normal, atraumatic, no cyanosis or edema. Pulses:   
Skin: Skin color, texture, turgor normal. No rashes or lesions No results found for this or any previous visit (from the past 24 hour(s)). Assessment:  
 
Principal Problem: 
  Depressive psychosis ([facility] [address] (10/22/2018) RLQ pain may be related to UTI but need to rule out appendicitis. Plan:  
 
CT of abdomen with PO contrast. 
 
Amylase, Lipase, CMP, CBC. Keflex 500mg PO qid x 7 days.

## 2018-11-07 NOTE — BH NOTES
GROUP THERAPY PROGRESS NOTE The patient Gregor Balderas a 24 y.o. female is participating in [employer]. Group time: 1 hour Personal goal for participation: To concentrate on selected task Goal orientation: social 
 
Group therapy participation: active Therapeutic interventions reviewed and discussed: Crafts, games, music Impression of participation: The patient was attentive. Claire Cuellar 11/7/2018  5:32 PM
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
## 2018-11-07 NOTE — BH NOTES
Remained in bed resting quietly x 5.5hrs , monitored q15 minutes, Pt had lab work done tolerated well

## 2018-11-07 NOTE — PROGRESS NOTES
Problem: Altered Thought Process (Adult/Pediatric) Goal: *STG: Seeks staff when feelings of anxiety and fear arise Outcome: Progressing Towards Goal 
Pt verbalizes understanding of education and agrees to contact her RN as needed. Pt progressing towards care plan goals.

## 2018-11-07 NOTE — BH NOTES
GROUP THERAPY PROGRESS NOTE The patient Gregor sesay 24 y.o. female is participating in [facility] [employer]. Group time: 45 minutes Personal goal for participation: To participate in stress management [employer] game Goal orientation:  personal 
 
Group therapy participation: active Therapeutic interventions reviewed and discussed: things pertaining to stress Impression of participation:  The patient was attentive. Claire Cuellar 11/7/2018  2:06 PM

## 2018-11-07 NOTE — PROGRESS NOTES
Problem: Altered Thought Process (Adult/Pediatric) Goal: *STG: Remains safe in hospital 
Client has stayed to her self most of this pm.  Client meal and med compliant, did attend group and snack. Q 15 min checks for safety continue.

## 2018-11-07 NOTE — PROGRESS NOTES
Patient actively  participated in Spirituality group on Acute U on 11/7/18. SEYMOUR Tyler, [facility],  [facility]  Paging Service  [phone])
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
## 2018-11-07 NOTE — PROGRESS NOTES
General Daily Progress Note Admit Date: 11/3/2018 Subjective: F/u visit. This is regarding RLQ pain and CT. But CT was not done. Pt pain is better. No Fever or chills. No nausea or vomiting. Current Facility-Administered Medications Medication Dose Route Frequency  lithium carbonate CR (ESKALITH CR) tablet 450 mg  450 mg Oral Q12H  cephALEXin (KEFLEX) capsule 500 mg  500 mg Oral BID  diphenhydrAMINE (BENADRYL) injection 25 mg  25 mg IntraMUSCular Q4H PRN  
 lamoTRIgine (LaMICtal) tablet 100 mg  100 mg Oral DAILY  risperiDONE (RisperDAL) tablet 2 mg  2 mg Oral Q12H  clonazePAM (KlonoPIN) tablet 1 mg  1 mg Oral Q12H  
 gabapentin (NEURONTIN) capsule 400 mg  400 mg Oral TID PRN  
 traZODone (DESYREL) tablet 50 mg  50 mg Oral QHS  albuterol (PROVENTIL HFA, VENTOLIN HFA, PROAIR HFA) inhaler 2 Puff  2 Puff Inhalation Q4H PRN  
 ziprasidone (GEODON) 20 mg in sterile water (preservative free) 1 mL injection  20 mg IntraMUSCular BID PRN  
 OLANZapine (ZyPREXA) tablet 5 mg  5 mg Oral Q6H PRN  
 benztropine (COGENTIN) tablet 2 mg  2 mg Oral BID PRN  
 benztropine (COGENTIN) injection 2 mg  2 mg IntraMUSCular BID PRN  
 zolpidem (AMBIEN) tablet 5 mg  5 mg Oral QHS PRN  
 acetaminophen (TYLENOL) tablet 650 mg  650 mg Oral Q4H PRN  
 magnesium hydroxide (MILK OF MAGNESIA) 400 mg/5 mL oral suspension 30 mL  30 mL Oral DAILY PRN  
 nicotine (NICODERM CQ) 21 mg/24 hr patch 1 Patch  1 Patch TransDERmal DAILY PRN Objective:  
 
No data found. No intake/output data recorded. No intake/output data recorded. Physical Exam:  
Visit Vitals /78 (BP 1 Location: Right arm, BP Patient Position: Sitting) Pulse 87 Temp 97.3 °F (36.3 °C) Resp 16 Ht 5' 2\" (1.575 m) Wt 55.3 kg (122 lb) SpO2 99% BMI 22.31 kg/m² General:  Alert, cooperative, no distress, appears stated age. Head:  Normocephalic, without obvious abnormality, atraumatic.   
Eyes: Conjunctivae/corneas clear. PERRL, EOMs intact. Lungs:   Clear to auscultation bilaterally. Chest wall:  No tenderness or deformity. Heart:  Regular rate and rhythm, S1, S2 normal, no murmur, click, rub or gallop. Abdomen:   Decrease tenderness RLQ. Extremities: Extremities normal, atraumatic, no cyanosis or edema. Pulses:   
Skin: Skin color, texture, turgor normal. No rashes or lesions Recent Results (from the past 24 hour(s)) AMYLASE Collection Time: 11/07/18  4:36 AM  
Result Value Ref Range Amylase 29 25 - 115 U/L  
CBC WITH AUTOMATED DIFF Collection Time: 11/07/18  4:36 AM  
Result Value Ref Range WBC 6.6 3.6 - 11.0 K/uL  
 RBC 3.92 3.80 - 5.20 M/uL  
 HGB 10.9 (L) 11.5 - 16.0 g/dL HCT 33.2 (L) 35.0 - 47.0 % MCV 84.7 80.0 - 99.0 FL  
 MCH 27.8 26.0 - 34.0 PG  
 MCHC 32.8 30.0 - 36.5 g/dL  
 RDW 13.8 11.5 - 14.5 % PLATELET 777 250 - 726 K/uL MPV 10.6 8.9 - 12.9 FL  
 NRBC 0.0 0  WBC ABSOLUTE NRBC 0.00 0.00 - 0.01 K/uL NEUTROPHILS 69 32 - 75 % LYMPHOCYTES 21 12 - 49 % MONOCYTES 7 5 - 13 % EOSINOPHILS 2 0 - 7 % BASOPHILS 1 0 - 1 % IMMATURE GRANULOCYTES 1 (H) 0.0 - 0.5 % ABS. NEUTROPHILS 4.5 1.8 - 8.0 K/UL  
 ABS. LYMPHOCYTES 1.4 0.8 - 3.5 K/UL  
 ABS. MONOCYTES 0.4 0.0 - 1.0 K/UL  
 ABS. EOSINOPHILS 0.2 0.0 - 0.4 K/UL  
 ABS. BASOPHILS 0.1 0.0 - 0.1 K/UL  
 ABS. IMM. GRANS. 0.0 0.00 - 0.04 K/UL  
 DF AUTOMATED    
LIPASE Collection Time: 11/07/18  4:36 AM  
Result Value Ref Range Lipase 110 73 - 898 U/L  
METABOLIC PANEL, COMPREHENSIVE Collection Time: 11/07/18  4:36 AM  
Result Value Ref Range Sodium 136 136 - 145 mmol/L Potassium 3.8 3.5 - 5.1 mmol/L Chloride 103 97 - 108 mmol/L  
 CO2 24 21 - 32 mmol/L Anion gap 9 5 - 15 mmol/L Glucose 93 65 - 100 mg/dL BUN 17 6 - 20 MG/DL Creatinine 0.61 0.55 - 1.02 MG/DL  
 BUN/Creatinine ratio 28 (H) 12 - 20  GFR est AA >60 >60 ml/min/1.73m2 GFR est non-AA >60 >60 ml/min/1.73m2 Calcium 8.4 (L) 8.5 - 10.1 MG/DL Bilirubin, total 0.2 0.2 - 1.0 MG/DL  
 ALT (SGPT) 21 12 - 78 U/L  
 AST (SGOT) 13 (L) 15 - 37 U/L Alk. phosphatase 56 45 - 117 U/L Protein, total 6.2 (L) 6.4 - 8.2 g/dL Albumin 3.3 (L) 3.5 - 5.0 g/dL Globulin 2.9 2.0 - 4.0 g/dL A-G Ratio 1.1 1.1 - 2.2 Assessment:  
 
Principal Problem: 
  Depressive psychosis ([address] (10/22/2018) RLQ pain likely related to UTI Plan:  
 
Finish 7 day course of Keflex.

## 2018-11-08 PROCEDURE — 65220000003 HC RM SEMIPRIVATE PSYCH

## 2018-11-08 PROCEDURE — 74011250637 HC RX REV CODE- 250/637: Performed by: PSYCHIATRY & NEUROLOGY

## 2018-11-08 PROCEDURE — 74011250637 HC RX REV CODE- 250/637

## 2018-11-08 RX ORDER — CLONAZEPAM 1 MG/1
1 TABLET ORAL 2 TIMES DAILY
Status: DISCONTINUED | OUTPATIENT
Start: 2018-11-08 | End: 2018-11-10

## 2018-11-08 RX ADMIN — ZOLPIDEM TARTRATE 5 MG: 5 TABLET ORAL at 03:21

## 2018-11-08 RX ADMIN — ACETAMINOPHEN 650 MG: 325 TABLET, FILM COATED ORAL at 03:20

## 2018-11-08 RX ADMIN — ALBUTEROL SULFATE 2 PUFF: 90 AEROSOL, METERED RESPIRATORY (INHALATION) at 21:09

## 2018-11-08 RX ADMIN — TRAZODONE HYDROCHLORIDE 50 MG: 50 TABLET ORAL at 21:08

## 2018-11-08 RX ADMIN — LITHIUM CARBONATE 450 MG: 450 TABLET, EXTENDED RELEASE ORAL at 08:11

## 2018-11-08 RX ADMIN — CLONAZEPAM 1 MG: 1 TABLET ORAL at 16:11

## 2018-11-08 RX ADMIN — ALBUTEROL SULFATE 2 PUFF: 90 AEROSOL, METERED RESPIRATORY (INHALATION) at 09:01

## 2018-11-08 RX ADMIN — LITHIUM CARBONATE 450 MG: 450 TABLET, EXTENDED RELEASE ORAL at 21:08

## 2018-11-08 RX ADMIN — LAMOTRIGINE 100 MG: 100 TABLET ORAL at 08:11

## 2018-11-08 RX ADMIN — CLONAZEPAM 1 MG: 1 TABLET ORAL at 08:11

## 2018-11-08 RX ADMIN — CEPHALEXIN 500 MG: 250 CAPSULE ORAL at 16:13

## 2018-11-08 RX ADMIN — RISPERIDONE 2 MG: 1 TABLET ORAL at 08:11

## 2018-11-08 RX ADMIN — RISPERIDONE 2 MG: 1 TABLET ORAL at 21:08

## 2018-11-08 RX ADMIN — GABAPENTIN 400 MG: 100 CAPSULE ORAL at 19:00

## 2018-11-08 RX ADMIN — ACETAMINOPHEN 650 MG: 325 TABLET, FILM COATED ORAL at 10:26

## 2018-11-08 RX ADMIN — CEPHALEXIN 500 MG: 250 CAPSULE ORAL at 08:11

## 2018-11-08 NOTE — BH NOTES
GROUP THERAPY PROGRESS NOTE The patient Nuha Lopez a 24 y.o. female is participating in [facility]. Group time: 45 minutes Personal goal for participation: To participate in coping skills memory game Goal orientation:  personal 
 
Group therapy participation: active Therapeutic interventions reviewed and discussed: positive coping strategies a-z Impression of participation:  The patient was attentive. Holly Kennedy 11/8/2018  5:28 PM

## 2018-11-08 NOTE — BH NOTES
SOCIAL WORK Pt requested to see pt after treatment team later in afternoon in which she requested to be discharged so she could go to her father's isbell ceremony. Writer explained the process of her voluntary commitment and the importance of her treatment in which pt was unable to grasp or even stay focused upon. Pt inquired about patient advocate in which an email was sent to patient advocate, Durga Huber notifying him of request. Pt's mother updated on pt and advised against discharge for Isbell and mother shared there is no isbell and that is something pt wants to arrange and do. Pt's  called for first time during this admission advising that his wife wanted to be discharged. Writer explained civil commitment process to Mr. Bianka Jaffe again and advised him to review paper work he was provided with by writer last week in which he was provided for similar situation. Mr. Bianka Jaffe reported pt feels uncomfortable with two other pt's on the unit and he described them as being [place]. \" Writer assured Mr. Garibay that pt is safe and under supervision by nurses, techs and staff. Mr. Bianka Jaffe requested that advocate be contacted about issue pt had with third shift nurses in which writer assured pt's  that a request for pt advocate had been made earlier in day asking that pt be seen. Mr. Bianka Jaffe asked if he could have medical director's telephone number and writer assured him I would provide his number ([phone]) to pt's psychiatrist with message for medical director which was done. Pt's  hung up on writer. Plan is to continue supporting pt towards discharge.

## 2018-11-08 NOTE — BH NOTES
Initial round note PT observed sleeping , asymptomatic, no s/s of distress  Pt continued on Q15 min checks

## 2018-11-08 NOTE — BH NOTES
GROUP THERAPY PROGRESS NOTE Jayshree Hayes is participating in [employer] time: 30 mins Personal goal for participation:  
 
Goal orientation: community Group therapy participation: unit Therapeutic interventions reviewed and discussed: yes Impression of participation: good

## 2018-11-08 NOTE — PROGRESS NOTES
Problem: Altered Thought Process (Adult/Pediatric) Goal: *STG: Participates in treatment plan Outcome: Progressing Towards Goal 
Pt is labile. tearful at times. Attending groups. Medication and meal complaint. Disorganized in thoughts. Will continue to monitor patient and assess needs.

## 2018-11-08 NOTE — BH NOTES
GROUP THERAPY PROGRESS NOTE The patient Edel Mcgraw a 24 y.o. female is participating in [employer]. Group time: 1 hour Personal goal for participation: To concentrate on selected task Goal orientation: social 
 
Group therapy participation: minimal 
 
Therapeutic interventions reviewed and discussed: Crafts, games, music Impression of participation: The patient was present-arrived late Nida Gilbert 11/8/2018  5:47 PM
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
## 2018-11-09 PROCEDURE — 74011250637 HC RX REV CODE- 250/637

## 2018-11-09 PROCEDURE — 65220000003 HC RM SEMIPRIVATE PSYCH

## 2018-11-09 PROCEDURE — 74011250637 HC RX REV CODE- 250/637: Performed by: PSYCHIATRY & NEUROLOGY

## 2018-11-09 PROCEDURE — 74011000250 HC RX REV CODE- 250: Performed by: PSYCHIATRY & NEUROLOGY

## 2018-11-09 RX ORDER — LIDOCAINE HYDROCHLORIDE 20 MG/ML
15 SOLUTION OROPHARYNGEAL AS NEEDED
Status: DISCONTINUED | OUTPATIENT
Start: 2018-11-09 | End: 2018-11-13 | Stop reason: HOSPADM

## 2018-11-09 RX ADMIN — ALBUTEROL SULFATE 2 PUFF: 90 AEROSOL, METERED RESPIRATORY (INHALATION) at 16:21

## 2018-11-09 RX ADMIN — LITHIUM CARBONATE 450 MG: 450 TABLET, EXTENDED RELEASE ORAL at 08:07

## 2018-11-09 RX ADMIN — GABAPENTIN 400 MG: 100 CAPSULE ORAL at 12:18

## 2018-11-09 RX ADMIN — ACETAMINOPHEN 650 MG: 325 TABLET, FILM COATED ORAL at 11:25

## 2018-11-09 RX ADMIN — ALBUTEROL SULFATE 2 PUFF: 90 AEROSOL, METERED RESPIRATORY (INHALATION) at 08:07

## 2018-11-09 RX ADMIN — CLONAZEPAM 1 MG: 1 TABLET ORAL at 16:22

## 2018-11-09 RX ADMIN — LITHIUM CARBONATE 450 MG: 450 TABLET, EXTENDED RELEASE ORAL at 21:08

## 2018-11-09 RX ADMIN — LAMOTRIGINE 100 MG: 100 TABLET ORAL at 08:07

## 2018-11-09 RX ADMIN — CEPHALEXIN 500 MG: 250 CAPSULE ORAL at 16:22

## 2018-11-09 RX ADMIN — RISPERIDONE 2 MG: 1 TABLET ORAL at 08:07

## 2018-11-09 RX ADMIN — LIDOCAINE HYDROCHLORIDE 15 ML: 20 SOLUTION ORAL; TOPICAL at 16:21

## 2018-11-09 RX ADMIN — CLONAZEPAM 1 MG: 1 TABLET ORAL at 08:07

## 2018-11-09 RX ADMIN — CEPHALEXIN 500 MG: 250 CAPSULE ORAL at 08:07

## 2018-11-09 RX ADMIN — TRAZODONE HYDROCHLORIDE 50 MG: 50 TABLET ORAL at 21:08

## 2018-11-09 RX ADMIN — RISPERIDONE 2 MG: 1 TABLET ORAL at 21:08

## 2018-11-09 RX ADMIN — BENZTROPINE MESYLATE 2 MG: 2 TABLET ORAL at 22:27

## 2018-11-09 NOTE — BH NOTES
Social Work Pt was seen in treatment team this morning. Pt is alert and oriented. Pt denies SI/HI. Pt's mood is labile, affect is congruent to mood. Pt's thought process is improving but pt still presenting with tangential thoughts, flight of ideas and impaired concentration. Pt's insight poor and judgment poor, reliability is poor. Pt''s mother updated. Pt continues to change mind about discharge plans and follow-up care. Pt shared she retracted her letter of request to discharge from voluntary commitment. Social work department will continue to coordinate discharge plans. Addendum: Charge nurse brought list of issues pt documented that she would like to discuss with patient advocate. Writer shared email sent yesterday to patient advocate/Willard Barnes reporting pt's request to be seen.

## 2018-11-09 NOTE — PROGRESS NOTES
Problem: Suicide/Homicide (Adult/Pediatric) Goal: *STG: Remains safe in hospital 
Outcome: Progressing Towards Goal 
Patient alert and oriented. Compliant with meals and medications. Denies SI/HI. Patient remains labile and easily upset. No behavioral issues at this time. Will continue assessments and safety checks.

## 2018-11-09 NOTE — BH NOTES
Initial shift rounds PT observed sleeping , asymptomatic, no s/s of distress  Pt continued on Q15 min checks

## 2018-11-09 NOTE — BH NOTES
GROUP THERAPY PROGRESS NOTE The patient Traci Adams a 24 y.o. female is participating in [employer]. Group time: 1 hour Personal goal for participation: To concentrate on selected task Goal orientation: social 
 
Group therapy participation: active Therapeutic interventions reviewed and discussed: Crafts, games, music Impression of participation: The patient was attentive. Aura Skinner 11/9/2018  5:29 PM

## 2018-11-09 NOTE — BH NOTES
Pt rested quietly in bed with eyes closed. No signs/symptoms of distress, agitation, or anxiety. Will monitor for changes. Q 15 minute checks continue. PT slept 6.5 hrs
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
## 2018-11-10 PROCEDURE — 74011250637 HC RX REV CODE- 250/637: Performed by: PSYCHIATRY & NEUROLOGY

## 2018-11-10 PROCEDURE — 74011250637 HC RX REV CODE- 250/637

## 2018-11-10 PROCEDURE — 74011000250 HC RX REV CODE- 250: Performed by: PSYCHIATRY & NEUROLOGY

## 2018-11-10 PROCEDURE — 65220000003 HC RM SEMIPRIVATE PSYCH

## 2018-11-10 RX ORDER — CLONAZEPAM 0.5 MG/1
0.5 TABLET ORAL EVERY 12 HOURS
Status: DISCONTINUED | OUTPATIENT
Start: 2018-11-10 | End: 2018-11-10

## 2018-11-10 RX ORDER — CLONAZEPAM 0.5 MG/1
0.5 TABLET ORAL EVERY 24 HOURS
Status: DISCONTINUED | OUTPATIENT
Start: 2018-11-11 | End: 2018-11-12

## 2018-11-10 RX ORDER — CLONAZEPAM 0.5 MG/1
0.5 TABLET ORAL EVERY 12 HOURS
Status: DISCONTINUED | OUTPATIENT
Start: 2018-11-11 | End: 2018-11-13 | Stop reason: HOSPADM

## 2018-11-10 RX ORDER — CLONAZEPAM 1 MG/1
1 TABLET ORAL ONCE
Status: COMPLETED | OUTPATIENT
Start: 2018-11-10 | End: 2018-11-10

## 2018-11-10 RX ADMIN — LIDOCAINE HYDROCHLORIDE 15 ML: 20 SOLUTION ORAL; TOPICAL at 15:02

## 2018-11-10 RX ADMIN — CEPHALEXIN 500 MG: 250 CAPSULE ORAL at 16:42

## 2018-11-10 RX ADMIN — ZOLPIDEM TARTRATE 5 MG: 5 TABLET ORAL at 23:59

## 2018-11-10 RX ADMIN — LITHIUM CARBONATE 450 MG: 450 TABLET, EXTENDED RELEASE ORAL at 21:17

## 2018-11-10 RX ADMIN — LITHIUM CARBONATE 450 MG: 450 TABLET, EXTENDED RELEASE ORAL at 08:26

## 2018-11-10 RX ADMIN — CEPHALEXIN 500 MG: 250 CAPSULE ORAL at 08:27

## 2018-11-10 RX ADMIN — CLONAZEPAM 1 MG: 1 TABLET ORAL at 15:03

## 2018-11-10 RX ADMIN — RISPERIDONE 2 MG: 1 TABLET ORAL at 08:26

## 2018-11-10 RX ADMIN — CLONAZEPAM 1 MG: 1 TABLET ORAL at 08:27

## 2018-11-10 RX ADMIN — LIDOCAINE HYDROCHLORIDE 15 ML: 20 SOLUTION ORAL; TOPICAL at 06:44

## 2018-11-10 RX ADMIN — LAMOTRIGINE 100 MG: 100 TABLET ORAL at 08:26

## 2018-11-10 RX ADMIN — RISPERIDONE 2 MG: 1 TABLET ORAL at 21:16

## 2018-11-10 RX ADMIN — TRAZODONE HYDROCHLORIDE 50 MG: 50 TABLET ORAL at 21:17

## 2018-11-10 NOTE — BH NOTES
Remained in bed resting quietly. Patient slept 7 hours this shift. Staff will continue to monitor q15 minutes for safety.

## 2018-11-10 NOTE — PROGRESS NOTES
Problem: Suicide/Homicide (Adult/Pediatric) Goal: *STG: Remains safe in hospital 
Outcome: Progressing Towards Goal 
Pt is alert and oriented X4. She is still confused about what she wants. She asked the doctor to adjust her medication today. Med and meal compliant. Staff will continue to monitor pt's safety and offer support as needed. Continue per [facility] protocol.
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
## 2018-11-10 NOTE — PROGRESS NOTES
Spiritual Care Assessment/Progress Note [facility] 
 
 
NAME: Agapito Cadet      MRN: [id number] AGE: 24 y.o. SEX: female Oriental orthodox Affiliation: Jehovah's witness  
Language: English  
 
11/10/2018     Total Time (in minutes): 6 Spiritual Assessment begun in [place] [id number] through conversation with: 
  
    [x]Patient        [] Family    [] Friend(s) Reason for Consult: Request by patient Spiritual beliefs: (Please include comment if needed) [x] Identifies with a horace tradition:   Jehovah's witness  
   [] Supported by a horace community:        
   [] Claims no spiritual orientation:       
   [] Seeking spiritual identity:            
   [] Adheres to an individual form of spirituality:       
   [] Not able to assess:                   
 
    
Identified resources for coping:  
   [] Prayer                           
   [] Music                  [] Guided Imagery 
   [] Family/friends                 [] Pet visits [] Devotional reading                         [x] Unknown 
   [] Other:                                         
 
 
Interventions offered during this visit: (See comments for more details) Patient Interventions: Initial visit Plan of Care: 
 
 [x] Support spiritual and/or cultural needs  
 [] Support AMD and/or advance care planning process    
 [] Support grieving process 
 [] Coordinate Rites and/or Rituals  
 [] Coordination with community clergy [] No spiritual needs identified at this time 
 [] Detailed Plan of Care below (See Comments)  [] Make referral to Music Therapy 
[] Make referral to Pet Therapy    
[] Make referral to Addiction services 
[] Make referral to [facility] 
[] Make referral to Spiritual Care Partner 
[] No future visits requested       
[] Follow up visits as needed Comments:  
 
Patient asked nurse to contact a  for a routine visit, wished to speak to a .    was contacted and patient stated that the visit was non-urgent and that she would like to meet with a  during the day on Sunday if possible. Nurse was instructed to contact the  should the patient change her mind or should desire to speak to a  this evening. Will continue to follow up as needed and upon request. 
 
Visited by Rev. Brandi Potts, [address] [place], [place] [address] [facility]  paging service: [phone])

## 2018-11-11 LAB
DATE LAST DOSE: NORMAL
LITHIUM SERPL-SCNC: 0.75 MMOL/L (ref 0.6–1.2)
REPORTED DOSE,DOSE: NORMAL UNITS
REPORTED DOSE/TIME,TMG: NORMAL

## 2018-11-11 PROCEDURE — 80178 ASSAY OF LITHIUM: CPT

## 2018-11-11 PROCEDURE — 74011250637 HC RX REV CODE- 250/637: Performed by: PSYCHIATRY & NEUROLOGY

## 2018-11-11 PROCEDURE — 65220000003 HC RM SEMIPRIVATE PSYCH

## 2018-11-11 PROCEDURE — 74011250637 HC RX REV CODE- 250/637

## 2018-11-11 PROCEDURE — 36415 COLL VENOUS BLD VENIPUNCTURE: CPT

## 2018-11-11 RX ADMIN — CEPHALEXIN 500 MG: 250 CAPSULE ORAL at 16:06

## 2018-11-11 RX ADMIN — LITHIUM CARBONATE 450 MG: 450 TABLET, EXTENDED RELEASE ORAL at 08:06

## 2018-11-11 RX ADMIN — LAMOTRIGINE 100 MG: 100 TABLET ORAL at 08:07

## 2018-11-11 RX ADMIN — ACETAMINOPHEN 650 MG: 325 TABLET, FILM COATED ORAL at 21:00

## 2018-11-11 RX ADMIN — RISPERIDONE 2 MG: 1 TABLET ORAL at 21:00

## 2018-11-11 RX ADMIN — TRAZODONE HYDROCHLORIDE 50 MG: 50 TABLET ORAL at 21:00

## 2018-11-11 RX ADMIN — CLONAZEPAM 0.5 MG: 0.5 TABLET ORAL at 21:00

## 2018-11-11 RX ADMIN — CLONAZEPAM 0.5 MG: 0.5 TABLET ORAL at 12:00

## 2018-11-11 RX ADMIN — RISPERIDONE 2 MG: 1 TABLET ORAL at 08:06

## 2018-11-11 RX ADMIN — LITHIUM CARBONATE 450 MG: 450 TABLET, EXTENDED RELEASE ORAL at 21:00

## 2018-11-11 RX ADMIN — CEPHALEXIN 500 MG: 250 CAPSULE ORAL at 08:06

## 2018-11-11 RX ADMIN — CLONAZEPAM 0.5 MG: 0.5 TABLET ORAL at 08:07

## 2018-11-11 RX ADMIN — ALBUTEROL SULFATE 2 PUFF: 90 AEROSOL, METERED RESPIRATORY (INHALATION) at 08:08

## 2018-11-11 NOTE — PROGRESS NOTES
Diet as tolerated. Pt noted to be meal compliant. Hx unremarkable per nutrition ex when pt was pregnant. Ht: 5'2\" Wt: 122 lb BMI 22.31 kg/(m^2) c/w normal weight Est energy needs; 1675 kcal, 64 g protein, 1 mL/kcal fluids Pt will consume > 75% of meals at follow up

## 2018-11-11 NOTE — PROGRESS NOTES
Problem: Altered Thought Process (Adult/Pediatric) Goal: *STG: Remains safe in hospital 
Outcome: Progressing Towards Goal 
Pt is alert and oriented X3. Med and meal compliant. She is excited about discharge and met with the  today and says she feels more ready to go home. Staff will continue to monitor pt's safety and offer support as needed. Continue per [facility] protocol.

## 2018-11-12 PROCEDURE — 74011250637 HC RX REV CODE- 250/637: Performed by: PSYCHIATRY & NEUROLOGY

## 2018-11-12 PROCEDURE — 74011250637 HC RX REV CODE- 250/637

## 2018-11-12 PROCEDURE — 65220000003 HC RM SEMIPRIVATE PSYCH

## 2018-11-12 RX ADMIN — CEPHALEXIN 500 MG: 250 CAPSULE ORAL at 16:37

## 2018-11-12 RX ADMIN — RISPERIDONE 2 MG: 1 TABLET ORAL at 21:27

## 2018-11-12 RX ADMIN — TRAZODONE HYDROCHLORIDE 50 MG: 50 TABLET ORAL at 21:28

## 2018-11-12 RX ADMIN — LITHIUM CARBONATE 450 MG: 450 TABLET, EXTENDED RELEASE ORAL at 10:11

## 2018-11-12 RX ADMIN — CLONAZEPAM 0.5 MG: 0.5 TABLET ORAL at 10:11

## 2018-11-12 RX ADMIN — CLONAZEPAM 0.5 MG: 0.5 TABLET ORAL at 21:27

## 2018-11-12 RX ADMIN — CEPHALEXIN 500 MG: 250 CAPSULE ORAL at 10:10

## 2018-11-12 RX ADMIN — ACETAMINOPHEN 650 MG: 325 TABLET, FILM COATED ORAL at 04:33

## 2018-11-12 RX ADMIN — RISPERIDONE 2 MG: 1 TABLET ORAL at 10:11

## 2018-11-12 RX ADMIN — ALBUTEROL SULFATE 2 PUFF: 90 AEROSOL, METERED RESPIRATORY (INHALATION) at 13:01

## 2018-11-12 RX ADMIN — LITHIUM CARBONATE 450 MG: 450 TABLET, EXTENDED RELEASE ORAL at 21:27

## 2018-11-12 RX ADMIN — GABAPENTIN 400 MG: 100 CAPSULE ORAL at 15:44

## 2018-11-12 RX ADMIN — LAMOTRIGINE 100 MG: 100 TABLET ORAL at 10:11

## 2018-11-12 RX ADMIN — ACETAMINOPHEN 650 MG: 325 TABLET, FILM COATED ORAL at 13:01

## 2018-11-12 NOTE — PROGRESS NOTES
Problem: Suicide/Homicide (Adult/Pediatric) Goal: *STG: Remains safe in hospital 
Patient is alert and oriented ambulates with a steady gait and has remained safe Goal: *STG/LTG: Complies with medication therapy The patient is medication compliant Goal: *STG/LTG:  No longer expresses self destructive or suicidal/homicidal thoughts Patient denies any suicidal or homicidal ideations

## 2018-11-12 NOTE — BH NOTES
GROUP THERAPY PROGRESS NOTE The patient Danish Bauer a 24 y.o. female is participating in Creative Expression Group. Group time: 1 hour Personal goal for participation: To concentrate on selected task Goal orientation: social 
 
Group therapy participation: active Therapeutic interventions reviewed and discussed: Crafts, games, music Impression of participation: The patient was attentive. Ortega Ambrose 11/12/2018  5:55 PM

## 2018-11-12 NOTE — BH NOTES
Social Work Pt was seen in treatment team this morning. Pt is alert and oriented. Pt denies SI/HI. Pt's mood is improving and presents as euthymic, affect is congruent to mood. Pt's thought process is goal oriented regarding discharge. Pt's insight fair and judgment fair, reliability is fair. Plan is discharge tomorrow after additional day of observation. Social work department will continue to coordinate discharge plans.
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
## 2018-11-12 NOTE — BH NOTES
GROUP THERAPY PROGRESS NOTE The patient Heidi sesay 24 y.o. female is participating in Snaps. Group time: 45 minutes Personal goal for participation: To participate in mental health journey game Goal orientation:  personal 
 
Group therapy participation: active Therapeutic interventions reviewed and discussed: choices in recovery Impression of participation:  The patient was attentive. Phil Maldonado 11/12/2018  5:04 PM
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
## 2018-11-12 NOTE — PROGRESS NOTES
Laboratory Monitoring for Lithium This patient is currently prescribed the following medication(s):  
Current Facility-Administered Medications Medication Dose Route Frequency  clonazePAM (KlonoPIN) tablet 0.5 mg  0.5 mg Oral Q12H  
 lithium carbonate CR (ESKALITH CR) tablet 450 mg  450 mg Oral Q12H  cephALEXin (KEFLEX) capsule 500 mg  500 mg Oral BID  lamoTRIgine (LaMICtal) tablet 100 mg  100 mg Oral DAILY  risperiDONE (RisperDAL) tablet 2 mg  2 mg Oral Q12H  
 traZODone (DESYREL) tablet 50 mg  50 mg Oral QHS The following labs have been completed for monitoring of lithium: 
 
Lithium Serum Concentration Lab Results Component Value Date/Time Lithium level 0.75 11/11/2018 04:39 AM  
   
 
Renal Function and Chemistry Lab Results Component Value Date/Time Sodium 136 11/07/2018 04:36 AM  
 Potassium 3.8 11/07/2018 04:36 AM  
 Chloride 103 11/07/2018 04:36 AM  
 CO2 24 11/07/2018 04:36 AM  
 Anion gap 9 11/07/2018 04:36 AM  
 BUN 17 11/07/2018 04:36 AM  
 Creatinine 0.61 11/07/2018 04:36 AM  
 BUN/Creatinine ratio 28 (H) 11/07/2018 04:36 AM  
 
 
Assessment/Plan: 
Lithium level is within therapeutic limits, 0.75 mmol/L. Level was drawn approximately 7.5 hours and is at steady-state concentration. Same dose continued. Vincent Harrison, SallyD, BCPS 
[phone]

## 2018-11-12 NOTE — BH NOTES
Pt observed resting quietly, respirations even and unlabored. No s/s distress noted, no complaints voiced. Pt slept 5 hours. Will continue Q 15 minute monitoring for safety.

## 2018-11-13 VITALS
TEMPERATURE: 98.3 F | HEIGHT: 62 IN | BODY MASS INDEX: 22.45 KG/M2 | HEART RATE: 95 BPM | RESPIRATION RATE: 18 BRPM | OXYGEN SATURATION: 100 % | SYSTOLIC BLOOD PRESSURE: 110 MMHG | DIASTOLIC BLOOD PRESSURE: 73 MMHG | WEIGHT: 122 LBS

## 2018-11-13 PROCEDURE — 74011250637 HC RX REV CODE- 250/637: Performed by: PSYCHIATRY & NEUROLOGY

## 2018-11-13 RX ORDER — LAMOTRIGINE 100 MG/1
100 TABLET ORAL DAILY
Qty: 14 TAB | Refills: 0 | Status: SHIPPED | OUTPATIENT
Start: 2018-11-14 | End: 2018-12-05

## 2018-11-13 RX ORDER — LITHIUM CARBONATE 450 MG/1
450 TABLET ORAL EVERY 12 HOURS
Qty: 28 TAB | Refills: 0 | Status: SHIPPED | OUTPATIENT
Start: 2018-11-13 | End: 2019-09-30 | Stop reason: ALTCHOICE

## 2018-11-13 RX ORDER — TRAZODONE HYDROCHLORIDE 50 MG/1
50 TABLET ORAL
Qty: 14 TAB | Refills: 0 | Status: SHIPPED | OUTPATIENT
Start: 2018-11-13 | End: 2019-09-30 | Stop reason: ALTCHOICE

## 2018-11-13 RX ORDER — CLONAZEPAM 0.5 MG/1
0.5 TABLET ORAL EVERY 12 HOURS
Qty: 14 TAB | Refills: 0 | Status: SHIPPED | OUTPATIENT
Start: 2018-11-13 | End: 2018-12-05

## 2018-11-13 RX ORDER — RISPERIDONE 2 MG/1
2 TABLET, FILM COATED ORAL EVERY 12 HOURS
Qty: 28 TAB | Refills: 0 | Status: SHIPPED | OUTPATIENT
Start: 2018-11-13 | End: 2018-12-05

## 2018-11-13 RX ADMIN — CEPHALEXIN 500 MG: 250 CAPSULE ORAL at 07:42

## 2018-11-13 RX ADMIN — GABAPENTIN 400 MG: 100 CAPSULE ORAL at 05:17

## 2018-11-13 RX ADMIN — CLONAZEPAM 0.5 MG: 0.5 TABLET ORAL at 09:34

## 2018-11-13 RX ADMIN — LAMOTRIGINE 100 MG: 100 TABLET ORAL at 07:42

## 2018-11-13 RX ADMIN — LITHIUM CARBONATE 450 MG: 450 TABLET, EXTENDED RELEASE ORAL at 09:34

## 2018-11-13 RX ADMIN — RISPERIDONE 2 MG: 1 TABLET ORAL at 09:33

## 2018-11-13 NOTE — DISCHARGE INSTRUCTIONS
.[address] [phone]  [facility] [address] [place] [address]- [phone]

## 2018-11-13 NOTE — BH NOTES
Behavioral Health Transition Record to Provider Patient Name: Sheela Arroyo YOB: 1995 Medical Record Number: [id number] Date of Admission: 11/3/2018 Date of Discharge: 2018 Attending Provider: Tim Talbot, * Discharging Provider: Pita Bolivar MD 
To contact this individual call [phone] and ask the  to page. If unavailable, ask to be transferred to [facility] Provider on call. [facility] Provider will be available on call  and during holidays. Primary Care Provider: Giovanna Moss MD 
 
Allergies Allergen Reactions  Latex Hives  Hydrocodone Itching  Monistat 1 (Tioconazole) [Tioconazole] Swelling  Sulfa (Sulfonamide Antibiotics) Hives Reason for Admission: Pt admitted on voluntary status after being discharged AMA few days ago to attend her father's . Pt reported she is still paranoid and feels her  is out to harm her emotionally. Pt requesting to complete treatment to be stabilized on her medications. Admission Diagnosis: Depressive disorder Depressive disorder * No surgery found * Results for orders placed or performed during the hospital encounter of 18 CULTURE, URINE Result Value Ref Range Special Requests: NO SPECIAL REQUESTS Reflexed from [id number] Carrie Count >100,000 COLONIES/mL Culture result: (A) STAPHYLOCOCCUS SAPROPHYTICUS CLSI GUIDELINES DO NOT RECOMMEND REPORTING SUSCEPTIBILITIES FOR S. SAPROPHYTICUS. ORGANISM TYPICALLY RESPONDS TO AGENTS USED TO TREAT UNCOMPLICATED UTIs (E.G. NITROFURANTOIN, TMP/SMX OR A FLUOROQUINOLONE). CBC WITH AUTOMATED DIFF Result Value Ref Range WBC 7.9 3.6 - 11.0 K/uL  
 RBC 4.27 3.80 - 5.20 M/uL  
 HGB 11.8 11.5 - 16.0 g/dL HCT 36.4 35.0 - 47.0 % MCV 85.2 80.0 - 99.0 FL  
 MCH 27.6 26.0 - 34.0 PG  
 MCHC 32.4 30.0 - 36.5 g/dL  
 RDW 13.6 11.5 - 14.5 % PLATELET 207 575 - 146 K/uL  MPV 10.6 8.9 - 12.9 FL  
 NRBC 0.0 0  WBC ABSOLUTE NRBC 0.00 0.00 - 0.01 K/uL NEUTROPHILS 71 32 - 75 % LYMPHOCYTES 24 12 - 49 % MONOCYTES 4 (L) 5 - 13 % EOSINOPHILS 1 0 - 7 % BASOPHILS 0 0 - 1 % IMMATURE GRANULOCYTES 0 0.0 - 0.5 % ABS. NEUTROPHILS 5.7 1.8 - 8.0 K/UL  
 ABS. LYMPHOCYTES 1.9 0.8 - 3.5 K/UL  
 ABS. MONOCYTES 0.3 0.0 - 1.0 K/UL  
 ABS. EOSINOPHILS 0.1 0.0 - 0.4 K/UL  
 ABS. BASOPHILS 0.0 0.0 - 0.1 K/UL  
 ABS. IMM. GRANS. 0.0 0.00 - 0.04 K/UL  
 DF AUTOMATED METABOLIC PANEL, COMPREHENSIVE Result Value Ref Range Sodium 142 136 - 145 mmol/L Potassium 3.8 3.5 - 5.1 mmol/L Chloride 106 97 - 108 mmol/L  
 CO2 27 21 - 32 mmol/L Anion gap 9 5 - 15 mmol/L Glucose 124 (H) 65 - 100 mg/dL BUN 13 6 - 20 MG/DL Creatinine 0.74 0.55 - 1.02 MG/DL  
 BUN/Creatinine ratio 18 12 - 20 GFR est AA >60 >60 ml/min/1.73m2 GFR est non-AA >60 >60 ml/min/1.73m2 Calcium 8.8 8.5 - 10.1 MG/DL Bilirubin, total 0.2 0.2 - 1.0 MG/DL  
 ALT (SGPT) 24 12 - 78 U/L  
 AST (SGOT) 18 15 - 37 U/L Alk. phosphatase 60 45 - 117 U/L Protein, total 6.9 6.4 - 8.2 g/dL Albumin 3.8 3.5 - 5.0 g/dL Globulin 3.1 2.0 - 4.0 g/dL A-G Ratio 1.2 1.1 - 2.2 DRUG SCREEN, URINE Result Value Ref Range AMPHETAMINES NEGATIVE  NEG    
 BARBITURATES NEGATIVE  NEG BENZODIAZEPINES NEGATIVE  NEG    
 COCAINE NEGATIVE  NEG METHADONE NEGATIVE  NEG    
 OPIATES NEGATIVE  NEG    
 PCP(PHENCYCLIDINE) NEGATIVE  NEG    
 THC (TH-CANNABINOL) POSITIVE (A) NEG Drug screen comment (NOTE) ETHYL ALCOHOL Result Value Ref Range ALCOHOL(ETHYL),SERUM <10 <10 MG/DL URINALYSIS W/ REFLEX CULTURE Result Value Ref Range Color YELLOW/STRAW Appearance CLOUDY (A) CLEAR Specific gravity 1.020 1.003 - 1.030    
 pH (UA) 6.5 5.0 - 8.0 Protein NEGATIVE  NEG mg/dL Glucose NEGATIVE  NEG mg/dL Ketone NEGATIVE  NEG mg/dL Bilirubin NEGATIVE  NEG  Blood TRACE (A) NEG    
 Urobilinogen 0.2 0.2 - 1.0 EU/dL Nitrites NEGATIVE  NEG Leukocyte Esterase LARGE (A) NEG    
 WBC  0 - 4 /hpf  
 RBC 0-5 0 - 5 /hpf Epithelial cells FEW FEW /lpf Bacteria NEGATIVE  NEG /hpf  
 UA:UC IF INDICATED URINE CULTURE ORDERED (A) CNI    
HCG URINE, QL Result Value Ref Range HCG urine, QL NEGATIVE  NEG    
AMYLASE Result Value Ref Range Amylase 29 25 - 115 U/L  
CBC WITH AUTOMATED DIFF Result Value Ref Range WBC 6.6 3.6 - 11.0 K/uL  
 RBC 3.92 3.80 - 5.20 M/uL  
 HGB 10.9 (L) 11.5 - 16.0 g/dL HCT 33.2 (L) 35.0 - 47.0 % MCV 84.7 80.0 - 99.0 FL  
 MCH 27.8 26.0 - 34.0 PG  
 MCHC 32.8 30.0 - 36.5 g/dL  
 RDW 13.8 11.5 - 14.5 % PLATELET 498 284 - 011 K/uL MPV 10.6 8.9 - 12.9 FL  
 NRBC 0.0 0  WBC ABSOLUTE NRBC 0.00 0.00 - 0.01 K/uL NEUTROPHILS 69 32 - 75 % LYMPHOCYTES 21 12 - 49 % MONOCYTES 7 5 - 13 % EOSINOPHILS 2 0 - 7 % BASOPHILS 1 0 - 1 % IMMATURE GRANULOCYTES 1 (H) 0.0 - 0.5 % ABS. NEUTROPHILS 4.5 1.8 - 8.0 K/UL  
 ABS. LYMPHOCYTES 1.4 0.8 - 3.5 K/UL  
 ABS. MONOCYTES 0.4 0.0 - 1.0 K/UL  
 ABS. EOSINOPHILS 0.2 0.0 - 0.4 K/UL  
 ABS. BASOPHILS 0.1 0.0 - 0.1 K/UL  
 ABS. IMM. GRANS. 0.0 0.00 - 0.04 K/UL  
 DF AUTOMATED    
LIPASE Result Value Ref Range Lipase 110 73 - 249 U/L  
METABOLIC PANEL, COMPREHENSIVE Result Value Ref Range Sodium 136 136 - 145 mmol/L Potassium 3.8 3.5 - 5.1 mmol/L Chloride 103 97 - 108 mmol/L  
 CO2 24 21 - 32 mmol/L Anion gap 9 5 - 15 mmol/L Glucose 93 65 - 100 mg/dL BUN 17 6 - 20 MG/DL Creatinine 0.61 0.55 - 1.02 MG/DL  
 BUN/Creatinine ratio 28 (H) 12 - 20 GFR est AA >60 >60 ml/min/1.73m2 GFR est non-AA >60 >60 ml/min/1.73m2 Calcium 8.4 (L) 8.5 - 10.1 MG/DL Bilirubin, total 0.2 0.2 - 1.0 MG/DL  
 ALT (SGPT) 21 12 - 78 U/L  
 AST (SGOT) 13 (L) 15 - 37 U/L Alk. phosphatase 56 45 - 117 U/L Protein, total 6.2 (L) 6.4 - 8.2 g/dL  Albumin 3.3 (L) 3.5 - 5.0 g/dL Globulin 2.9 2.0 - 4.0 g/dL A-G Ratio 1.1 1.1 - 2.2 LITHIUM Result Value Ref Range Lithium level 0.75 0.60 - 1.20 MMOL/L Reported dose date: NOT PROVIDED Reported dose time: NOT PROVIDED Reported dose: NOT PROVIDED UNITS Immunizations administered during this encounter:  
Immunization History Administered Date(s) Administered  Influenza Vaccine (Quad) PF 10/24/2018  Rho(D) Immune Globulin - IM 09/11/2015, 01/26/2017 Screening for Metabolic Disorders for Patients on Antipsychotic Medications 
(Data obtained from the EMR) Estimated Body Mass Index Estimated body mass index is 22.31 kg/m² as calculated from the following: 
  Height as of this encounter: 5' 2\" (1.575 m). Weight as of this encounter: 55.3 kg (122 lb). Vital Signs/Blood Pressure Visit Vitals /76 (BP 1 Location: Right arm, BP Patient Position: Sitting) Pulse 95 Temp 97.3 °F (36.3 °C) Resp 18 Ht 5' 2\" (1.575 m) Wt 55.3 kg (122 lb) SpO2 100% BMI 22.31 kg/m² Blood Glucose/Hemoglobin A1c Lab Results Component Value Date/Time Glucose 93 11/07/2018 04:36 AM  
 Glucose (POC) 90 12/07/2015 04:40 PM  
 
 
Lab Results Component Value Date/Time Hemoglobin A1c 4.5 10/25/2018 04:39 AM  
 
  
Lipid Panel Lab Results Component Value Date/Time Cholesterol, total 122 10/25/2018 04:39 AM  
 HDL Cholesterol 41 10/25/2018 04:39 AM  
 LDL, calculated 58.4 10/25/2018 04:39 AM  
 Triglyceride 113 10/25/2018 04:39 AM  
 CHOL/HDL Ratio 3.0 10/25/2018 04:39 AM  
 
  
Discharge Diagnosis: Please refer to psychiatrist's note. Discharge Plan: Social Work-Pt will be discharged today. Pt is alert and oriented. Pt denies SI/HI. Pt is free of delusions. Pt's mood is euthymic. Pt's thought process is linear and goal oriented. Pt will be discharged to her mother and she will go to live at her brother's home with her son. Pt will be provided with prescriptions.  Pt will have in-home intake 11/14/2018 @ 10:30am by [facility]. to establish outpatient mental health skill building services. Pt is to follow-up with Dillon OQUENDO 11/15/2018 @ 10:00am for intake to establish outpatient psychiatry and therapy services. Pt is in agreement with the plan. [facility] care plan will be faxed electronically via [facility] GXJ#[phone]. Discharge Medication List and Instructions:  
Current Discharge Medication List  
  
 
 
Unresulted Labs (24h ago, onward) None To obtain results of studies pending at discharge, please contact [phone] Follow-up Information Follow up With Specialties Details Why Contact Info [facility].  On 11/14/2018 Follow up 11/14/2018 @ 10:30am in-home for intake to establish outpatient mental health skill building services. Nashjennifer Delano [address] [place] [address] [address] [facility], [address] 
[phone] 
[phone] Dillon B  On 11/15/2018 Follow up 11/15/2018 @ 10:00am for intake to establish outpatient therapy and psychiatry services *stevan bring id, insurance card and medication list [address]  [address] [place] [address] 
[phone] 
TKB#[phone] Advanced Directive:  
Does the patient have an appointed surrogate decision maker? Yes Does the patient have a Medical Advance Directive? No 
Does the patient have a Psychiatric Advance Directive? No 
If the patient does not have a surrogate or Medical Advance Directive AND Psychiatric Advance Directive, the patient was offered information on these advance directives Patient will complete at a later time Patient Instructions: Please continue all medications until otherwise directed by physician. Tobacco Cessation Discharge Plan:  
Is the patient a smoker and needs referral for smoking cessation? Not applicable Patient referred to the following for smoking cessation with an appointment? Not applicable Patient was offered medication to assist with smoking cessation at discharge? Not applicable Was education for smoking cessation added to the discharge instructions? Not applicable Alcohol/Substance Abuse Discharge Plan:  
Does the patient have a history of substance/alcohol abuse and requires a referral for treatment? Not applicable Patient referred to the following for substance/alcohol abuse treatment with an appointment? Not applicable Patient was offered medication to assist with alcohol cessation at discharge? Not applicable Was education for substance/alcohol abuse added to discharge instructions? Not applicable Patient discharged to Home; provided to the patient/caregiver either in hard copy or electronically.

## 2018-11-13 NOTE — DISCHARGE SUMMARY
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 11/13/2018 12:21 PM      START taking these medications    Details   clonazePAM (KLONOPIN) 0.5 mg tablet Take 1 Tab by mouth every twelve (12) hours. Max Daily Amount: 1 mg. ONE WEEK TAPER DO NOT REFILL, Print, Disp-14 Tab, R-0      lithium carbonate CR (ESKALITH CR) 450 mg CR tablet Take 1 Tab by mouth every twelve (12) hours. , Print, Disp-28 Tab, R-0         CONTINUE these medications which have CHANGED    Details   lamoTRIgine (LAMICTAL) 100 mg tablet Take 1 Tab by mouth daily. , Print, Disp-14 Tab, R-0      risperiDONE (RISPERDAL) 2 mg tablet Take 1 Tab by mouth every twelve (12) hours. , Print, Disp-28 Tab, R-0      traZODone (DESYREL) 50 mg tablet Take 1 Tab by mouth nightly. , Print, Disp-14 Tab, R-0         CONTINUE these medications which have NOT CHANGED    Details   gabapentin (NEURONTIN) 400 mg capsule Take 1 Cap by mouth every eight (8) hours as needed (Panic attack / anxiety). , Print, Disp-21 Cap, R-0      valACYclovir (VALTREX) 500 mg tablet Take 500 mg by mouth daily. , Historical Med         STOP taking these medications       ARIPiprazole (ABILIFY) 5 mg tablet Comments:   Reason for Stopping: A coordinated, multidisplinary treatment team round was conducted with Augusta Tenorio is done daily here at [facility]. This team consists of the nurse, psychiatric unit pharmcist,  and writer. I have spent greater than 35 minutes on discharge work.     Signed:  Diana Quiroz MD  11/13/2018

## 2018-11-13 NOTE — PROGRESS NOTES
Problem: Suicide/Homicide (Adult/Pediatric) Goal: *STG: Remains safe in hospital 
Alert and oriented ambulatory with a steady gait has remained safe Goal: *STG: Attends activities and groups Attends groups and activites Goal: *STG/LTG: Complies with medication therapy The patient continues to be medication compliant

## 2018-11-13 NOTE — BH NOTES
Pt is visible in milieu, interacts with peers and watches t.v. Pt verbalized acceptance of her discharge in the morning. Pt observed resting quietly, respirations even and unlabored. No s/s distress noted, no complaints voiced. Pt slept 5.5 hours. Will continue Q 15 minute monitoring for safety.

## 2018-11-13 NOTE — BH NOTES
Discharge instructions were given and explained patient verbalized an understanding. The patient had no home medications to be returned she received her valuables along with all other belongings upon discharge. Staff took patient off the unit.

## 2018-11-14 ENCOUNTER — OFFICE VISIT (OUTPATIENT)
Dept: FAMILY MEDICINE CLINIC | Age: 23
End: 2018-11-14

## 2018-11-14 VITALS
HEART RATE: 98 BPM | SYSTOLIC BLOOD PRESSURE: 122 MMHG | DIASTOLIC BLOOD PRESSURE: 68 MMHG | TEMPERATURE: 98.2 F | BODY MASS INDEX: 23.19 KG/M2 | RESPIRATION RATE: 18 BRPM | OXYGEN SATURATION: 98 % | WEIGHT: 126 LBS | HEIGHT: 62 IN

## 2018-11-14 DIAGNOSIS — I88.9 LYMPHADENITIS: Primary | ICD-10-CM

## 2018-11-14 DIAGNOSIS — R59.0 ENLARGED LYMPH NODE IN NECK: ICD-10-CM

## 2018-11-14 DIAGNOSIS — M79.7 FIBROMYALGIA: ICD-10-CM

## 2018-11-14 DIAGNOSIS — J02.9 SORE THROAT: ICD-10-CM

## 2018-11-14 LAB
MONONUCLEOSIS SCREEN POC: NEGATIVE
S PYO AG THROAT QL: NEGATIVE
VALID INTERNAL CONTROL?: YES
VALID INTERNAL CONTROL?: YES

## 2018-11-14 RX ORDER — AMITRIPTYLINE HYDROCHLORIDE 10 MG/1
10 TABLET, FILM COATED ORAL
Qty: 30 TAB | Refills: 2 | Status: SHIPPED | OUTPATIENT
Start: 2018-11-14 | End: 2019-09-30

## 2018-11-14 RX ORDER — TIZANIDINE 2 MG/1
2 TABLET ORAL
Qty: 90 TAB | Refills: 1 | Status: SHIPPED | OUTPATIENT
Start: 2018-11-14 | End: 2018-12-05 | Stop reason: DRUGHIGH

## 2018-11-14 RX ORDER — AMOXICILLIN AND CLAVULANATE POTASSIUM 875; 125 MG/1; MG/1
1 TABLET, FILM COATED ORAL EVERY 12 HOURS
Qty: 20 TAB | Refills: 0 | Status: SHIPPED | OUTPATIENT
Start: 2018-11-14 | End: 2018-11-24

## 2018-11-14 NOTE — PROGRESS NOTES
Identified pt with two pt identifiers(name and ). Chief Complaint   Patient presents with    Sore Throat     Began about 4 days ago. just on left side.  Neck Swelling        Health Maintenance Due   Topic    HPV Age 9Y-34Y (1 - Female 3-dose series)    DTaP/Tdap/Td series (1 - Tdap)    PAP AKA CERVICAL CYTOLOGY        Wt Readings from Last 3 Encounters:   18 126 lb (57.2 kg)   18 122 lb (55.3 kg)   17 125 lb (56.7 kg)     Temp Readings from Last 3 Encounters:   18 98.2 °F (36.8 °C) (Oral)   18 97.3 °F (36.3 °C) (Oral)   17 98.2 °F (36.8 °C)     BP Readings from Last 3 Encounters:   18 122/68   18 122/82   17 135/75     Pulse Readings from Last 3 Encounters:   18 98   18 71   17 89         Learning Assessment:  :     Learning Assessment 2015   PRIMARY LEARNER Patient   HIGHEST LEVEL OF EDUCATION - PRIMARY LEARNER  SOME COLLEGE   BARRIERS PRIMARY LEARNER NONE   CO-LEARNER CAREGIVER No   PRIMARY LANGUAGE ENGLISH   LEARNER PREFERENCE PRIMARY DEMONSTRATION     LISTENING   ANSWERED BY patient   RELATIONSHIP SELF       Depression Screening:  :     No flowsheet data found. Fall Risk Assessment:  :     No flowsheet data found. Abuse Screening:  :     Abuse Screening Questionnaire 2018   Do you ever feel afraid of your partner? N   Are you in a relationship with someone who physically or mentally threatens you? N   Is it safe for you to go home? Y       Coordination of Care Questionnaire:  :     1) Have you been to an emergency room, urgent care clinic since your last visit? Yes [address] [facility] episode. Hospitalized since your last visit? yes 3 weeks.  Released yesterday              2) Have you seen or consulted any other health care providers outside of [address] [place] [address] since your last visit? no  (Include any pap smears or colon screenings in this section.)    3) Do you have an Advance Directive on file? no  Are you interested in receiving information about Advance Directives? no    Reviewed record in preparation for visit and have obtained necessary documentation. Medication reconciliation up to date and corrected with patient at this time.
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
## 2018-11-19 ENCOUNTER — OFFICE VISIT (OUTPATIENT)
Dept: FAMILY MEDICINE CLINIC | Age: 23
End: 2018-11-19

## 2018-11-19 VITALS
OXYGEN SATURATION: 98 % | SYSTOLIC BLOOD PRESSURE: 113 MMHG | WEIGHT: 129 LBS | DIASTOLIC BLOOD PRESSURE: 78 MMHG | BODY MASS INDEX: 23.74 KG/M2 | TEMPERATURE: 97.9 F | RESPIRATION RATE: 16 BRPM | HEART RATE: 86 BPM | HEIGHT: 62 IN

## 2018-11-19 DIAGNOSIS — F32.3 DEPRESSIVE PSYCHOSIS (HCC): Primary | ICD-10-CM

## 2018-11-19 NOTE — PROGRESS NOTES
Identified pt with two pt identifiers(name and ). Chief Complaint Patient presents with  Anxiety  
  klonopin is not working - cannot get appt with psych until Monday d/t the holidays - is getting sudden urges to move her body/stretch and her head jerks  Muscle Pain  
  would like to discuss taking off of muscle relaxers and starting CBD oil  Agitation  
  feels her agitation is severe again since being relased from hospital  
  
 
Health Maintenance Due Topic  HPV Age 9Y-34Y (1 - Female 3-dose series)  DTaP/Tdap/Td series (1 - Tdap)  PAP AKA CERVICAL CYTOLOGY Wt Readings from Last 3 Encounters:  
18 129 lb (58.5 kg)  
18 126 lb (57.2 kg) 18 122 lb (55.3 kg) Temp Readings from Last 3 Encounters:  
18 97.9 °F (36.6 °C) (Oral)  
18 98.2 °F (36.8 °C) (Oral) 18 97.3 °F (36.3 °C) (Oral) BP Readings from Last 3 Encounters:  
18 113/78  
18 122/68  
18 122/82 Pulse Readings from Last 3 Encounters:  
18 86  
18 98  
18 71 Learning Assessment: 
:  
 
Learning Assessment 2015 PRIMARY LEARNER Patient HIGHEST LEVEL OF EDUCATION - PRIMARY LEARNER  SOME COLLEGE  
BARRIERS PRIMARY LEARNER NONE  
CO-LEARNER CAREGIVER No  
PRIMARY LANGUAGE ENGLISH  
LEARNER PREFERENCE PRIMARY DEMONSTRATION  
  LISTENING  
ANSWERED BY patient RELATIONSHIP SELF Depression Screening: 
:  
 
PHQ over the last two weeks 2018 Little interest or pleasure in doing things Several days Feeling down, depressed, irritable, or hopeless Several days Total Score PHQ 2 2 Fall Risk Assessment: 
:  
 
No flowsheet data found. Abuse Screening: 
:  
 
Abuse Screening Questionnaire 2018 Do you ever feel afraid of your partner? Manuel Dias Are you in a relationship with someone who physically or mentally threatens you? Manuel Dias Is it safe for you to go home? Phill Grandchild Coordination of Care Questionnaire: 
:  
 
1) Have you been to an emergency room, urgent care clinic since your last visit? no  
Hospitalized since your last visit? no          
 
2) Have you seen or consulted any other health care providers outside of [address] [place] [address] since your last visit? no  (Include any pap smears or colon screenings in this section.) 3) Do you have an Advance Directive on file? no 
Are you interested in receiving information about Advance Directives? no 
 
Patient is accompanied by self. Reviewed record in preparation for visit and have obtained necessary documentation. Medication reconciliation up to date and corrected with patient at this time.
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
## 2018-12-05 ENCOUNTER — OFFICE VISIT (OUTPATIENT)
Dept: FAMILY MEDICINE CLINIC | Age: 23
End: 2018-12-05

## 2018-12-05 VITALS
RESPIRATION RATE: 16 BRPM | BODY MASS INDEX: 23.92 KG/M2 | HEART RATE: 94 BPM | OXYGEN SATURATION: 98 % | SYSTOLIC BLOOD PRESSURE: 108 MMHG | HEIGHT: 62 IN | DIASTOLIC BLOOD PRESSURE: 62 MMHG | WEIGHT: 130 LBS | TEMPERATURE: 97.7 F

## 2018-12-05 DIAGNOSIS — M79.7 FIBROMYALGIA: Primary | ICD-10-CM

## 2018-12-05 DIAGNOSIS — L70.0 ACNE VULGARIS: ICD-10-CM

## 2018-12-05 RX ORDER — CLINDAMYCIN PHOSPHATE AND BENZOYL PEROXIDE 10; 50 MG/G; MG/G
GEL TOPICAL
Qty: 1 TUBE | Refills: 6 | Status: SHIPPED | OUTPATIENT
Start: 2018-12-05 | End: 2019-09-30

## 2018-12-05 RX ORDER — TIZANIDINE 4 MG/1
4 TABLET ORAL
Qty: 90 TAB | Refills: 2 | Status: SHIPPED | OUTPATIENT
Start: 2018-12-05 | End: 2019-09-30

## 2018-12-05 RX ORDER — OLANZAPINE 10 MG/1
15 TABLET ORAL
COMMUNITY
Start: 2018-11-29 | End: 2020-12-23

## 2018-12-05 RX ORDER — CLONAZEPAM 1 MG/1
1 TABLET ORAL DAILY
COMMUNITY
Start: 2018-11-29 | End: 2020-12-23

## 2018-12-05 RX ORDER — LORAZEPAM 1 MG/1
1 TABLET ORAL 3 TIMES DAILY
COMMUNITY
End: 2019-09-30 | Stop reason: ALTCHOICE

## 2018-12-05 NOTE — PROGRESS NOTES
Identified pt with two pt identifiers(name and ). Chief Complaint   Patient presents with    Acne     would like to see if Dr. Pastor Rinne would prescribe a medicaiton to help.  Medication Evaluation     would like to see if Tinazadine dose can be incresed. Has been haivng to take two the past week. [address]  or Monday night for panic attack and was put back on Ativan     New Order     would like to see Nutritionist.         Health Maintenance Due   Topic    HPV Age 9Y-34Y (1 - Female 3-dose series)    DTaP/Tdap/Td series (1 - Tdap)    PAP AKA CERVICAL CYTOLOGY        Wt Readings from Last 3 Encounters:   18 130 lb (59 kg)   18 129 lb (58.5 kg)   18 126 lb (57.2 kg)     Temp Readings from Last 3 Encounters:   18 97.7 °F (36.5 °C) (Oral)   18 97.9 °F (36.6 °C) (Oral)   18 98.2 °F (36.8 °C) (Oral)     BP Readings from Last 3 Encounters:   18 108/62   18 113/78   18 122/68     Pulse Readings from Last 3 Encounters:   18 94   18 86   18 98         Learning Assessment:  :     Learning Assessment 2015   PRIMARY LEARNER Patient   HIGHEST LEVEL OF EDUCATION - PRIMARY LEARNER  SOME COLLEGE   BARRIERS PRIMARY LEARNER NONE   CO-LEARNER CAREGIVER No   PRIMARY LANGUAGE ENGLISH   LEARNER PREFERENCE PRIMARY DEMONSTRATION     LISTENING   ANSWERED BY patient   RELATIONSHIP SELF       Depression Screening:  :     PHQ over the last two weeks 2018   Little interest or pleasure in doing things Several days   Feeling down, depressed, irritable, or hopeless Several days   Total Score PHQ 2 2       Fall Risk Assessment:  :     No flowsheet data found. Abuse Screening:  :     Abuse Screening Questionnaire 2018   Do you ever feel afraid of your partner? N   Are you in a relationship with someone who physically or mentally threatens you? N   Is it safe for you to go home?  Y       Coordination of Care Questionnaire:  : 1) Have you been to an emergency room, urgent care clinic since your last visit? Yes [facility] Sunday or Monday night for panic attack and was put back on Ativan   Hospitalized since your last visit? no             2) Have you seen or consulted any other health care providers outside of [address] [place] [address] since your last visit? no  (Include any pap smears or colon screenings in this section.)    3) Do you have an Advance Directive on file? no  Are you interested in receiving information about Advance Directives? no    Reviewed record in preparation for visit and have obtained necessary documentation. Medication reconciliation up to date and corrected with patient at this time.
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
## 2018-12-05 NOTE — PATIENT INSTRUCTIONS
Acne: Care Instructions  Your Care Instructions  Acne is a skin problem that shows up as blackheads, whiteheads, and pimples. It most often affects the face, neck, and upper body. Acne occurs when oil and dead skin cells clog the skin's pores. Acne usually starts during the teen years and often lasts into adulthood. Gentle cleansing every day controls most mild acne. If home treatment does not work, your doctor may prescribe creams, antibiotics, or a stronger medicine called isotretinoin. Sometimes birth control pills help women who have monthly acne flare-ups. Follow-up care is a key part of your treatment and safety. Be sure to make and go to all appointments, and call your doctor if you are having problems. It's also a good idea to know your test results and keep a list of the medicines you take. How can you care for yourself at home? · Gently wash your face 1 or 2 times a day with warm (not hot) water and a mild soap or cleanser. Always rinse well. · Use an over-the-counter lotion or gel that contains benzoyl peroxide. Start with a small amount of 2.5% benzoyl peroxide and increase the strength as needed. Benzoyl peroxide works well for acne, but you may need to use it for up to 2 months before your acne starts to improve. · Apply acne cream, lotion, or gel to all the places you get pimples, blackheads, or whiteheads, not just where you have them now. Follow the instructions carefully. If your skin gets too dry and scaly or red and sore, reduce the amount. For the best results, apply medicines as directed. Try not to miss doses. · Do not squeeze or pick pimples and blackheads. This can cause infection and scarring. · Use only oil-free makeup, sunscreen, and other skin care products that will not clog your pores. · Wash your hair every day, and try to keep it off your face and shoulders. Consider pinning it back or cutting it short. When should you call for help?   Watch closely for changes in your health, and be sure to contact your doctor if:    · You have tried home treatment for 6 to 8 weeks and your acne is not better or gets worse. Your doctor may need to add to or change your treatment.     · Your pimples become large and hard or filled with fluid.     · Scars form after pimples heal.     · You feel sad or hopeless, lack energy, or have other signs of depression while you are taking the prescription medicine isotretinoin.     · You start to have other symptoms, such as facial hair growth in women or bone and muscle pain. Where can you learn more? Go to http://kirk-izzy.info/. Enter V108 in the search box to learn more about \"Acne: Care Instructions. \"  Current as of: April 18, 2018  Content Version: 11.8  © 5090-1894 [employer]. Care instructions adapted under license by [employer] (which disclaims liability or warranty for this information). If you have questions about a medical condition or this instruction, always ask your healthcare professional. [address] any warranty or liability for your use of this information.

## 2018-12-06 ENCOUNTER — TELEPHONE (OUTPATIENT)
Dept: FAMILY MEDICINE CLINIC | Age: 23
End: 2018-12-06

## 2018-12-06 NOTE — TELEPHONE ENCOUNTER
Pt called and stated cream sent over to pharmacy needs a prior auth and wants to know if that is being worked on or if something else can be called into pharmacy instead

## 2019-01-01 ENCOUNTER — IP HISTORICAL/CONVERTED ENCOUNTER (OUTPATIENT)
Dept: OTHER | Age: 24
End: 2019-01-01

## 2019-04-14 NOTE — PROGRESS NOTES
Laboratory monitoring for mood stabilizer and antipsychotics: 
 
Recommended baseline monitoring has been completed based on this patient's current medication regimen. The patient is currently taking the following medication(s):  
Current Facility-Administered Medications Medication Dose Route Frequency  gabapentin (NEURONTIN) capsule 400 mg  400 mg Oral TID  risperiDONE (RisperDAL) tablet 2 mg  2 mg Oral BID  traZODone (DESYREL) tablet 50 mg  50 mg Oral QHS  lamoTRIgine (LaMICtal) tablet 75 mg  75 mg Oral DAILY Height, Weight, BMI Estimation Estimated body mass index is 22.31 kg/m² as calculated from the following: 
  Height as of this encounter: 157.5 cm (62\"). Weight as of this encounter: 55.3 kg (122 lb). Renal Function, Hepatic Function and Chemistry Estimated Creatinine Clearance: 92.7 mL/min (based on SCr of 0.74 mg/dL). Lab Results Component Value Date/Time Sodium 142 11/03/2018 06:38 PM  
 Potassium 3.8 11/03/2018 06:38 PM  
 Chloride 106 11/03/2018 06:38 PM  
 CO2 27 11/03/2018 06:38 PM  
 Anion gap 9 11/03/2018 06:38 PM  
 Glucose 124 (H) 11/03/2018 06:38 PM  
 BUN 13 11/03/2018 06:38 PM  
 Creatinine 0.74 11/03/2018 06:38 PM  
 BUN/Creatinine ratio 18 11/03/2018 06:38 PM  
 GFR est AA >60 11/03/2018 06:38 PM  
 GFR est non-AA >60 11/03/2018 06:38 PM  
 Calcium 8.8 11/03/2018 06:38 PM  
 ALT (SGPT) 24 11/03/2018 06:38 PM  
 AST (SGOT) 18 11/03/2018 06:38 PM  
 Alk. phosphatase 60 11/03/2018 06:38 PM  
 Protein, total 6.9 11/03/2018 06:38 PM  
 Albumin 3.8 11/03/2018 06:38 PM  
 Globulin 3.1 11/03/2018 06:38 PM  
 A-G Ratio 1.2 11/03/2018 06:38 PM  
 Bilirubin, total 0.2 11/03/2018 06:38 PM  
 
 
Lab Results Component Value Date/Time Glucose 124 (H) 11/03/2018 06:38 PM  
 Glucose (POC) 90 12/07/2015 04:40 PM  
 
 
Lab Results Component Value Date/Time Hemoglobin A1c 4.5 10/25/2018 04:39 AM  
 
 
Hematology Lab Results Component Value Date/Time  WBC 7.9 11/03/2018 06:38 PM  
 Hemoglobin (POC) 12.9 12/07/2015 04:40 PM  
 HGB 11.8 11/03/2018 06:38 PM  
 Hematocrit (POC) 38 12/07/2015 04:40 PM  
 HCT 36.4 11/03/2018 06:38 PM  
 PLATELET 495 63/41/1296 06:38 PM  
 MCV 85.2 11/03/2018 06:38 PM  
 
 
Lipids Lab Results Component Value Date/Time Cholesterol, total 122 10/25/2018 04:39 AM  
 HDL Cholesterol 41 10/25/2018 04:39 AM  
 LDL, calculated 58.4 10/25/2018 04:39 AM  
 Triglyceride 113 10/25/2018 04:39 AM  
 CHOL/HDL Ratio 3.0 10/25/2018 04:39 AM  
 
 
Thyroid Function Lab Results Component Value Date/Time TSH 0.82 10/25/2018 04:39 AM  
 
Vitals Visit Vitals /69 (BP 1 Location: Left arm, BP Patient Position: Sitting) Pulse 100 Temp 98 °F (36.7 °C) Resp 18 Ht 157.5 cm (62\") Wt 55.3 kg (122 lb) SpO2 98% BMI 22.31 kg/m² Pregnancy Test 
Lab Results Component Value Date/Time HCG urine, QL NEGATIVE  11/04/2018 01:30 AM  
 Pregnancy test,urine (POC) NEGATIVE  10/22/2018 03:15 PM  
 HCG, Ql. NEGATIVE  01/04/2017 11:32 PM  
 Beta HCG, QT <1 01/26/2017 04:33 PM  
 HCG urine, Ql. (POC) Negative 02/25/2016 03:30 PM  
 
 
Jeniffer Staley, PharmD, BCPS 
[phone] (pharmacy) MILD/tachypneic

## 2019-09-30 ENCOUNTER — OFFICE VISIT (OUTPATIENT)
Dept: FAMILY MEDICINE CLINIC | Age: 24
End: 2019-09-30

## 2019-09-30 ENCOUNTER — APPOINTMENT (OUTPATIENT)
Dept: CT IMAGING | Age: 24
End: 2019-09-30
Attending: EMERGENCY MEDICINE
Payer: COMMERCIAL

## 2019-09-30 ENCOUNTER — APPOINTMENT (OUTPATIENT)
Dept: NUCLEAR MEDICINE | Age: 24
End: 2019-09-30
Attending: STUDENT IN AN ORGANIZED HEALTH CARE EDUCATION/TRAINING PROGRAM
Payer: COMMERCIAL

## 2019-09-30 ENCOUNTER — APPOINTMENT (OUTPATIENT)
Dept: ULTRASOUND IMAGING | Age: 24
End: 2019-09-30
Attending: EMERGENCY MEDICINE
Payer: COMMERCIAL

## 2019-09-30 ENCOUNTER — HOSPITAL ENCOUNTER (OUTPATIENT)
Age: 24
Setting detail: OBSERVATION
Discharge: HOME | End: 2019-10-03
Attending: EMERGENCY MEDICINE | Admitting: FAMILY MEDICINE
Payer: COMMERCIAL

## 2019-09-30 VITALS
WEIGHT: 138 LBS | TEMPERATURE: 98.4 F | SYSTOLIC BLOOD PRESSURE: 112 MMHG | OXYGEN SATURATION: 99 % | HEART RATE: 59 BPM | HEIGHT: 62 IN | RESPIRATION RATE: 18 BRPM | BODY MASS INDEX: 25.4 KG/M2 | DIASTOLIC BLOOD PRESSURE: 83 MMHG

## 2019-09-30 DIAGNOSIS — K82.8 GALLBLADDER SLUDGE: Primary | ICD-10-CM

## 2019-09-30 DIAGNOSIS — R10.11 RIGHT UPPER QUADRANT ABDOMINAL PAIN: Primary | ICD-10-CM

## 2019-09-30 DIAGNOSIS — M79.18 MUSCULOSKELETAL PAIN: ICD-10-CM

## 2019-09-30 DIAGNOSIS — R11.2 NAUSEA AND VOMITING, INTRACTABILITY OF VOMITING NOT SPECIFIED, UNSPECIFIED VOMITING TYPE: ICD-10-CM

## 2019-09-30 LAB
ALBUMIN SERPL-MCNC: 3.9 G/DL (ref 3.5–5)
ALBUMIN/GLOB SERPL: 1.3 {RATIO} (ref 1.1–2.2)
ALP SERPL-CCNC: 72 U/L (ref 45–117)
ALT SERPL-CCNC: 18 U/L (ref 12–78)
AMPHET UR QL SCN: NEGATIVE
ANION GAP SERPL CALC-SCNC: 7 MMOL/L (ref 5–15)
APPEARANCE UR: CLEAR
AST SERPL-CCNC: 17 U/L (ref 15–37)
BACTERIA URNS QL MICRO: NEGATIVE /HPF
BARBITURATES UR QL SCN: NEGATIVE
BASOPHILS # BLD: 0 K/UL (ref 0–0.1)
BASOPHILS NFR BLD: 1 % (ref 0–1)
BENZODIAZ UR QL: NEGATIVE
BILIRUB SERPL-MCNC: 0.3 MG/DL (ref 0.2–1)
BILIRUB UR QL: NEGATIVE
BUN SERPL-MCNC: 10 MG/DL (ref 6–20)
BUN/CREAT SERPL: 12 (ref 12–20)
CALCIUM SERPL-MCNC: 8.4 MG/DL (ref 8.5–10.1)
CANNABINOIDS UR QL SCN: POSITIVE
CHLORIDE SERPL-SCNC: 108 MMOL/L (ref 97–108)
CO2 SERPL-SCNC: 25 MMOL/L (ref 21–32)
COCAINE UR QL SCN: NEGATIVE
COLOR UR: NORMAL
CREAT SERPL-MCNC: 0.84 MG/DL (ref 0.55–1.02)
DIFFERENTIAL METHOD BLD: ABNORMAL
DRUG SCRN COMMENT,DRGCM: ABNORMAL
EOSINOPHIL # BLD: 0 K/UL (ref 0–0.4)
EOSINOPHIL NFR BLD: 1 % (ref 0–7)
EPITH CASTS URNS QL MICRO: NORMAL /LPF
ERYTHROCYTE [DISTWIDTH] IN BLOOD BY AUTOMATED COUNT: 14.5 % (ref 11.5–14.5)
GLOBULIN SER CALC-MCNC: 2.9 G/DL (ref 2–4)
GLUCOSE SERPL-MCNC: 127 MG/DL (ref 65–100)
GLUCOSE UR STRIP.AUTO-MCNC: NEGATIVE MG/DL
HCG UR QL: NEGATIVE
HCT VFR BLD AUTO: 35.3 % (ref 35–47)
HGB BLD-MCNC: 11.3 G/DL (ref 11.5–16)
HGB UR QL STRIP: NEGATIVE
HYALINE CASTS URNS QL MICRO: NORMAL /LPF (ref 0–5)
IMM GRANULOCYTES # BLD AUTO: 0 K/UL (ref 0–0.04)
IMM GRANULOCYTES NFR BLD AUTO: 0 % (ref 0–0.5)
KETONES UR QL STRIP.AUTO: NEGATIVE MG/DL
LEUKOCYTE ESTERASE UR QL STRIP.AUTO: NEGATIVE
LIPASE SERPL-CCNC: 59 U/L (ref 73–393)
LYMPHOCYTES # BLD: 2.2 K/UL (ref 0.8–3.5)
LYMPHOCYTES NFR BLD: 36 % (ref 12–49)
MCH RBC QN AUTO: 26 PG (ref 26–34)
MCHC RBC AUTO-ENTMCNC: 32 G/DL (ref 30–36.5)
MCV RBC AUTO: 81.1 FL (ref 80–99)
METHADONE UR QL: NEGATIVE
MONOCYTES # BLD: 0.3 K/UL (ref 0–1)
MONOCYTES NFR BLD: 6 % (ref 5–13)
NEUTS SEG # BLD: 3.5 K/UL (ref 1.8–8)
NEUTS SEG NFR BLD: 56 % (ref 32–75)
NITRITE UR QL STRIP.AUTO: NEGATIVE
NRBC # BLD: 0 K/UL (ref 0–0.01)
NRBC BLD-RTO: 0 PER 100 WBC
OPIATES UR QL: POSITIVE
PCP UR QL: NEGATIVE
PH UR STRIP: 6.5 [PH] (ref 5–8)
PLATELET # BLD AUTO: 253 K/UL (ref 150–400)
PMV BLD AUTO: 10.6 FL (ref 8.9–12.9)
POTASSIUM SERPL-SCNC: 3.4 MMOL/L (ref 3.5–5.1)
PROT SERPL-MCNC: 6.8 G/DL (ref 6.4–8.2)
PROT UR STRIP-MCNC: NEGATIVE MG/DL
RBC # BLD AUTO: 4.35 M/UL (ref 3.8–5.2)
RBC #/AREA URNS HPF: NORMAL /HPF (ref 0–5)
SODIUM SERPL-SCNC: 140 MMOL/L (ref 136–145)
SP GR UR REFRACTOMETRY: 1.01 (ref 1–1.03)
UR CULT HOLD, URHOLD: NORMAL
UROBILINOGEN UR QL STRIP.AUTO: 0.2 EU/DL (ref 0.2–1)
WBC # BLD AUTO: 6.2 K/UL (ref 3.6–11)
WBC URNS QL MICRO: NORMAL /HPF (ref 0–4)

## 2019-09-30 PROCEDURE — 74011250637 HC RX REV CODE- 250/637: Performed by: STUDENT IN AN ORGANIZED HEALTH CARE EDUCATION/TRAINING PROGRAM

## 2019-09-30 PROCEDURE — 96376 TX/PRO/DX INJ SAME DRUG ADON: CPT

## 2019-09-30 PROCEDURE — 36415 COLL VENOUS BLD VENIPUNCTURE: CPT

## 2019-09-30 PROCEDURE — 74011636320 HC RX REV CODE- 636/320: Performed by: STUDENT IN AN ORGANIZED HEALTH CARE EDUCATION/TRAINING PROGRAM

## 2019-09-30 PROCEDURE — 93005 ELECTROCARDIOGRAM TRACING: CPT

## 2019-09-30 PROCEDURE — 80053 COMPREHEN METABOLIC PANEL: CPT

## 2019-09-30 PROCEDURE — 74177 CT ABD & PELVIS W/CONTRAST: CPT

## 2019-09-30 PROCEDURE — 76705 ECHO EXAM OF ABDOMEN: CPT

## 2019-09-30 PROCEDURE — 85025 COMPLETE CBC W/AUTO DIFF WBC: CPT

## 2019-09-30 PROCEDURE — 74011250636 HC RX REV CODE- 250/636: Performed by: EMERGENCY MEDICINE

## 2019-09-30 PROCEDURE — 96374 THER/PROPH/DIAG INJ IV PUSH: CPT

## 2019-09-30 PROCEDURE — 81001 URINALYSIS AUTO W/SCOPE: CPT

## 2019-09-30 PROCEDURE — 74011250636 HC RX REV CODE- 250/636: Performed by: STUDENT IN AN ORGANIZED HEALTH CARE EDUCATION/TRAINING PROGRAM

## 2019-09-30 PROCEDURE — 96361 HYDRATE IV INFUSION ADD-ON: CPT

## 2019-09-30 PROCEDURE — 83690 ASSAY OF LIPASE: CPT

## 2019-09-30 PROCEDURE — A9537 TC99M MEBROFENIN: HCPCS

## 2019-09-30 PROCEDURE — 81025 URINE PREGNANCY TEST: CPT

## 2019-09-30 PROCEDURE — C9113 INJ PANTOPRAZOLE SODIUM, VIA: HCPCS | Performed by: EMERGENCY MEDICINE

## 2019-09-30 PROCEDURE — 96375 TX/PRO/DX INJ NEW DRUG ADDON: CPT

## 2019-09-30 PROCEDURE — 99218 HC RM OBSERVATION: CPT

## 2019-09-30 PROCEDURE — 99285 EMERGENCY DEPT VISIT HI MDM: CPT

## 2019-09-30 PROCEDURE — 74011250636 HC RX REV CODE- 250/636

## 2019-09-30 PROCEDURE — 80307 DRUG TEST PRSMV CHEM ANLYZR: CPT

## 2019-09-30 RX ORDER — ONDANSETRON 2 MG/ML
INJECTION INTRAMUSCULAR; INTRAVENOUS
Status: COMPLETED
Start: 2019-09-30 | End: 2019-09-30

## 2019-09-30 RX ORDER — SODIUM CHLORIDE 0.9 % (FLUSH) 0.9 %
5-40 SYRINGE (ML) INJECTION AS NEEDED
Status: DISCONTINUED | OUTPATIENT
Start: 2019-09-30 | End: 2019-10-03 | Stop reason: HOSPADM

## 2019-09-30 RX ORDER — SODIUM CHLORIDE 0.9 % (FLUSH) 0.9 %
5-40 SYRINGE (ML) INJECTION EVERY 8 HOURS
Status: DISCONTINUED | OUTPATIENT
Start: 2019-09-30 | End: 2019-10-03 | Stop reason: HOSPADM

## 2019-09-30 RX ORDER — OLANZAPINE 5 MG/1
15 TABLET ORAL
Status: DISCONTINUED | OUTPATIENT
Start: 2019-09-30 | End: 2019-10-03 | Stop reason: HOSPADM

## 2019-09-30 RX ORDER — MORPHINE SULFATE 4 MG/ML
2 INJECTION INTRAVENOUS
Status: DISCONTINUED | OUTPATIENT
Start: 2019-09-30 | End: 2019-10-01

## 2019-09-30 RX ORDER — HYDROCODONE BITARTRATE AND ACETAMINOPHEN 5; 325 MG/1; MG/1
1 TABLET ORAL
COMMUNITY
End: 2020-12-23 | Stop reason: ALTCHOICE

## 2019-09-30 RX ORDER — POTASSIUM CHLORIDE 1.5 G/1.77G
20 POWDER, FOR SOLUTION ORAL
Status: DISCONTINUED | OUTPATIENT
Start: 2019-09-30 | End: 2019-09-30

## 2019-09-30 RX ORDER — SODIUM CHLORIDE 9 MG/ML
100 INJECTION, SOLUTION INTRAVENOUS CONTINUOUS
Status: DISCONTINUED | OUTPATIENT
Start: 2019-09-30 | End: 2019-10-03 | Stop reason: HOSPADM

## 2019-09-30 RX ORDER — OXCARBAZEPINE 600 MG/1
600 TABLET, FILM COATED ORAL 2 TIMES DAILY
COMMUNITY
End: 2020-12-23

## 2019-09-30 RX ORDER — ONDANSETRON 2 MG/ML
4 INJECTION INTRAMUSCULAR; INTRAVENOUS
Status: COMPLETED | OUTPATIENT
Start: 2019-09-30 | End: 2019-09-30

## 2019-09-30 RX ORDER — NAPROXEN SODIUM 220 MG
440 TABLET ORAL
COMMUNITY
End: 2019-09-30

## 2019-09-30 RX ORDER — POTASSIUM CHLORIDE 750 MG/1
20 TABLET, FILM COATED, EXTENDED RELEASE ORAL
Status: DISCONTINUED | OUTPATIENT
Start: 2019-09-30 | End: 2019-09-30

## 2019-09-30 RX ORDER — ACETAMINOPHEN 500 MG
1000 TABLET ORAL
COMMUNITY
End: 2021-06-20

## 2019-09-30 RX ORDER — KETOROLAC TROMETHAMINE 30 MG/ML
15 INJECTION, SOLUTION INTRAMUSCULAR; INTRAVENOUS
Status: COMPLETED | OUTPATIENT
Start: 2019-09-30 | End: 2019-09-30

## 2019-09-30 RX ORDER — CEPHALEXIN 500 MG/1
500 CAPSULE ORAL 2 TIMES DAILY
COMMUNITY
Start: 2019-09-30 | End: 2019-10-04

## 2019-09-30 RX ORDER — OXCARBAZEPINE 300 MG/1
600 TABLET, FILM COATED ORAL 2 TIMES DAILY
Status: DISCONTINUED | OUTPATIENT
Start: 2019-09-30 | End: 2019-10-03 | Stop reason: HOSPADM

## 2019-09-30 RX ORDER — POTASSIUM CHLORIDE 750 MG/1
10 TABLET, FILM COATED, EXTENDED RELEASE ORAL
Status: COMPLETED | OUTPATIENT
Start: 2019-09-30 | End: 2019-09-30

## 2019-09-30 RX ORDER — IBUPROFEN 200 MG
1 TABLET ORAL EVERY 24 HOURS
Status: DISCONTINUED | OUTPATIENT
Start: 2019-09-30 | End: 2019-10-03 | Stop reason: HOSPADM

## 2019-09-30 RX ORDER — PANTOPRAZOLE SODIUM 40 MG/10ML
40 INJECTION, POWDER, LYOPHILIZED, FOR SOLUTION INTRAVENOUS
Status: COMPLETED | OUTPATIENT
Start: 2019-09-30 | End: 2019-09-30

## 2019-09-30 RX ORDER — PROMETHAZINE HYDROCHLORIDE 25 MG/1
25 TABLET ORAL
COMMUNITY
End: 2020-12-23 | Stop reason: ALTCHOICE

## 2019-09-30 RX ORDER — CLONAZEPAM 1 MG/1
1 TABLET ORAL
Status: DISCONTINUED | OUTPATIENT
Start: 2019-09-30 | End: 2019-10-03 | Stop reason: HOSPADM

## 2019-09-30 RX ORDER — ONDANSETRON 4 MG/1
4 TABLET, ORALLY DISINTEGRATING ORAL
COMMUNITY
End: 2020-12-23 | Stop reason: ALTCHOICE

## 2019-09-30 RX ORDER — MORPHINE SULFATE 4 MG/ML
4 INJECTION INTRAVENOUS
Status: COMPLETED | OUTPATIENT
Start: 2019-09-30 | End: 2019-09-30

## 2019-09-30 RX ADMIN — IOPAMIDOL 100 ML: 755 INJECTION, SOLUTION INTRAVENOUS at 13:39

## 2019-09-30 RX ADMIN — OXCARBAZEPINE 600 MG: 300 TABLET, FILM COATED ORAL at 19:34

## 2019-09-30 RX ADMIN — SODIUM CHLORIDE 100 ML/HR: 900 INJECTION, SOLUTION INTRAVENOUS at 16:34

## 2019-09-30 RX ADMIN — MORPHINE SULFATE 2 MG: 4 INJECTION, SOLUTION INTRAMUSCULAR; INTRAVENOUS at 23:24

## 2019-09-30 RX ADMIN — MORPHINE SULFATE 4 MG: 4 INJECTION, SOLUTION INTRAMUSCULAR; INTRAVENOUS at 14:27

## 2019-09-30 RX ADMIN — KETOROLAC TROMETHAMINE 15 MG: 30 INJECTION, SOLUTION INTRAMUSCULAR at 13:29

## 2019-09-30 RX ADMIN — ONDANSETRON 4 MG: 2 INJECTION INTRAMUSCULAR; INTRAVENOUS at 13:28

## 2019-09-30 RX ADMIN — ONDANSETRON 4 MG: 2 INJECTION INTRAMUSCULAR; INTRAVENOUS at 20:52

## 2019-09-30 RX ADMIN — SODIUM CHLORIDE 100 ML/HR: 900 INJECTION, SOLUTION INTRAVENOUS at 21:57

## 2019-09-30 RX ADMIN — PANTOPRAZOLE SODIUM 40 MG: 40 INJECTION, POWDER, FOR SOLUTION INTRAVENOUS at 11:49

## 2019-09-30 RX ADMIN — ONDANSETRON 4 MG: 2 INJECTION INTRAMUSCULAR; INTRAVENOUS at 16:35

## 2019-09-30 RX ADMIN — MORPHINE SULFATE 2 MG: 4 INJECTION, SOLUTION INTRAMUSCULAR; INTRAVENOUS at 19:35

## 2019-09-30 RX ADMIN — Medication 10 ML: at 21:57

## 2019-09-30 RX ADMIN — SODIUM CHLORIDE 1000 ML: 900 INJECTION, SOLUTION INTRAVENOUS at 11:52

## 2019-09-30 RX ADMIN — POTASSIUM CHLORIDE 10 MEQ: 750 TABLET, FILM COATED, EXTENDED RELEASE ORAL at 19:33

## 2019-09-30 RX ADMIN — ONDANSETRON 4 MG: 2 INJECTION INTRAMUSCULAR; INTRAVENOUS at 11:51

## 2019-09-30 RX ADMIN — KETOROLAC TROMETHAMINE 15 MG: 30 INJECTION, SOLUTION INTRAMUSCULAR at 11:51

## 2019-09-30 RX ADMIN — OLANZAPINE 15 MG: 5 TABLET, FILM COATED ORAL at 21:57

## 2019-09-30 NOTE — PROGRESS NOTES
[facility]: MED RECONCILIATION    Comments/Recommendations:   Patient is awake, alert, and knowledgeable about home medications   Verifies allergies and preferred pharmacy listed. Pharmacist reviewed prescription refill history with Rx Query. Some pill bottles were present for review. The patient will not take her own medications while in the hospital and she will have someone take the medications home. Medications added:     Hydrocodone/apap  Acetaminophen      Medications removed:    Amitriptyline  Tizanidine  Valacyclovir    Medications adjusted:    Ketorolac  oxcarbazepine    Allergies: Latex; Haloperidol; Hydrocodone; Monistat 1 (tioconazole) [tioconazole]; and Sulfa (sulfonamide antibiotics)    Prior to Admission Medicatiom    Prior to Admission Medications   Prescriptions Last Dose Informant Patient Reported? Taking? HYDROcodone-acetaminophen (NORCO) 5-325 mg per tablet 9/30/2019 at 5am Self Yes Yes   Sig: Take 1 Tab by mouth every four (4) hours as needed for Pain. OLANZapine (ZYPREXA) 10 mg tablet 9/26/2019 Self Yes Yes   Sig: Take 15 mg by mouth nightly. OXcarbazepine (TRILEPTAL) 600 mg tablet 9/26/2019 Self Yes Yes   Sig: Take 600 mg by mouth two (2) times a day. acetaminophen (TYLENOL) 500 mg tablet 9/30/2019 at 830am Self Yes Yes   Sig: Take 1,000 mg by mouth every eight (8) hours as needed for Pain. cephALEXin (KEFLEX) 500 mg capsule  Self Yes Yes   Sig: Take 500 mg by mouth two (2) times a day. Indications: UTI   clonazePAM (KLONOPIN) 1 mg tablet 9/26/2019 Self Yes Yes   Sig: Take 1 Tab by mouth daily. ketorolac tromethamine (TORADOL PO)  Self Yes Yes   Sig: Take 10 mg by mouth every six (6) hours as needed for Pain. ondansetron (ZOFRAN ODT) 4 mg disintegrating tablet 9/30/2019 at 8am Self Yes Yes   Sig: Take 4 mg by mouth every eight (8) hours as needed for Nausea.    promethazine (PHENERGAN) 25 mg tablet 9/30/2019 at 4am Self Yes Yes   Sig: Take 25 mg by mouth every six (6) hours as needed for Nausea.       Facility-Administered Medications: None      Thank you,      Grace Frankel, PharmD, BCPS
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
## 2019-09-30 NOTE — ED NOTES
Spoke with nuclear medicine department. Was advised to not give any more morphine as it can affect the HIDA scan results.     HIDA scan is planned for 1730

## 2019-09-30 NOTE — PROGRESS NOTES
Identified pt with two pt identifiers(name and ). Chief Complaint   Patient presents with    Vomiting     and has not eaten since yesterday when she had 3-4 french fries    Back Pain     mid right abdomen and back  - was dx with gallbladder disease on 19 at ER and given referral but appt is not until November - was seen again last night ni ER and they were going to admit her until she told them she had an appt here today at which time they told her to come see her PCP for managment of condition        Health Maintenance Due   Topic    HPV Age 9Y-34Y (1 - Female 3-dose series)    DTaP/Tdap/Td series (1 - Tdap)    PAP AKA CERVICAL CYTOLOGY     Influenza Age 5 to Adult        Wt Readings from Last 3 Encounters:   19 138 lb (62.6 kg)   18 130 lb (59 kg)   18 129 lb (58.5 kg)     Temp Readings from Last 3 Encounters:   19 98.4 °F (36.9 °C) (Oral)   18 97.7 °F (36.5 °C) (Oral)   18 97.9 °F (36.6 °C) (Oral)     BP Readings from Last 3 Encounters:   19 112/83   18 108/62   18 113/78     Pulse Readings from Last 3 Encounters:   19 (!) 59   18 94   18 86         Learning Assessment:  :     Learning Assessment 2015   PRIMARY LEARNER Patient   HIGHEST LEVEL OF EDUCATION - PRIMARY LEARNER  SOME COLLEGE   BARRIERS PRIMARY LEARNER NONE   CO-LEARNER CAREGIVER No   PRIMARY LANGUAGE ENGLISH   LEARNER PREFERENCE PRIMARY DEMONSTRATION     LISTENING   ANSWERED BY patient   RELATIONSHIP SELF       Depression Screening:  :     3 most recent PHQ Screens 2019   Little interest or pleasure in doing things Not at all   Feeling down, depressed, irritable, or hopeless Not at all   Total Score PHQ 2 0       Fall Risk Assessment:  :     No flowsheet data found. Abuse Screening:  :     Abuse Screening Questionnaire 2019   Do you ever feel afraid of your partner?  N N   Are you in a relationship with someone who physically or mentally threatens you? N N   Is it safe for you to go home? Y Y       Coordination of Care Questionnaire:  :     1) Have you been to an emergency room, urgent care clinic since your last visit? no   Hospitalized since your last visit? no             2) Have you seen or consulted any other health care providers outside of [address] [place] [address] since your last visit? no  (Include any pap smears or colon screenings in this section.)    3) Do you have an Advance Directive on file? no  Are you interested in receiving information about Advance Directives? no      Reviewed record in preparation for visit and have obtained necessary documentation. Medication reconciliation up to date and corrected with patient at this time.

## 2019-09-30 NOTE — ED NOTES
Unable to give scheduled dose of oral potassium as NPO. Will ask physician to change the time and the dose from the powder to pills.

## 2019-09-30 NOTE — ED NOTES
Bedside and Verbal shift change report given to Margarita Good (oncoming nurse) by Griselda Avendaño (offgoing nurse). Report included the following information SBAR, Kardex, MAR and Recent Results.
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
## 2019-09-30 NOTE — PROGRESS NOTES
HISTORY OF PRESENT ILLNESS  Zoila Verdin is a 21 y.o. female. HPI   Pt presents with \"abdominal pain\"  Pt states that she has been dealing with nausea, vomiting and abdominal pain for 4 days. She went to [facility] ER last night, and was told that she has a UTI and was placed on Keflex. They also did an US of her abdomen, and said \"she did not have gallstones but said her gallbladder is hannah too much, and she may need surgery\". She has an appointment with GI on nov 8th. Pain continues on right upper abdominal pain  Nausea and vomiting  She states that she has not been able to keep food down for 4 days  Review of Systems   Constitutional: Negative for fever. HENT: Negative for congestion. Gastrointestinal: Positive for abdominal pain, nausea and vomiting. Genitourinary: Negative for dysuria. Physical Exam   Constitutional: She is oriented to person, place, and time. She appears well-developed and well-nourished. HENT:   Head: Normocephalic and atraumatic. Cardiovascular: Normal rate, regular rhythm and normal heart sounds. Pulmonary/Chest: Effort normal and breath sounds normal.   Abdominal: Soft. Normal appearance and bowel sounds are normal. There is tenderness in the right upper quadrant. There is no rebound and no CVA tenderness. Neurological: She is alert and oriented to person, place, and time. Skin: Skin is warm and dry. Psychiatric: She has a normal mood and affect. Her behavior is normal.       ASSESSMENT and PLAN    ICD-10-CM ICD-9-CM    1. Right upper quadrant abdominal pain R10.11 789.01 REFERRAL TO GASTROENTEROLOGY   2. Nausea and vomiting, intractability of vomiting not specified, unspecified vomiting type R11.2 787.01 REFERRAL TO GASTROENTEROLOGY     Educated that she needs to be seen by GI, for assessment.   Educated about having our  call for hopefully a sooner appointment, on the way out of the office  Should she need immediate assessment, worsening of pain, vomiting, etc, should go to ER for eval    Pt informed to return to office with worsening of symptoms, or PRN with any questions or concerns. Pt verbalizes understanding of plan of care and denies further questions or concerns at this time.

## 2019-09-30 NOTE — ED NOTES
Patient calling out to request her pain and nausea medication     Patient cursing at RN assessing pain and reviewing medication orders, advised patient I would notify her primary RN as the patient did not want to discuss with this RN, Primary RN made aware

## 2019-09-30 NOTE — PROGRESS NOTES
[address] [facility] [address] [place] [address]   Office [phone]  Fax [phone]          Assessment and Plan     Dmitriy Shahid is a 21 y.o. female with a PMH asthma, PID, Endometriosis, fibromyalgia, Depression/Anxiety vs Bipolar Disorder, who is admitted for Acute on Chronic Abdominal pain. She has spent 1 night(s) in the hospital.    24 Hour Events: HR noted to be in the 30-40s overnight after receiving morphine. Patient was asymptomatic and EKG showed sinus bradycardia. Morphine was discontinued. Patient continues to complain of nausea and vomiting. Last episode of vomiting at 3am. Patient is currently NPO for possible procedures today. Acute on Chronic Abdominal Pain w/ intractable N/V: 2/2 Biliary Dyskinesia vs Cyclic Vomiting 2/2 to Marijuana vs IBS. Constant 4 days/Intermittent x4 yrs w/ bilious vomiting. U/S abd gallbladder sludge. CT Abd neg. UDS +opiate, THC.  - MIVF NS at 100mL/hr  - IV Zofran 4mg and IV compazine 10mg q6H prn  - Holding IV morphine prn d/t sinus bradycardia  - Daily labs (CBC, CMP)  - Prelim HIDA normal. f/u final report. - Surg recs: no surgical intervention at this time  - GI recs: EGD today    Sinus bradycardia: asymptomatic. HR 40-50s. Baseline HR . EKG shows sinus bradycardia. UDS +opiates, THC  - Continue cardiac monitoring  - Continue MIVF  - Hold morphine    Elevated Blood Pressure: stable. POA /83. Likely 2/2 to pain. No Hx of HTN. - Continue to monitor BP     Psychiatric Hx (Bipolar Disorder vs MDD/Anxiety):    - Continue home Zyprexa 15mg nightly, Trileptal 600mg BID  - Clonazepam 1mg prn     Nicotine Dependence: 2 packs per week for 7 yrs  - Nicotine patch   - Encourage smoking cessation     FEN/GI - CLD. NPO after midnight. MIVF at 100 mL/hr.    Activity - As tolerated  DVT prophylaxis - SCDs  GI prophylaxis - None for now   Disposition - Plan to d/c to home      CODE STATUS:  Partial (No intubation)     [employer], DO  Family Medicine Resident         Subjective / Objective     Subjective   Patient continues to complain of nausea and vomiting. Reports no improvement. Temp (24hrs), Av.9 °F (36.6 °C), Min:97.5 °F (36.4 °C), Max:98.5 °F (36.9 °C)     Objective  Respiratory:   O2 Device: Room air   Visit Vitals  /68 (BP 1 Location: Left arm, BP Patient Position: At rest)   Pulse (!) 43   Temp 97.5 °F (36.4 °C)   Resp 14   Ht 5' 2\" (1.575 m)   Wt 138 lb (62.6 kg)   SpO2 96%   BMI 25.24 kg/m²       General: Alert. Cooperative. Appears uncomfortable. Head: Normocephalic. Atraumatic. Respiratory: CTAB. No w/r/r/c.   Cardiovascular: RRR. Normal S1,S2. No m/r/g.    GI: No rebound tenderness or guarding. Nondistended. +RUQ TTP   Extremities: Distal pulses in tact   Skin: Warm, dry. No rashes.     Neuro: AAOx4       I/O:  Date 19 - 10/01/19 0659 10/01/19 0700 - 10/02/19 0659   Shift 4161-9134 6170-6122 24 Hour Total 7283-9528 2919-9394 24 Hour Total   INTAKE   I.V.(mL/kg/hr) 1000(1.3)  1000(0.7)        Volume (sodium chloride 0.9 % bolus infusion 1,000 mL) 1000  1000      Shift Total(mL/kg) 1000(16)  1000(16)      OUTPUT   Urine(mL/kg/hr)  300(0.4) 300(0.2)        Urine Voided  300 300      Shift Total(mL/kg)  300(4.8) 300(4.8)      NET 1000 -300 700      Weight (kg) 62.6 62.6 62.6 62.6 62.6 62.6       CBC:  Recent Labs     10/01/19  0209 09/30/19  1118   WBC 5.1 6.2   HGB 10.5* 11.3*   HCT 33.9* 35.3    008       Metabolic Panel:  Recent Labs     10/01/19  02019  1118    140   K 3.8 3.4*   * 108   CO2 24 25   BUN 6 10   CREA 0.60 0.84   GLU 79 127*   CA 8.1* 8.4*   MG 1.8  --    PHOS 2.9  --    ALB 3.4* 3.9   SGOT 13* 17   ALT 14 18          For Billing    Chief Complaint   Patient presents with    Abdominal Pain    Nausea    Vomiting       Hospital Problems  Date Reviewed: 2019          Codes Class Noted POA    * (Principal) Abdominal pain ICD-10-CM: R10.9  ICD-9-CM: 789.00  10/4/2017 Unknown

## 2019-09-30 NOTE — PATIENT INSTRUCTIONS
Nausea and Vomiting: Care Instructions  Your Care Instructions    When you are nauseated, you may feel weak and sweaty and notice a lot of saliva in your mouth. Nausea often leads to vomiting. Most of the time you do not need to worry about nausea and vomiting, but they can be signs of other illnesses. Two common causes of nausea and vomiting are stomach flu and food poisoning. Nausea and vomiting from viral stomach flu will usually start to improve within 24 hours. Nausea and vomiting from food poisoning may last from 12 to 48 hours. The doctor has checked you carefully, but problems can develop later. If you notice any problems or new symptoms, get medical treatment right away. Follow-up care is a key part of your treatment and safety. Be sure to make and go to all appointments, and call your doctor if you are having problems. It's also a good idea to know your test results and keep a list of the medicines you take. How can you care for yourself at home? · To prevent dehydration, drink plenty of fluids, enough so that your urine is light yellow or clear like water. Choose water and other caffeine-free clear liquids until you feel better. If you have kidney, heart, or liver disease and have to limit fluids, talk with your doctor before you increase the amount of fluids you drink. · Rest in bed until you feel better. · When you are able to eat, try clear soups, mild foods, and liquids until all symptoms are gone for 12 to 48 hours. Other good choices include dry toast, crackers, cooked cereal, and gelatin dessert, such as Jell-O. When should you call for help? Call 911 anytime you think you may need emergency care. For example, call if:    · You passed out (lost consciousness).    Call your doctor now or seek immediate medical care if:    · You have symptoms of dehydration, such as:  ? Dry eyes and a dry mouth. ? Passing only a little dark urine. ?  Feeling thirstier than usual.     · You have new or worsening belly pain.     · You have a new or higher fever.     · You vomit blood or what looks like coffee grounds.    Watch closely for changes in your health, and be sure to contact your doctor if:    · You have ongoing nausea and vomiting.     · Your vomiting is getting worse.     · Your vomiting lasts longer than 2 days.     · You are not getting better as expected. Where can you learn more? Go to http://kirk-izzy.info/. Enter 25 [id number] in the search box to learn more about \"Nausea and Vomiting: Care Instructions. \"  Current as of: June 26, 2019  Content Version: 12.2  © 6604-6853 [employer]. Care instructions adapted under license by [employer] (which disclaims liability or warranty for this information). If you have questions about a medical condition or this instruction, always ask your healthcare professional. [address] any warranty or liability for your use of this information.
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
## 2019-09-30 NOTE — ED TRIAGE NOTES
Patient presents with 4-day history of abdominal pain, nausea, vomiting, and diarrhea. Seen last night and diagnosed with UTI. Upper abdominal pain is radiating through to back.

## 2019-10-01 ENCOUNTER — ANESTHESIA (OUTPATIENT)
Dept: ENDOSCOPY | Age: 24
End: 2019-10-01
Payer: COMMERCIAL

## 2019-10-01 ENCOUNTER — ANESTHESIA EVENT (OUTPATIENT)
Dept: ENDOSCOPY | Age: 24
End: 2019-10-01
Payer: COMMERCIAL

## 2019-10-01 PROBLEM — R00.1 SINUS BRADYCARDIA: Status: ACTIVE | Noted: 2019-10-01

## 2019-10-01 LAB
ALBUMIN SERPL-MCNC: 3.4 G/DL (ref 3.5–5)
ALBUMIN/GLOB SERPL: 1.3 {RATIO} (ref 1.1–2.2)
ALP SERPL-CCNC: 68 U/L (ref 45–117)
ALT SERPL-CCNC: 14 U/L (ref 12–78)
ANION GAP SERPL CALC-SCNC: 7 MMOL/L (ref 5–15)
AST SERPL-CCNC: 13 U/L (ref 15–37)
ATRIAL RATE: 38 BPM
ATRIAL RATE: 47 BPM
BASOPHILS # BLD: 0 K/UL (ref 0–0.1)
BASOPHILS NFR BLD: 0 % (ref 0–1)
BILIRUB SERPL-MCNC: 0.4 MG/DL (ref 0.2–1)
BUN SERPL-MCNC: 6 MG/DL (ref 6–20)
BUN/CREAT SERPL: 10 (ref 12–20)
CALCIUM SERPL-MCNC: 8.1 MG/DL (ref 8.5–10.1)
CALCULATED P AXIS, ECG09: 1 DEGREES
CALCULATED P AXIS, ECG09: 40 DEGREES
CALCULATED R AXIS, ECG10: 46 DEGREES
CALCULATED R AXIS, ECG10: 51 DEGREES
CALCULATED T AXIS, ECG11: 57 DEGREES
CALCULATED T AXIS, ECG11: 67 DEGREES
CHLORIDE SERPL-SCNC: 111 MMOL/L (ref 97–108)
CO2 SERPL-SCNC: 24 MMOL/L (ref 21–32)
CREAT SERPL-MCNC: 0.6 MG/DL (ref 0.55–1.02)
DIAGNOSIS, 93000: NORMAL
DIAGNOSIS, 93000: NORMAL
DIFFERENTIAL METHOD BLD: ABNORMAL
EOSINOPHIL # BLD: 0 K/UL (ref 0–0.4)
EOSINOPHIL NFR BLD: 1 % (ref 0–7)
ERYTHROCYTE [DISTWIDTH] IN BLOOD BY AUTOMATED COUNT: 14.7 % (ref 11.5–14.5)
GLOBULIN SER CALC-MCNC: 2.7 G/DL (ref 2–4)
GLUCOSE SERPL-MCNC: 79 MG/DL (ref 65–100)
H PYLORI FROM TISSUE: NEGATIVE
HCT VFR BLD AUTO: 33.9 % (ref 35–47)
HGB BLD-MCNC: 10.5 G/DL (ref 11.5–16)
IMM GRANULOCYTES # BLD AUTO: 0 K/UL (ref 0–0.04)
IMM GRANULOCYTES NFR BLD AUTO: 0 % (ref 0–0.5)
KIT LOT NO., HCLOLOT: NORMAL
LYMPHOCYTES # BLD: 2 K/UL (ref 0.8–3.5)
LYMPHOCYTES NFR BLD: 40 % (ref 12–49)
MAGNESIUM SERPL-MCNC: 1.8 MG/DL (ref 1.6–2.4)
MCH RBC QN AUTO: 25.2 PG (ref 26–34)
MCHC RBC AUTO-ENTMCNC: 31 G/DL (ref 30–36.5)
MCV RBC AUTO: 81.5 FL (ref 80–99)
MONOCYTES # BLD: 0.2 K/UL (ref 0–1)
MONOCYTES NFR BLD: 4 % (ref 5–13)
NEGATIVE CONTROL: NEGATIVE
NEUTS SEG # BLD: 2.8 K/UL (ref 1.8–8)
NEUTS SEG NFR BLD: 55 % (ref 32–75)
NRBC # BLD: 0 K/UL (ref 0–0.01)
NRBC BLD-RTO: 0 PER 100 WBC
P-R INTERVAL, ECG05: 128 MS
P-R INTERVAL, ECG05: 138 MS
PHOSPHATE SERPL-MCNC: 2.9 MG/DL (ref 2.6–4.7)
PLATELET # BLD AUTO: 201 K/UL (ref 150–400)
PMV BLD AUTO: 11.1 FL (ref 8.9–12.9)
POSITIVE CONTROL: POSITIVE
POTASSIUM SERPL-SCNC: 3.8 MMOL/L (ref 3.5–5.1)
PROT SERPL-MCNC: 6.1 G/DL (ref 6.4–8.2)
Q-T INTERVAL, ECG07: 454 MS
Q-T INTERVAL, ECG07: 494 MS
QRS DURATION, ECG06: 78 MS
QRS DURATION, ECG06: 78 MS
QTC CALCULATION (BEZET), ECG08: 392 MS
QTC CALCULATION (BEZET), ECG08: 401 MS
RBC # BLD AUTO: 4.16 M/UL (ref 3.8–5.2)
SODIUM SERPL-SCNC: 142 MMOL/L (ref 136–145)
VENTRICULAR RATE, ECG03: 38 BPM
VENTRICULAR RATE, ECG03: 47 BPM
WBC # BLD AUTO: 5.1 K/UL (ref 3.6–11)

## 2019-10-01 PROCEDURE — 87077 CULTURE AEROBIC IDENTIFY: CPT | Performed by: INTERNAL MEDICINE

## 2019-10-01 PROCEDURE — 74011250637 HC RX REV CODE- 250/637: Performed by: STUDENT IN AN ORGANIZED HEALTH CARE EDUCATION/TRAINING PROGRAM

## 2019-10-01 PROCEDURE — 96361 HYDRATE IV INFUSION ADD-ON: CPT

## 2019-10-01 PROCEDURE — 76060000031 HC ANESTHESIA FIRST 0.5 HR: Performed by: INTERNAL MEDICINE

## 2019-10-01 PROCEDURE — 80053 COMPREHEN METABOLIC PANEL: CPT

## 2019-10-01 PROCEDURE — 93005 ELECTROCARDIOGRAM TRACING: CPT

## 2019-10-01 PROCEDURE — 74011250637 HC RX REV CODE- 250/637: Performed by: FAMILY MEDICINE

## 2019-10-01 PROCEDURE — 74011250636 HC RX REV CODE- 250/636: Performed by: INTERNAL MEDICINE

## 2019-10-01 PROCEDURE — 74011250636 HC RX REV CODE- 250/636: Performed by: STUDENT IN AN ORGANIZED HEALTH CARE EDUCATION/TRAINING PROGRAM

## 2019-10-01 PROCEDURE — 83735 ASSAY OF MAGNESIUM: CPT

## 2019-10-01 PROCEDURE — 76040000019: Performed by: INTERNAL MEDICINE

## 2019-10-01 PROCEDURE — 74011250636 HC RX REV CODE- 250/636: Performed by: NURSE ANESTHETIST, CERTIFIED REGISTERED

## 2019-10-01 PROCEDURE — 74011250637 HC RX REV CODE- 250/637: Performed by: INTERNAL MEDICINE

## 2019-10-01 PROCEDURE — 85025 COMPLETE CBC W/AUTO DIFF WBC: CPT

## 2019-10-01 PROCEDURE — 99218 HC RM OBSERVATION: CPT

## 2019-10-01 PROCEDURE — 84100 ASSAY OF PHOSPHORUS: CPT

## 2019-10-01 PROCEDURE — 96376 TX/PRO/DX INJ SAME DRUG ADON: CPT

## 2019-10-01 PROCEDURE — 36415 COLL VENOUS BLD VENIPUNCTURE: CPT

## 2019-10-01 PROCEDURE — 74011000250 HC RX REV CODE- 250: Performed by: FAMILY MEDICINE

## 2019-10-01 PROCEDURE — 88305 TISSUE EXAM BY PATHOLOGIST: CPT

## 2019-10-01 PROCEDURE — 77030021593 HC FCPS BIOP ENDOSC BSC -A: Performed by: INTERNAL MEDICINE

## 2019-10-01 RX ORDER — ACETAMINOPHEN 325 MG/1
650 TABLET ORAL
Status: DISCONTINUED | OUTPATIENT
Start: 2019-10-01 | End: 2019-10-03 | Stop reason: HOSPADM

## 2019-10-01 RX ORDER — PANTOPRAZOLE SODIUM 40 MG/1
40 TABLET, DELAYED RELEASE ORAL
Status: DISCONTINUED | OUTPATIENT
Start: 2019-10-02 | End: 2019-10-02

## 2019-10-01 RX ORDER — ACETAMINOPHEN 650 MG/1
650 SUPPOSITORY RECTAL
Status: DISCONTINUED | OUTPATIENT
Start: 2019-10-01 | End: 2019-10-03 | Stop reason: HOSPADM

## 2019-10-01 RX ORDER — DEXTROMETHORPHAN/PSEUDOEPHED 2.5-7.5/.8
1.2 DROPS ORAL
Status: DISCONTINUED | OUTPATIENT
Start: 2019-10-01 | End: 2019-10-01 | Stop reason: HOSPADM

## 2019-10-01 RX ORDER — MIDAZOLAM HYDROCHLORIDE 1 MG/ML
.25-5 INJECTION, SOLUTION INTRAMUSCULAR; INTRAVENOUS
Status: DISCONTINUED | OUTPATIENT
Start: 2019-10-01 | End: 2019-10-01 | Stop reason: HOSPADM

## 2019-10-01 RX ORDER — SODIUM CHLORIDE 9 MG/ML
INJECTION, SOLUTION INTRAVENOUS
Status: DISCONTINUED | OUTPATIENT
Start: 2019-10-01 | End: 2019-10-01 | Stop reason: HOSPADM

## 2019-10-01 RX ORDER — FENTANYL CITRATE 50 UG/ML
100 INJECTION, SOLUTION INTRAMUSCULAR; INTRAVENOUS
Status: DISCONTINUED | OUTPATIENT
Start: 2019-10-01 | End: 2019-10-01 | Stop reason: HOSPADM

## 2019-10-01 RX ORDER — ONDANSETRON 2 MG/ML
4 INJECTION INTRAMUSCULAR; INTRAVENOUS
Status: DISCONTINUED | OUTPATIENT
Start: 2019-10-01 | End: 2019-10-01

## 2019-10-01 RX ORDER — PANTOPRAZOLE SODIUM 40 MG/1
80 TABLET, DELAYED RELEASE ORAL ONCE
Status: COMPLETED | OUTPATIENT
Start: 2019-10-01 | End: 2019-10-01

## 2019-10-01 RX ORDER — PROPOFOL 10 MG/ML
INJECTION, EMULSION INTRAVENOUS AS NEEDED
Status: DISCONTINUED | OUTPATIENT
Start: 2019-10-01 | End: 2019-10-01 | Stop reason: HOSPADM

## 2019-10-01 RX ORDER — EPINEPHRINE 0.1 MG/ML
1 INJECTION INTRACARDIAC; INTRAVENOUS
Status: DISCONTINUED | OUTPATIENT
Start: 2019-10-01 | End: 2019-10-01 | Stop reason: HOSPADM

## 2019-10-01 RX ORDER — PROPOFOL 10 MG/ML
INJECTION, EMULSION INTRAVENOUS
Status: DISCONTINUED | OUTPATIENT
Start: 2019-10-01 | End: 2019-10-01 | Stop reason: HOSPADM

## 2019-10-01 RX ORDER — FLUMAZENIL 0.1 MG/ML
0.2 INJECTION INTRAVENOUS
Status: DISCONTINUED | OUTPATIENT
Start: 2019-10-01 | End: 2019-10-01 | Stop reason: HOSPADM

## 2019-10-01 RX ORDER — ONDANSETRON 2 MG/ML
4 INJECTION INTRAMUSCULAR; INTRAVENOUS EVERY 4 HOURS
Status: DISCONTINUED | OUTPATIENT
Start: 2019-10-01 | End: 2019-10-03 | Stop reason: HOSPADM

## 2019-10-01 RX ORDER — SODIUM CHLORIDE 9 MG/ML
50 INJECTION, SOLUTION INTRAVENOUS CONTINUOUS
Status: DISPENSED | OUTPATIENT
Start: 2019-10-01 | End: 2019-10-01

## 2019-10-01 RX ORDER — ATROPINE SULFATE 0.1 MG/ML
0.5 INJECTION INTRAVENOUS
Status: DISCONTINUED | OUTPATIENT
Start: 2019-10-01 | End: 2019-10-01 | Stop reason: HOSPADM

## 2019-10-01 RX ORDER — PROCHLORPERAZINE EDISYLATE 5 MG/ML
10 INJECTION INTRAMUSCULAR; INTRAVENOUS
Status: DISCONTINUED | OUTPATIENT
Start: 2019-10-01 | End: 2019-10-03 | Stop reason: HOSPADM

## 2019-10-01 RX ORDER — NALOXONE HYDROCHLORIDE 0.4 MG/ML
0.4 INJECTION, SOLUTION INTRAMUSCULAR; INTRAVENOUS; SUBCUTANEOUS
Status: DISCONTINUED | OUTPATIENT
Start: 2019-10-01 | End: 2019-10-01 | Stop reason: HOSPADM

## 2019-10-01 RX ADMIN — ONDANSETRON 4 MG: 2 INJECTION INTRAMUSCULAR; INTRAVENOUS at 03:14

## 2019-10-01 RX ADMIN — OLANZAPINE 15 MG: 5 TABLET, FILM COATED ORAL at 21:12

## 2019-10-01 RX ADMIN — ONDANSETRON 4 MG: 2 INJECTION INTRAMUSCULAR; INTRAVENOUS at 21:04

## 2019-10-01 RX ADMIN — LIDOCAINE HYDROCHLORIDE 40 ML: 20 SOLUTION ORAL; TOPICAL at 20:10

## 2019-10-01 RX ADMIN — SODIUM CHLORIDE 100 ML/HR: 900 INJECTION, SOLUTION INTRAVENOUS at 10:57

## 2019-10-01 RX ADMIN — ONDANSETRON 4 MG: 2 INJECTION INTRAMUSCULAR; INTRAVENOUS at 13:55

## 2019-10-01 RX ADMIN — SODIUM CHLORIDE 50 ML/HR: 900 INJECTION, SOLUTION INTRAVENOUS at 18:37

## 2019-10-01 RX ADMIN — PROPOFOL 200 MG: 10 INJECTION, EMULSION INTRAVENOUS at 16:25

## 2019-10-01 RX ADMIN — SODIUM CHLORIDE: 9 INJECTION, SOLUTION INTRAVENOUS at 16:21

## 2019-10-01 RX ADMIN — PROCHLORPERAZINE EDISYLATE 10 MG: 5 INJECTION INTRAMUSCULAR; INTRAVENOUS at 20:10

## 2019-10-01 RX ADMIN — ONDANSETRON 4 MG: 2 INJECTION INTRAMUSCULAR; INTRAVENOUS at 07:26

## 2019-10-01 RX ADMIN — OXCARBAZEPINE 600 MG: 300 TABLET, FILM COATED ORAL at 06:00

## 2019-10-01 RX ADMIN — PANTOPRAZOLE SODIUM 80 MG: 40 TABLET, DELAYED RELEASE ORAL at 18:37

## 2019-10-01 RX ADMIN — ONDANSETRON 4 MG: 2 INJECTION INTRAMUSCULAR; INTRAVENOUS at 18:28

## 2019-10-01 RX ADMIN — PROPOFOL 120 MCG/KG/MIN: 10 INJECTION, EMULSION INTRAVENOUS at 16:25

## 2019-10-01 RX ADMIN — ACETAMINOPHEN 650 MG: 650 SUPPOSITORY RECTAL at 21:04

## 2019-10-01 RX ADMIN — Medication 10 ML: at 13:56

## 2019-10-01 RX ADMIN — Medication 10 ML: at 22:00

## 2019-10-01 NOTE — ED NOTES
Patient was found crying in pain and had an unfortunate experience with the nurse author in the prior note. Stated that the nurse was rude with a loud voice and dismissive. Emotional support was provided to this patient that has not had the best of days today with pain and nausea.

## 2019-10-01 NOTE — PERIOP NOTES
Yulissa Davis  1995  [id number]    Situation:    Scheduled Procedure: Procedure(s):  ESOPHAGOGASTRODUODENOSCOPY (EGD)  Verbal report received from: Trav Jha RN  Preoperative diagnosis: abdominal pain   persistant vomiting    Background:    Procedure: Procedure(s):  ESOPHAGOGASTRODUODENOSCOPY (EGD)  Physician performing procedure; Dr. Marty Ware RN    NPO Status/Last PO Intake: midnight    Pregnancy Test:Yes If yes, result: negative    Is the patient taking Blood Thinners: NO If yes, list:  and last taken   Is the patient diabetic:no       If yes, what was the last BS:    Time taken? Anything given? no           Does the patient have a Pacemaker/Defibrillator in place?: no   Does the patient need antibiotics before/during/after procedure: no   If the patient is having a colon, How much prep was drank? What were the Colon prep results? Does the patient have SCD in place:no   Is patient on CONTACT precautions:no        If yes, what kind of CONTACT precautions:     Assessment:  Are the vital signs stable prior to patient coming to ENDO?  yes  Is the patient alert/oriented and able to sign consent for the procedures:yes    Does the patient have a patient IV in place?  yes     Recommendation:  Family or Friend present no     Permission to share finding with Family or Friend n/a    EGD scheduled for 1600 today

## 2019-10-01 NOTE — PROGRESS NOTES
2200  Bedside and Verbal shift change report given to [facility] (oncoming nurse) by Brigette Huerta (offgoing nurse). Report included the following information SBAR, Kardex, Procedure Summary, Intake/Output, MAR, Accordion, Recent Results and Cardiac Rhythm Sinus Horris Sheer. Patient on bed. Crying in pain. Complaining of 8/10 pain on the upper back and abdomen. Morphine las given at [facility]. Next due at 2335. Initial assessment done. Scheduled meds given. Patient aware to call before getting out of bed. She's also aware that she's NPO after midnight. Will continue to monitor. 2250  On call MD aware about patient's HR. Ordered to monitor. 0214  Heart rate on 30's. Dr. Erica Soulier ordered EKG. EKG shows Sinus Horris Sheer. Visit Vitals  /76   Pulse (!) 39   Temp 97.6 °F (36.4 °C)   Resp 12   Ht 5' 2\" (1.575 m)   Wt 62.6 kg (138 lb)   LMP 09/13/2019 (Exact Date)   SpO2 94%   BMI 25.24 kg/m²     0700  Bedside and Verbal shift change report given to [facility] (oncoming nurse) by Lindy Hall RN (offgoing nurse). Report included the following information SBAR, Kardex, Procedure Summary, Intake/Output, MAR, Accordion and Recent Results.

## 2019-10-01 NOTE — ROUTINE PROCESS
Pt experiencing R flank pain 6/10. Pt just received zofran but still vomiting. Family practice ordered tylenol PO. Called family practice back to request pain medicine IV. Family practice stated to wait and give compazine then give tylenol.
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
## 2019-10-01 NOTE — ANESTHESIA POSTPROCEDURE EVALUATION
Procedure(s):  ESOPHAGOGASTRODUODENOSCOPY (EGD)  ESOPHAGOGASTRODUODENAL (EGD) BIOPSY. MAC    Anesthesia Post Evaluation      Multimodal analgesia: multimodal analgesia not used between 6 hours prior to anesthesia start to PACU discharge  Patient location during evaluation: PACU  Patient participation: complete - patient participated  Level of consciousness: awake  Pain management: adequate  Airway patency: patent  Anesthetic complications: no  Cardiovascular status: acceptable, blood pressure returned to baseline and hemodynamically stable  Respiratory status: acceptable  Hydration status: acceptable  Post anesthesia nausea and vomiting:  controlled      Vitals Value Taken Time   /69 10/1/2019  4:57 PM   Temp 36.8 °C (98.2 °F) 10/1/2019  4:50 PM   Pulse 56 10/1/2019  5:01 PM   Resp 12 10/1/2019  5:01 PM   SpO2 93 % 10/1/2019  5:01 PM   Vitals shown include unvalidated device data.

## 2019-10-01 NOTE — PROGRESS NOTES
TRANSFER - OUT REPORT:    Verbal report given to [facility] WESTON Pedroza(name) on Jaime Calles  being transferred to [facility](unit) for routine progression of care       Report consisted of patients Situation, Background, Assessment and   Recommendations(SBAR). Information from the following report(s) SBAR was reviewed with the receiving nurse. Lines:   Peripheral IV 09/30/19 Left Forearm (Active)   Site Assessment Clean, dry, & intact 10/1/2019  7:28 AM   Phlebitis Assessment 0 10/1/2019  7:28 AM   Infiltration Assessment 0 10/1/2019  7:28 AM   Dressing Status Clean, dry, & intact 10/1/2019  7:28 AM   Dressing Type Transparent 10/1/2019  7:28 AM   Hub Color/Line Status Pink; Infusing 10/1/2019  7:28 AM   Action Taken Open ports on tubing capped 10/1/2019  3:18 AM   Alcohol Cap Used Yes 10/1/2019  7:28 AM        Opportunity for questions and clarification was provided. Patient transported by transporter in a stable state.

## 2019-10-01 NOTE — PROGRESS NOTES
[facility] Residency  Resident Note in Brief  ----------------------------------------    Patient evaluated at bedside after repeated HR in the 30-40s. EKG Obtained showed sinus bradycardia and patient is asymptomatic with episodes. Patient currently resting comfortably in bed. When patient moves HR increases from mid 30s to mid 45s. Will continue to monitor for symptoms related to bradycardia. Discontinued morphone as may be contributing and patient is currently comfortable in bed. Visit Vitals  BP (!) 114/92   Pulse (!) 35   Temp 97.7 °F (36.5 °C)   Resp 18   Ht 5' 2\" (1.575 m)   Wt 138 lb (62.6 kg)   SpO2 99%   BMI 25.24 kg/m²         Please see full daily progress note for complete plan.   Quinn Bailon MD  2:37 AM

## 2019-10-01 NOTE — PROGRESS NOTES
1928    Pt's  called to say the pt is still complaining of abdominal pain and she wants more clarification on diagnosis and plan. Pt's  states \"dilaudid is what worked for her pain. \" Explained to pt's  that the [facility] resident just went in to see the pt and that the nurses are working on administering the newly ordered medications. Will follow up again with primary nurse. 1932    Primary nurse notified of 's call. 1945    Pt's  called again saying pt is still in pain and wants more clarification on diagnosis. Educated  on caution with narcotic use due to patient's bradycardia, but that there had been additional medications added to control patient's symptoms. Explained to  that this RN can notify Family Practice to call and speak with him further about the plan of care. Pt's  agreeable to this. Akosua Pederson notified of 's multiple phone calls this evening regarding plan of care and pt's continued pain. Requested that they call  to clarify. Family practice to call  as soon as available. 1951    This RN called pt's  Jas Orozco to inform him that [facility] will give him a call to update him as soon as available.  expressed that he is thankful for the help/follow up.

## 2019-10-01 NOTE — PROGRESS NOTES
[address] Medicine Residency  Resident Note in Brief  ----------------------------------------    S:  Checked on patient postoperatively (after EGD). She states she was told she has a hiatal hernia and that her gallbladder had sludge in it. Pt c/o epigastric pain radiating through to her back/R flank and continued N/V. States pain is a burning sensation, sharp, stabbing, currently 8/10. Was 3/10 when she awoke from anesthesia. She is hungry but feels afraid to eat. Denies distention or gas. Last BM 4 days ago but states she has not had solid food in that time. No hx constipation. Denies dysuria or changes in urinary frequency/urgency    O:  Visit Vitals  /59 (BP 1 Location: Right arm)   Pulse (!) 44   Temp 97.6 °F (36.4 °C)   Resp 16   Ht 5' 2\" (1.575 m)   Wt 138 lb (62.6 kg)   SpO2 99%   BMI 25.24 kg/m²     Gen: sitting up in bed, with green emesis bag, mild distress, tearful  Resp: CTAB, good air movement. No inspiratory crackles, no wheezing, no rhonchi  Cardio: regular rate, normal rhythm  Abd: BS present. Abdomen soft, with tenderness over epigastric and RUQ region. +R flank tenderness she feels originates from abdomen. No RLQ/LLQ tenderness. A/P  22 yo F admitted for intractable N/V   - s/p EGD showing hiatal hernia and gastritis. Biopsies of esophagus and stomach pending. H. Pylori negative. - scheduled zofran with PRN compazine, consider scheduling compazine as well if she continues to be nauseous  - Protonix ordered for 80mg tonight (given 30 mins ago) and then 40mg BID (per GI). Monitor for effect.   - PRN tylenol, PO or rectal if vomiting  - Lipase negative yesterday AM and CT Abd/Pelvis without signs of pancreatitis  - Trial GI cocktail   - Trial PO diet per GI     Please see full daily progress note for complete plan.   Leslie Cabrera MD  7:04 PM

## 2019-10-01 NOTE — PROGRESS NOTES
9/30/2019  9:36 PM    Reason for Admission:   Abdominal pain                   RRAT Score:        8             Plan for utilizing home health:     Not at this time; no history of it; open to medical recommendations                     Current Advanced Directive/Advance Care Plan:  CM provided education and blank copy of state AD to patient                         Transition of Care Plan:         EMR reviewed, and patient acknowledged she lives with her , Cameron Solis ([phone]), and 1yo son in a 1 level home with 3 entry steps. She denies a history of [address] and identified her preferred pharmacy is [facility]. CM provided education on OBS status and provided patient with a copy of the OBS letter after filing original to be scanned into chart. CM will alert NN of admission. 1. Patient will be admitted under observation  2. PT consult pending  3. CM will follow though discharge and will alert NN at time of d/c            Care Management Interventions  PCP Verified by CM: Yes(Dr. Jose Marcelo; Yes NN)  Last Visit to PCP: 09/30/19  Mode of Transport at Discharge:  Other (see comment)(; personal vehicle)  Physical Therapy Consult: Yes  Occupational Therapy Consult: No  Current Support Network: Lives with Spouse, Own Home  Confirm Follow Up Transport: Family  Discharge Location  Discharge Placement: Home

## 2019-10-01 NOTE — PROCEDURES
Felipe Brown M.D. 2019    Esophagogastroduodenoscopy (EGD) Procedure Note  Anahy Feldman  : 1995  Nathanael Maier Medical Record Number: [id number]      Indications:    Vomiting, persitent of unclear etilogy  Referring Physician:  Sammy Way MD  Anesthesia/Sedation: Conscious Sedation/Moderate Sedation  Endoscopist:  Dr. Kristina Agudelo  Assistants: None  Permit:  The indications, risks, benefits and alternatives were reviewed with the patient or their decision maker who was provided an opportunity to ask questions and all questions were answered. The specific risks of esophagogastroduodenoscopy with conscious sedation were reviewed, including but not limited to anesthetic complication, bleeding, adverse drug reaction, missed lesion, infection, IV site reactions, and intestinal perforation which would lead to the need for surgical repair. Alternatives to EGD including radiographic imaging, observation without testing, or laboratory testing were reviewed as well as the limitations of those alternatives discussed. After considering the options and having all their questions answered, the patient or their decision maker provided both verbal and written consent to proceed. Procedure in Detail:  After obtaining informed consent, positioning of the patient in the left lateral decubitus position, and conduction of a pre-procedure pause or \"time out\" the endoscope was introduced into the mouth and advanced to the duodenum. A careful inspection was made, and findings or interventions are described below.     Findings:   Esophagus:normal  Stomach: reducible hiatal hernia 33-36 cm and streaks mild erythema and shallow linear erosions  Duodenum/jejunum: normal    Complications/estimated blood loss: none    Therapies:  biopsy of esophagus  biopsy of stomach body    Specimens: biopsies    Implants:none Recommendations:  -twice daily pantoprzole  Attempt diet    Thank you for entrusting me with this patient's care. Please do not hesitate to contact me with any questions or if I can be of assistance with any of your other patients' GI needs.   Signed By: Anand Khan MD                        October 1, 2019

## 2019-10-01 NOTE — ANESTHESIA PREPROCEDURE EVALUATION
Relevant Problems   No relevant active problems       Anesthetic History   No history of anesthetic complications  PONV          Review of Systems / Medical History  Patient summary reviewed, nursing notes reviewed and pertinent labs reviewed    Pulmonary  Within defined limits          Asthma        Neuro/Psych   Within defined limits      Psychiatric history     Cardiovascular  Within defined limits                Exercise tolerance: >4 METS     GI/Hepatic/Renal  Within defined limits              Endo/Other  Within defined limits           Other Findings   Comments: Bipolar, schizoaffective disorder  Panic attacks,  fibromyalgia           Physical Exam    Airway  Mallampati: II    Neck ROM: normal range of motion   Mouth opening: Normal     Cardiovascular  Regular rate and rhythm,  S1 and S2 normal,  no murmur, click, rub, or gallop  Rhythm: regular  Rate: normal         Dental  No notable dental hx       Pulmonary  Breath sounds clear to auscultation               Abdominal  GI exam deferred       Other Findings            Anesthetic Plan    ASA: 3  Anesthesia type: MAC          Induction: Intravenous  Anesthetic plan and risks discussed with: Patient

## 2019-10-01 NOTE — PROGRESS NOTES
0940: Family med MD at bedside, made aware patient is continuing to complain of pain. HR has maintained in 40's all night. Patient currently asleep, instructed to call MD if complains of pain when she wakes up and they will look at ordering oral pain medication. 1400: TRANSFER - OUT REPORT:    Verbal report given to [facility] Kasia(name) on Deneice Sara  being transferred to 5th floor(unit) for routine progression of care       Report consisted of patients Situation, Background, Assessment and   Recommendations(SBAR). Information from the following report(s) SBAR, Kardex, ED Summary, Procedure Summary, Intake/Output, MAR, Recent Results and Cardiac Rhythm Sinus Glorious Begin was reviewed with the receiving nurse. Lines:   Peripheral IV 09/30/19 Left Forearm (Active)   Site Assessment Clean, dry, & intact 10/1/2019  7:28 AM   Phlebitis Assessment 0 10/1/2019  7:28 AM   Infiltration Assessment 0 10/1/2019  7:28 AM   Dressing Status Clean, dry, & intact 10/1/2019  7:28 AM   Dressing Type Transparent 10/1/2019  7:28 AM   Hub Color/Line Status Pink; Infusing 10/1/2019  7:28 AM   Action Taken Open ports on tubing capped 10/1/2019  3:18 AM   Alcohol Cap Used Yes 10/1/2019  7:28 AM        Opportunity for questions and clarification was provided. Patient transported with:   Tech     1410: Report given to Endoscopy nurse.  Plan to  patient for EGD then transfer to [facility]

## 2019-10-01 NOTE — PROGRESS NOTES

## 2019-10-01 NOTE — PROGRESS NOTES
Reviewed chart and spoke with the patient and the nurse. The patient was admitted with nausea and vomiting, she is 22 yo, and has been independent with mobility without an assistive device. She has been up ad maria e and has no recent falls or balance or mobility issues. Patient states, \"I didn't know that Physical Therapy helped with abdominal pain. \"  No Physical Therapy indicated at this time. Will complete the order.

## 2019-10-02 LAB
ALBUMIN SERPL-MCNC: 3.1 G/DL (ref 3.5–5)
ALBUMIN/GLOB SERPL: 1.1 {RATIO} (ref 1.1–2.2)
ALP SERPL-CCNC: 55 U/L (ref 45–117)
ALT SERPL-CCNC: 13 U/L (ref 12–78)
ANION GAP SERPL CALC-SCNC: 7 MMOL/L (ref 5–15)
AST SERPL-CCNC: 12 U/L (ref 15–37)
BASOPHILS # BLD: 0 K/UL (ref 0–0.1)
BASOPHILS NFR BLD: 0 % (ref 0–1)
BILIRUB SERPL-MCNC: 0.3 MG/DL (ref 0.2–1)
BUN SERPL-MCNC: 10 MG/DL (ref 6–20)
BUN/CREAT SERPL: 14 (ref 12–20)
CALCIUM SERPL-MCNC: 8.1 MG/DL (ref 8.5–10.1)
CHLORIDE SERPL-SCNC: 110 MMOL/L (ref 97–108)
CO2 SERPL-SCNC: 24 MMOL/L (ref 21–32)
CREAT SERPL-MCNC: 0.72 MG/DL (ref 0.55–1.02)
DIFFERENTIAL METHOD BLD: ABNORMAL
EOSINOPHIL # BLD: 0.1 K/UL (ref 0–0.4)
EOSINOPHIL NFR BLD: 1 % (ref 0–7)
ERYTHROCYTE [DISTWIDTH] IN BLOOD BY AUTOMATED COUNT: 14.6 % (ref 11.5–14.5)
GLOBULIN SER CALC-MCNC: 2.7 G/DL (ref 2–4)
GLUCOSE SERPL-MCNC: 81 MG/DL (ref 65–100)
HCT VFR BLD AUTO: 32.1 % (ref 35–47)
HGB BLD-MCNC: 10.1 G/DL (ref 11.5–16)
IMM GRANULOCYTES # BLD AUTO: 0 K/UL (ref 0–0.04)
IMM GRANULOCYTES NFR BLD AUTO: 0 % (ref 0–0.5)
LYMPHOCYTES # BLD: 1.9 K/UL (ref 0.8–3.5)
LYMPHOCYTES NFR BLD: 41 % (ref 12–49)
MCH RBC QN AUTO: 25.4 PG (ref 26–34)
MCHC RBC AUTO-ENTMCNC: 31.5 G/DL (ref 30–36.5)
MCV RBC AUTO: 80.7 FL (ref 80–99)
MONOCYTES # BLD: 0.2 K/UL (ref 0–1)
MONOCYTES NFR BLD: 4 % (ref 5–13)
NEUTS SEG # BLD: 2.5 K/UL (ref 1.8–8)
NEUTS SEG NFR BLD: 54 % (ref 32–75)
NRBC # BLD: 0 K/UL (ref 0–0.01)
NRBC BLD-RTO: 0 PER 100 WBC
PLATELET # BLD AUTO: 188 K/UL (ref 150–400)
PMV BLD AUTO: 11.4 FL (ref 8.9–12.9)
POTASSIUM SERPL-SCNC: 3.8 MMOL/L (ref 3.5–5.1)
PROT SERPL-MCNC: 5.8 G/DL (ref 6.4–8.2)
RBC # BLD AUTO: 3.98 M/UL (ref 3.8–5.2)
SODIUM SERPL-SCNC: 141 MMOL/L (ref 136–145)
WBC # BLD AUTO: 4.6 K/UL (ref 3.6–11)

## 2019-10-02 PROCEDURE — 85025 COMPLETE CBC W/AUTO DIFF WBC: CPT

## 2019-10-02 PROCEDURE — 74011250637 HC RX REV CODE- 250/637: Performed by: STUDENT IN AN ORGANIZED HEALTH CARE EDUCATION/TRAINING PROGRAM

## 2019-10-02 PROCEDURE — C9113 INJ PANTOPRAZOLE SODIUM, VIA: HCPCS | Performed by: INTERNAL MEDICINE

## 2019-10-02 PROCEDURE — 65660000000 HC RM CCU STEPDOWN

## 2019-10-02 PROCEDURE — 80053 COMPREHEN METABOLIC PANEL: CPT

## 2019-10-02 PROCEDURE — 99218 HC RM OBSERVATION: CPT

## 2019-10-02 PROCEDURE — 74011250636 HC RX REV CODE- 250/636: Performed by: STUDENT IN AN ORGANIZED HEALTH CARE EDUCATION/TRAINING PROGRAM

## 2019-10-02 PROCEDURE — 74011250637 HC RX REV CODE- 250/637: Performed by: INTERNAL MEDICINE

## 2019-10-02 PROCEDURE — 74011250636 HC RX REV CODE- 250/636: Performed by: INTERNAL MEDICINE

## 2019-10-02 PROCEDURE — 36415 COLL VENOUS BLD VENIPUNCTURE: CPT

## 2019-10-02 PROCEDURE — 74011250637 HC RX REV CODE- 250/637: Performed by: FAMILY MEDICINE

## 2019-10-02 RX ORDER — SCOLOPAMINE TRANSDERMAL SYSTEM 1 MG/1
1 PATCH, EXTENDED RELEASE TRANSDERMAL
Status: DISCONTINUED | OUTPATIENT
Start: 2019-10-02 | End: 2019-10-03 | Stop reason: HOSPADM

## 2019-10-02 RX ORDER — IBUPROFEN 200 MG
1 TABLET ORAL EVERY 24 HOURS
Qty: 30 PATCH | Refills: 0 | Status: SHIPPED | OUTPATIENT
Start: 2019-10-02 | End: 2019-10-03

## 2019-10-02 RX ORDER — METHOCARBAMOL 500 MG/1
500 TABLET, FILM COATED ORAL 4 TIMES DAILY
Status: DISCONTINUED | OUTPATIENT
Start: 2019-10-02 | End: 2019-10-03

## 2019-10-02 RX ORDER — GABAPENTIN 300 MG/1
300 CAPSULE ORAL 3 TIMES DAILY
Status: DISCONTINUED | OUTPATIENT
Start: 2019-10-02 | End: 2019-10-03

## 2019-10-02 RX ORDER — KETOROLAC TROMETHAMINE 30 MG/ML
30 INJECTION, SOLUTION INTRAMUSCULAR; INTRAVENOUS
Status: COMPLETED | OUTPATIENT
Start: 2019-10-02 | End: 2019-10-02

## 2019-10-02 RX ADMIN — PANTOPRAZOLE SODIUM 40 MG: 40 TABLET, DELAYED RELEASE ORAL at 06:47

## 2019-10-02 RX ADMIN — CLONAZEPAM 1 MG: 1 TABLET ORAL at 08:29

## 2019-10-02 RX ADMIN — ACETAMINOPHEN 650 MG: 325 TABLET ORAL at 12:12

## 2019-10-02 RX ADMIN — GABAPENTIN 300 MG: 300 CAPSULE ORAL at 23:19

## 2019-10-02 RX ADMIN — ONDANSETRON 4 MG: 2 INJECTION INTRAMUSCULAR; INTRAVENOUS at 00:48

## 2019-10-02 RX ADMIN — KETOROLAC TROMETHAMINE 30 MG: 30 INJECTION, SOLUTION INTRAMUSCULAR at 18:57

## 2019-10-02 RX ADMIN — ACETAMINOPHEN 650 MG: 325 TABLET ORAL at 17:20

## 2019-10-02 RX ADMIN — SODIUM CHLORIDE 100 ML/HR: 900 INJECTION, SOLUTION INTRAVENOUS at 17:16

## 2019-10-02 RX ADMIN — ONDANSETRON 4 MG: 2 INJECTION INTRAMUSCULAR; INTRAVENOUS at 20:41

## 2019-10-02 RX ADMIN — PROCHLORPERAZINE EDISYLATE 10 MG: 5 INJECTION INTRAMUSCULAR; INTRAVENOUS at 17:19

## 2019-10-02 RX ADMIN — ONDANSETRON 4 MG: 2 INJECTION INTRAMUSCULAR; INTRAVENOUS at 04:49

## 2019-10-02 RX ADMIN — METHOCARBAMOL TABLETS 500 MG: 500 TABLET, COATED ORAL at 12:12

## 2019-10-02 RX ADMIN — Medication 10 ML: at 06:47

## 2019-10-02 RX ADMIN — METHOCARBAMOL TABLETS 500 MG: 500 TABLET, COATED ORAL at 17:17

## 2019-10-02 RX ADMIN — GABAPENTIN 300 MG: 300 CAPSULE ORAL at 15:33

## 2019-10-02 RX ADMIN — SODIUM CHLORIDE 100 ML/HR: 900 INJECTION, SOLUTION INTRAVENOUS at 04:53

## 2019-10-02 RX ADMIN — GABAPENTIN 300 MG: 300 CAPSULE ORAL at 09:13

## 2019-10-02 RX ADMIN — Medication 10 ML: at 23:20

## 2019-10-02 RX ADMIN — METHOCARBAMOL TABLETS 500 MG: 500 TABLET, COATED ORAL at 09:13

## 2019-10-02 RX ADMIN — SODIUM CHLORIDE 40 MG: 9 INJECTION INTRAMUSCULAR; INTRAVENOUS; SUBCUTANEOUS at 20:42

## 2019-10-02 RX ADMIN — ONDANSETRON 4 MG: 2 INJECTION INTRAMUSCULAR; INTRAVENOUS at 15:33

## 2019-10-02 RX ADMIN — OXCARBAZEPINE 600 MG: 300 TABLET, FILM COATED ORAL at 17:17

## 2019-10-02 RX ADMIN — OLANZAPINE 15 MG: 5 TABLET, FILM COATED ORAL at 23:19

## 2019-10-02 RX ADMIN — ONDANSETRON 4 MG: 2 INJECTION INTRAMUSCULAR; INTRAVENOUS at 08:29

## 2019-10-02 RX ADMIN — ONDANSETRON 4 MG: 2 INJECTION INTRAMUSCULAR; INTRAVENOUS at 23:19

## 2019-10-02 RX ADMIN — OXCARBAZEPINE 600 MG: 300 TABLET, FILM COATED ORAL at 06:47

## 2019-10-02 RX ADMIN — ONDANSETRON 4 MG: 2 INJECTION INTRAMUSCULAR; INTRAVENOUS at 12:12

## 2019-10-02 RX ADMIN — METHOCARBAMOL TABLETS 500 MG: 500 TABLET, COATED ORAL at 23:19

## 2019-10-02 NOTE — PROGRESS NOTES
[address] [facility] [address] [place] [address]   Office [phone]  Fax [phone]          Assessment and Plan     Kiko John is a 21 y.o. female with a PMH asthma, PID, Endometriosis, fibromyalgia, Depression/Anxiety vs Bipolar Disorder, who is admitted for Acute on Chronic Abdominal pain. She has spent 2 night(s) in the hospital.    24 Hour Events: EGD done and showed gastritis and hiatal hernia. Protonix 40mg BID prescribed. Patient attempted to eat last night, but vomited afterwards. GI cocktail given. Acute on Chronic Abdominal Pain w/ intractable N/V: 2/2 Biliary Dyskinesia vs Cyclic Vomiting vs Marijuana vs gastritis. U/S abd gallbladder sludge. CT Abd neg. UDS +opiate, THC. HIDA (10/1): normal gallbladder. - MIVF NS at 100mL/hr  - IV Zofran 4mg q4H, IV compazine 10mg q6H prn  - Tylenol 650mg q6H LA or PO prn for pain  - Holding IV morphine prn d/t sinus bradycardia  - Daily labs (CBC, CMP)  - Surg recs: no surgical intervention at this time    Gastritis/Hiatal hernia: found on EGD (10/1/19)  - Stomach/esophageal biopsies pending  - Protonix 40mg BID  - Follow up GI recs     Elevated Blood Pressure: stable. POA /83. Likely 2/2 to pain. No Hx of HTN. - Continue to monitor BP     Psychiatric Hx (Bipolar Disorder vs MDD/Anxiety):    - Continue home Zyprexa 15mg nightly, Trileptal 600mg BID  - Clonazepam 1mg prn     Nicotine Dependence: 2 packs per week for 7 yrs  - Nicotine patch   - Encourage smoking cessation     FEN/GI - CLD. NPO after midnight. MIVF at 100 mL/hr. Activity - As tolerated  DVT prophylaxis - SCDs  GI prophylaxis - None for now   Disposition - Plan to d/c to home      CODE STATUS:  Partial (No intubation)     Kesha Long DO  Family Medicine Resident         Subjective / Objective     Subjective   Patient still complains of abd pain, nausea, and vomiting. Denies CP, SOB, lightheadedness.      Temp (24hrs), Av.9 °F (36.6 °C), Min:97.6 °F (36.4 °C), Max:98.2 °F (36.8 °C)     Objective  Respiratory: O2 Flow Rate (L/min): 0 l/min O2 Device: Room air   Visit Vitals  /67 (BP 1 Location: Left arm, BP Patient Position: At rest)   Pulse (!) 57   Temp 97.9 °F (36.6 °C)   Resp 15   Ht 5' 2\" (1.575 m)   Wt 138 lb (62.6 kg)   SpO2 97%   BMI 25.24 kg/m²       General No acute distress. Alert. Cooperative. Head: Normocephalic. Atraumatic. Respiratory: CTAB. No w/r/r/c.   Cardiovascular: Bradycardic. Regular rhythm. Normal S1,S2. No m/r/g.    GI: +bowel sounds. Nondistended. +moderate RUQ TTP    Extremities:  no LE edema. Distal pulses intact. Skin: Warm, dry. No rashes. I/O:  Date 10/01/19 0700 - 10/02/19 0659 10/02/19 0700 - 10/03/19 0659   Shift 6554-0630 7071-7414 24 Hour Total 7949-2574 2717-9052 24 Hour Total   INTAKE   P.O. 120  120        P. O. 120  120      I. V.(mL/kg/hr) 4208.9(2.4)  2617.5(1.7)        Volume (0.9% sodium chloride infusion) 4.2  4.2        Volume (0.9% sodium chloride infusion) 2613.3  2613.3      Shift Total(mL/kg) 2737. 5(43.7)  2737. 5(43.7)      OUTPUT   Shift Total(mL/kg)         NET 2737.5  2737.5      Weight (kg) 62.6 62.6 62.6 62.6 62.6 62.6       CBC:  Recent Labs     10/02/19  0649 10/01/19  0209 09/30/19  1118   WBC 4.6 5.1 6.2   HGB 10.1* 10.5* 11.3*   HCT 32.1* 33.9* 35.3    201 933       Metabolic Panel:  Recent Labs     10/01/19  0209 09/30/19  1118    140   K 3.8 3.4*   * 108   CO2 24 25   BUN 6 10   CREA 0.60 0.84   GLU 79 127*   CA 8.1* 8.4*   MG 1.8  --    PHOS 2.9  --    ALB 3.4* 3.9   SGOT 13* 17   ALT 14 18          For Billing    Chief Complaint   Patient presents with    Abdominal Pain    Nausea    Vomiting       Hospital Problems  Date Reviewed: 9/30/2019          Codes Class Noted POA    Sinus bradycardia ICD-10-CM: R00.1  ICD-9-CM: 427.89  10/1/2019 Unknown        * (Principal) Abdominal pain ICD-10-CM: R10.9  ICD-9-CM: 789.00  10/4/2017 Unknown

## 2019-10-02 NOTE — PROGRESS NOTES
General Surgery    Normal HIDA results noted. She has no apparent surgical issues. We will see again as needed.

## 2019-10-02 NOTE — PHYSICIAN ADVISORY
Letter of Status Determination:  
Recommend hospitalization status upgraded from OBSERVATION  to INPATIENT Status Pt Name:  [facility] MR#  
KATELYNN # [id number] / 
[id number] Payor: [employer] / Plan: [address] HMO / Product Type: HMO /   
CSN#  [id number] Room [facility] Hospital  502/01  @ [facility] Hospitalization date  9/30/2019 11:15 AM  
Current Attending Physician  Sindhu Shankar MD  
Principal diagnosis  Abdominal pain Clinicals [facility] is a 21 y.o. female w/ PMH asthma, PID, Endometriosis, fibromyalgia, Depression/Anxiety vs Bipolar Disorder, who presents to the ER complaining of 4 days of abdominal pain with nausea and vomiting. Her pain is located at the RUQ, stabbing/aching, constant, radiates to mid-back, worsened with eating and not alleviated by OTC Aleve/Tylenol. Pt states that her pain only goes away with dilaudid. Pt states that she has not been able to eat or take her medications since her symptoms started. She endorses that this pain has been on-going for past 4 years since she had cholestasis during 2 pregnancies. She only endorses a Hx of lactose intolerance ad no major GI issues. She denies fever/chills/new foods/diarrhea/dysuria.  
  
Pt endorses chronic marijuana usage (last time 3 weeks ago), smokes 2 packs per week of cigarettes and denies ETOH (as she was told not to because of her psych meds). EGD with gastritis and HH, continuous to be nauseas, requiringg IV Zofran and IVF, Wed Oct 2, 2019 0805 Dispense Jerry, Ads Dispense [facility] [id number] ADS Wed Oct 2, 2019 0405 Dispense Jerry, Ads Dispense [facility] [id number] ADS Tue Oct 1, 2019 2332 Dispense Jerry, Ads Dispense [facility] [id number] ADS Tue Oct 1, 2019 2053 Dispense Jerry, Ads Dispense [facility] [id number] ADS Tue Oct 1, 2019 1824 Dispense Jerry, Ads Dispense [facility] [id number] ADS Tue Oct 1, 2019 1738 Order Transfer Jerry, Ads Dispense [facility] [id number] ADS Tue Oct 1, 2019 1352 Dispense Jerry, Ads Dispense [facility] ER DEPT ADS Tue Oct 1, 2019 Pat Lewis MD [facility] ED [facility]OVERIFY [facility] [place] criteria Does  NOT apply STATUS DETERMINATION  INPATIENT The final decision of the patient's hospitalization status depends on the attending physician's judgment Additional comments Payor: L[employer]pez Randolph / Plan: [address] HMO / Product Type: HMO /   
  
 
Brady Jaimes MD 
Cell: [phone] Physician Advisor

## 2019-10-02 NOTE — PROGRESS NOTES
Gastrointestinal Progress Note    10/2/2019    Admit Date: 9/30/2019    Subjective:     Pain feels the same. Located in [address] With attempt as eating she had nausea/vomiting last night. No further episodes this AM. Had a few bites of breakfast this AM. Persistent nausea. Believes she was not able to orally retain medications given after her EGD exam    10/2/19 EGD (Dr. Angelika Washington) - Findings: Esophagus:normal Stomach: reducible hiatal hernia 33-36 cm and streaks mild erythema and shallow linear erosions Duodenum/jejunum: normal    Current Facility-Administered Medications   Medication Dose Route Frequency    pantoprazole (PROTONIX) 40 mg in sodium chloride 0.9% 10 mL injection  40 mg IntraVENous Q12H    methocarbamol (ROBAXIN) tablet 500 mg  500 mg Oral QID    gabapentin (NEURONTIN) capsule 300 mg  300 mg Oral TID    ondansetron (ZOFRAN) injection 4 mg  4 mg IntraVENous Q4H    prochlorperazine (COMPAZINE) injection 10 mg  10 mg IntraVENous Q6H PRN    acetaminophen (TYLENOL) tablet 650 mg  650 mg Oral Q4H PRN    acetaminophen (TYLENOL) suppository 650 mg  650 mg Rectal Q6H PRN    sodium chloride (NS) flush 5-40 mL  5-40 mL IntraVENous Q8H    sodium chloride (NS) flush 5-40 mL  5-40 mL IntraVENous PRN    nicotine (NICODERM CQ) 14 mg/24 hr patch 1 Patch  1 Patch TransDERmal Q24H    0.9% sodium chloride infusion  100 mL/hr IntraVENous CONTINUOUS    clonazePAM (KlonoPIN) tablet 1 mg  1 mg Oral DAILY PRN    OLANZapine (ZyPREXA) tablet 15 mg  15 mg Oral QHS    OXcarbazepine (TRILEPTAL) tablet 600 mg  600 mg Oral BID        Objective:     Blood pressure 108/67, pulse (!) 54, temperature 97.9 °F (36.6 °C), resp. rate 15, height 5' 2\" (1.575 m), weight 62.6 kg (138 lb), last menstrual period 09/13/2019, SpO2 97 %. No intake/output data recorded. 09/30 1901 - 10/02 0700  In: 2737.5 [P.O.:120;  I.V.:2617.5]  Out: 300 [Urine:300]    EXAM:  GENERAL: alert, cooperative, no distress, HEART: regular rate and rhythm, S1, S2 normal, no murmur, click, rub or gallop, LUNGS: chest clear, no wheezing, rales, normal symmetric air entry, ABDOMEN: soft, nondistended, RUQ tenderness and right lower rib tenderness EXTREMITY: extremities normal, atraumatic, no cyanosis or edema  AS I saw yesterday has very tender right side ribs, posterior, lateral and anterior; this appears reproduce pain pattern    Data Review    Recent Results (from the past 24 hour(s))   POC H. PYLORI, TISSUE    Collection Time: 10/01/19  4:35 PM   Result Value Ref Range    H. pylori from tissue Negative Negative    Positive control Positive     Negative control Negative     Lot no. [id number]    METABOLIC PANEL, COMPREHENSIVE    Collection Time: 10/02/19  6:49 AM   Result Value Ref Range    Sodium 141 136 - 145 mmol/L    Potassium 3.8 3.5 - 5.1 mmol/L    Chloride 110 (H) 97 - 108 mmol/L    CO2 24 21 - 32 mmol/L    Anion gap 7 5 - 15 mmol/L    Glucose 81 65 - 100 mg/dL    BUN 10 6 - 20 MG/DL    Creatinine 0.72 0.55 - 1.02 MG/DL    BUN/Creatinine ratio 14 12 - 20      GFR est AA >60 >60 ml/min/1.73m2    GFR est non-AA >60 >60 ml/min/1.73m2    Calcium 8.1 (L) 8.5 - 10.1 MG/DL    Bilirubin, total 0.3 0.2 - 1.0 MG/DL    ALT (SGPT) 13 12 - 78 U/L    AST (SGOT) 12 (L) 15 - 37 U/L    Alk.  phosphatase 55 45 - 117 U/L    Protein, total 5.8 (L) 6.4 - 8.2 g/dL    Albumin 3.1 (L) 3.5 - 5.0 g/dL    Globulin 2.7 2.0 - 4.0 g/dL    A-G Ratio 1.1 1.1 - 2.2     CBC WITH AUTOMATED DIFF    Collection Time: 10/02/19  6:49 AM   Result Value Ref Range    WBC 4.6 3.6 - 11.0 K/uL    RBC 3.98 3.80 - 5.20 M/uL    HGB 10.1 (L) 11.5 - 16.0 g/dL    HCT 32.1 (L) 35.0 - 47.0 %    MCV 80.7 80.0 - 99.0 FL    MCH 25.4 (L) 26.0 - 34.0 PG    MCHC 31.5 30.0 - 36.5 g/dL    RDW 14.6 (H) 11.5 - 14.5 %    PLATELET 394 363 - 702 K/uL    MPV 11.4 8.9 - 12.9 FL    NRBC 0.0 0  WBC    ABSOLUTE NRBC 0.00 0.00 - 0.01 K/uL    NEUTROPHILS 54 32 - 75 %    LYMPHOCYTES 41 12 - 49 %    MONOCYTES 4 (L) 5 - 13 % EOSINOPHILS 1 0 - 7 %    BASOPHILS 0 0 - 1 %    IMMATURE GRANULOCYTES 0 0.0 - 0.5 %    ABS. NEUTROPHILS 2.5 1.8 - 8.0 K/UL    ABS. LYMPHOCYTES 1.9 0.8 - 3.5 K/UL    ABS. MONOCYTES 0.2 0.0 - 1.0 K/UL    ABS. EOSINOPHILS 0.1 0.0 - 0.4 K/UL    ABS. BASOPHILS 0.0 0.0 - 0.1 K/UL    ABS. IMM.  GRANS. 0.0 0.00 - 0.04 K/UL    DF AUTOMATED         Assessment:     Principal Problem:    Abdominal pain (10/4/2017)    Active Problems:    Sinus bradycardia (10/1/2019)    nausea/vomiting    Rib tenderness    Plan: formulated with Dr. Kenny Sanchez     Likely musculoskeletal pain a factor also - gabapentin and methocarbamal added to regimen  IV pantoprazole until tolerating PO better  Diet as tolerated (so far tolerated a small amount this AM)    Prabhjot , GEORGE   10/02/19  9:40 AM  I have interviewed and examined patient with addendum to note above and formulation care plan to reflect my evaluation    Kristina Agudelo M.D.

## 2019-10-02 NOTE — ROUTINE PROCESS
Pt given compazine and GI cocktail. Notified pt that physician put in tylenol order via rectum. Pt still very agitated and angry requesting IV pain meds. Educated pt that IV pain meds cannot be given when HR low. Pt still requesting IV pain meds.

## 2019-10-02 NOTE — PROGRESS NOTES
Bedside and Verbal shift change report given to WESTON Quinonez (oncoming nurse) by Bar Taylor (offgoing nurse). Report included the following information SBAR, Kardex, Procedure Summary, Intake/Output, MAR, Recent Results and Med Rec Status.

## 2019-10-02 NOTE — ROUTINE PROCESS
Bedside shift change report given to 3916 Serafin Lowry (oncoming nurse) by Lawanda Mukherjee (offgoing nurse). Report included the following information SBAR, Kardex, Intake/Output, MAR, Accordion, Recent Results and Med Rec Status.

## 2019-10-03 VITALS
SYSTOLIC BLOOD PRESSURE: 130 MMHG | DIASTOLIC BLOOD PRESSURE: 72 MMHG | RESPIRATION RATE: 16 BRPM | HEIGHT: 62 IN | HEART RATE: 56 BPM | WEIGHT: 138 LBS | TEMPERATURE: 98 F | OXYGEN SATURATION: 97 % | BODY MASS INDEX: 25.4 KG/M2

## 2019-10-03 LAB
ALBUMIN SERPL-MCNC: 3.3 G/DL (ref 3.5–5)
ALBUMIN/GLOB SERPL: 1.2 {RATIO} (ref 1.1–2.2)
ALP SERPL-CCNC: 57 U/L (ref 45–117)
ALT SERPL-CCNC: 14 U/L (ref 12–78)
ANION GAP SERPL CALC-SCNC: 9 MMOL/L (ref 5–15)
AST SERPL-CCNC: 13 U/L (ref 15–37)
BASOPHILS # BLD: 0 K/UL (ref 0–0.1)
BASOPHILS NFR BLD: 0 % (ref 0–1)
BILIRUB SERPL-MCNC: 0.3 MG/DL (ref 0.2–1)
BUN SERPL-MCNC: 5 MG/DL (ref 6–20)
BUN/CREAT SERPL: 6 (ref 12–20)
CALCIUM SERPL-MCNC: 8.3 MG/DL (ref 8.5–10.1)
CHLORIDE SERPL-SCNC: 112 MMOL/L (ref 97–108)
CO2 SERPL-SCNC: 23 MMOL/L (ref 21–32)
CREAT SERPL-MCNC: 0.85 MG/DL (ref 0.55–1.02)
DIFFERENTIAL METHOD BLD: ABNORMAL
EOSINOPHIL # BLD: 0.1 K/UL (ref 0–0.4)
EOSINOPHIL NFR BLD: 1 % (ref 0–7)
ERYTHROCYTE [DISTWIDTH] IN BLOOD BY AUTOMATED COUNT: 14.6 % (ref 11.5–14.5)
GLOBULIN SER CALC-MCNC: 2.7 G/DL (ref 2–4)
GLUCOSE SERPL-MCNC: 84 MG/DL (ref 65–100)
HCT VFR BLD AUTO: 30.8 % (ref 35–47)
HGB BLD-MCNC: 9.9 G/DL (ref 11.5–16)
IMM GRANULOCYTES # BLD AUTO: 0 K/UL (ref 0–0.04)
IMM GRANULOCYTES NFR BLD AUTO: 0 % (ref 0–0.5)
LYMPHOCYTES # BLD: 2.1 K/UL (ref 0.8–3.5)
LYMPHOCYTES NFR BLD: 36 % (ref 12–49)
MAGNESIUM SERPL-MCNC: 1.7 MG/DL (ref 1.6–2.4)
MCH RBC QN AUTO: 25.6 PG (ref 26–34)
MCHC RBC AUTO-ENTMCNC: 32.1 G/DL (ref 30–36.5)
MCV RBC AUTO: 79.6 FL (ref 80–99)
MONOCYTES # BLD: 0.2 K/UL (ref 0–1)
MONOCYTES NFR BLD: 4 % (ref 5–13)
NEUTS SEG # BLD: 3.4 K/UL (ref 1.8–8)
NEUTS SEG NFR BLD: 59 % (ref 32–75)
NRBC # BLD: 0 K/UL (ref 0–0.01)
NRBC BLD-RTO: 0 PER 100 WBC
PHOSPHATE SERPL-MCNC: 3.5 MG/DL (ref 2.6–4.7)
PLATELET # BLD AUTO: 199 K/UL (ref 150–400)
PMV BLD AUTO: 11 FL (ref 8.9–12.9)
POTASSIUM SERPL-SCNC: 3.2 MMOL/L (ref 3.5–5.1)
PROT SERPL-MCNC: 6 G/DL (ref 6.4–8.2)
RBC # BLD AUTO: 3.87 M/UL (ref 3.8–5.2)
SODIUM SERPL-SCNC: 144 MMOL/L (ref 136–145)
WBC # BLD AUTO: 5.9 K/UL (ref 3.6–11)

## 2019-10-03 PROCEDURE — 74011250637 HC RX REV CODE- 250/637: Performed by: STUDENT IN AN ORGANIZED HEALTH CARE EDUCATION/TRAINING PROGRAM

## 2019-10-03 PROCEDURE — 74011250636 HC RX REV CODE- 250/636: Performed by: STUDENT IN AN ORGANIZED HEALTH CARE EDUCATION/TRAINING PROGRAM

## 2019-10-03 PROCEDURE — 74011250636 HC RX REV CODE- 250/636: Performed by: INTERNAL MEDICINE

## 2019-10-03 PROCEDURE — 99218 HC RM OBSERVATION: CPT

## 2019-10-03 PROCEDURE — 74011000250 HC RX REV CODE- 250: Performed by: INTERNAL MEDICINE

## 2019-10-03 PROCEDURE — C9113 INJ PANTOPRAZOLE SODIUM, VIA: HCPCS | Performed by: INTERNAL MEDICINE

## 2019-10-03 PROCEDURE — 84100 ASSAY OF PHOSPHORUS: CPT

## 2019-10-03 PROCEDURE — 36415 COLL VENOUS BLD VENIPUNCTURE: CPT

## 2019-10-03 PROCEDURE — 74011250637 HC RX REV CODE- 250/637: Performed by: INTERNAL MEDICINE

## 2019-10-03 PROCEDURE — 83735 ASSAY OF MAGNESIUM: CPT

## 2019-10-03 PROCEDURE — 85025 COMPLETE CBC W/AUTO DIFF WBC: CPT

## 2019-10-03 PROCEDURE — 80053 COMPREHEN METABOLIC PANEL: CPT

## 2019-10-03 RX ORDER — PANTOPRAZOLE SODIUM 40 MG/1
40 TABLET, DELAYED RELEASE ORAL
Status: DISCONTINUED | OUTPATIENT
Start: 2019-10-03 | End: 2019-10-03 | Stop reason: HOSPADM

## 2019-10-03 RX ORDER — PANTOPRAZOLE SODIUM 40 MG/1
40 TABLET, DELAYED RELEASE ORAL 2 TIMES DAILY
Qty: 60 TAB | Refills: 0 | Status: SHIPPED | OUTPATIENT
Start: 2019-10-03 | End: 2019-10-03 | Stop reason: SDUPTHER

## 2019-10-03 RX ORDER — PANTOPRAZOLE SODIUM 40 MG/1
40 TABLET, DELAYED RELEASE ORAL 2 TIMES DAILY
Qty: 60 TAB | Refills: 0 | Status: SHIPPED | OUTPATIENT
Start: 2019-10-03 | End: 2019-11-02

## 2019-10-03 RX ORDER — GABAPENTIN 400 MG/1
400 CAPSULE ORAL 3 TIMES DAILY
Qty: 42 CAP | Refills: 0 | Status: SHIPPED | OUTPATIENT
Start: 2019-10-03 | End: 2019-10-17

## 2019-10-03 RX ORDER — METHOCARBAMOL 500 MG/1
1000 TABLET, FILM COATED ORAL 4 TIMES DAILY
Qty: 112 TAB | Refills: 0 | Status: SHIPPED | OUTPATIENT
Start: 2019-10-03 | End: 2019-10-03

## 2019-10-03 RX ORDER — METHOCARBAMOL 500 MG/1
1000 TABLET, FILM COATED ORAL 4 TIMES DAILY
Qty: 112 TAB | Refills: 0 | Status: SHIPPED | OUTPATIENT
Start: 2019-10-03 | End: 2019-10-17

## 2019-10-03 RX ORDER — METHOCARBAMOL 500 MG/1
1000 TABLET, FILM COATED ORAL 4 TIMES DAILY
Status: DISCONTINUED | OUTPATIENT
Start: 2019-10-03 | End: 2019-10-03 | Stop reason: HOSPADM

## 2019-10-03 RX ORDER — IBUPROFEN 200 MG
1 TABLET ORAL EVERY 24 HOURS
Qty: 30 PATCH | Refills: 0 | Status: SHIPPED | OUTPATIENT
Start: 2019-10-03 | End: 2019-11-02

## 2019-10-03 RX ADMIN — ONDANSETRON 4 MG: 2 INJECTION INTRAMUSCULAR; INTRAVENOUS at 03:28

## 2019-10-03 RX ADMIN — Medication 10 ML: at 06:10

## 2019-10-03 RX ADMIN — ONDANSETRON 4 MG: 2 INJECTION INTRAMUSCULAR; INTRAVENOUS at 08:50

## 2019-10-03 RX ADMIN — SODIUM CHLORIDE 40 MG: 9 INJECTION INTRAMUSCULAR; INTRAVENOUS; SUBCUTANEOUS at 08:50

## 2019-10-03 RX ADMIN — METHOCARBAMOL TABLETS 1000 MG: 500 TABLET, COATED ORAL at 09:00

## 2019-10-03 RX ADMIN — OXCARBAZEPINE 600 MG: 300 TABLET, FILM COATED ORAL at 06:10

## 2019-10-03 RX ADMIN — GABAPENTIN 400 MG: 300 CAPSULE ORAL at 08:50

## 2019-10-03 RX ADMIN — SODIUM CHLORIDE 100 ML/HR: 900 INJECTION, SOLUTION INTRAVENOUS at 03:28

## 2019-10-03 NOTE — PROGRESS NOTES
1304:  NN notified. JohnRN    CM Note:  Plan:  1. When medically stable to d/c to home transported by her . 2.To f/u with her providers  DIVYA Rosas

## 2019-10-03 NOTE — PROGRESS NOTES
Gastrointestinal Progress Note    10/3/2019    Admit Date: 9/30/2019    Subjective:     Patient feeling better today. Had one episode of N/V last night but no further episodes. Reports that when pain improves less N/V. She tolerated breakfast this AM. Wants to go home. Reports able to eat; when pains RUQ occur has nausea and then vomiting occurs; extreme anxiety occurs with pains    10/2/19 EGD (Dr. Yoli Heredia) - Findings: Esophagus:normal Stomach: reducible hiatal hernia 33-36 cm and streaks mild erythema and shallow linear erosions Duodenum/jejunum: normal    Current Facility-Administered Medications   Medication Dose Route Frequency    gabapentin (NEURONTIN) capsule 400 mg  400 mg Oral TID    methocarbamol (ROBAXIN) tablet 1,000 mg  1,000 mg Oral QID    pantoprazole (PROTONIX) 40 mg in sodium chloride 0.9% 10 mL injection  40 mg IntraVENous Q12H    scopolamine (TRANSDERM-SCOP) 1 mg over 3 days 1 Patch  1 Patch TransDERmal Q72H    ondansetron (ZOFRAN) injection 4 mg  4 mg IntraVENous Q4H    prochlorperazine (COMPAZINE) injection 10 mg  10 mg IntraVENous Q6H PRN    acetaminophen (TYLENOL) tablet 650 mg  650 mg Oral Q4H PRN    acetaminophen (TYLENOL) suppository 650 mg  650 mg Rectal Q6H PRN    sodium chloride (NS) flush 5-40 mL  5-40 mL IntraVENous Q8H    sodium chloride (NS) flush 5-40 mL  5-40 mL IntraVENous PRN    nicotine (NICODERM CQ) 14 mg/24 hr patch 1 Patch  1 Patch TransDERmal Q24H    0.9% sodium chloride infusion  100 mL/hr IntraVENous CONTINUOUS    clonazePAM (KlonoPIN) tablet 1 mg  1 mg Oral DAILY PRN    OLANZapine (ZyPREXA) tablet 15 mg  15 mg Oral QHS    OXcarbazepine (TRILEPTAL) tablet 600 mg  600 mg Oral BID        Objective:     Blood pressure 128/80, pulse 60, temperature 97.7 °F (36.5 °C), resp. rate 16, height 5' 2\" (1.575 m), weight 62.6 kg (138 lb), last menstrual period 09/13/2019, SpO2 98 %. No intake/output data recorded. No intake/output data recorded.     EXAM:  GENERAL: alert, cooperative, no distress, HEART: regular rate and rhythm, S1, S2 normal, no murmur, click, rub or gallop, LUNGS: chest clear, no wheezing, rales, normal symmetric air entry, ABDOMEN: soft, nondistended, nontender; right lower rib tenderness EXTREMITY: extremities normal, atraumatic, no cyanosis or edema All tenderness is again seen confined to ribs and especially costal margins  Data Review    Recent Results (from the past 24 hour(s))   METABOLIC PANEL, COMPREHENSIVE    Collection Time: 10/03/19  5:17 AM   Result Value Ref Range    Sodium 144 136 - 145 mmol/L    Potassium 3.2 (L) 3.5 - 5.1 mmol/L    Chloride 112 (H) 97 - 108 mmol/L    CO2 23 21 - 32 mmol/L    Anion gap 9 5 - 15 mmol/L    Glucose 84 65 - 100 mg/dL    BUN 5 (L) 6 - 20 MG/DL    Creatinine 0.85 0.55 - 1.02 MG/DL    BUN/Creatinine ratio 6 (L) 12 - 20      GFR est AA >60 >60 ml/min/1.73m2    GFR est non-AA >60 >60 ml/min/1.73m2    Calcium 8.3 (L) 8.5 - 10.1 MG/DL    Bilirubin, total 0.3 0.2 - 1.0 MG/DL    ALT (SGPT) 14 12 - 78 U/L    AST (SGOT) 13 (L) 15 - 37 U/L    Alk. phosphatase 57 45 - 117 U/L    Protein, total 6.0 (L) 6.4 - 8.2 g/dL    Albumin 3.3 (L) 3.5 - 5.0 g/dL    Globulin 2.7 2.0 - 4.0 g/dL    A-G Ratio 1.2 1.1 - 2.2     CBC WITH AUTOMATED DIFF    Collection Time: 10/03/19  5:17 AM   Result Value Ref Range    WBC 5.9 3.6 - 11.0 K/uL    RBC 3.87 3.80 - 5.20 M/uL    HGB 9.9 (L) 11.5 - 16.0 g/dL    HCT 30.8 (L) 35.0 - 47.0 %    MCV 79.6 (L) 80.0 - 99.0 FL    MCH 25.6 (L) 26.0 - 34.0 PG    MCHC 32.1 30.0 - 36.5 g/dL    RDW 14.6 (H) 11.5 - 14.5 %    PLATELET 026 370 - 633 K/uL    MPV 11.0 8.9 - 12.9 FL    NRBC 0.0 0  WBC    ABSOLUTE NRBC 0.00 0.00 - 0.01 K/uL    NEUTROPHILS 59 32 - 75 %    LYMPHOCYTES 36 12 - 49 %    MONOCYTES 4 (L) 5 - 13 %    EOSINOPHILS 1 0 - 7 %    BASOPHILS 0 0 - 1 %    IMMATURE GRANULOCYTES 0 0.0 - 0.5 %    ABS. NEUTROPHILS 3.4 1.8 - 8.0 K/UL    ABS. LYMPHOCYTES 2.1 0.8 - 3.5 K/UL    ABS.  MONOCYTES 0.2 0.0 - 1.0 K/UL    ABS. EOSINOPHILS 0.1 0.0 - 0.4 K/UL    ABS. BASOPHILS 0.0 0.0 - 0.1 K/UL    ABS. IMM.  GRANS. 0.0 0.00 - 0.04 K/UL    DF AUTOMATED     MAGNESIUM    Collection Time: 10/03/19  5:17 AM   Result Value Ref Range    Magnesium 1.7 1.6 - 2.4 mg/dL   PHOSPHORUS    Collection Time: 10/03/19  5:17 AM   Result Value Ref Range    Phosphorus 3.5 2.6 - 4.7 MG/DL       Assessment:     Principal Problem:    Abdominal pain (10/4/2017)    Active Problems:    Sinus bradycardia (10/1/2019)    nausea/vomiting    Rib tenderness    Plan: formulated with Dr. Leopold Calkin gabapentin and methocarbamal that was added to regimen  Daily PPI on discharge  As patient tolerating diet and pain controlled no objection to discharge    Karyn Rodrigez PA-C   10/03/19  10:34 AM    She would need follow up pcp FOR RIB pains, anxiety  I have interviewed and examined patient with addendum to note above and formulation care plan to reflect my evaluation    Cadence Manuel M.D.

## 2019-10-03 NOTE — PROGRESS NOTES
Bedside and Verbal shift change report given to Wellington Sawant RN (oncoming nurse) by Fior Bernstein RN (offgoing nurse). Report included the following information SBAR, Kardex, Intake/Output, MAR, Accordion and Recent Results.
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
## 2019-10-03 NOTE — DISCHARGE INSTRUCTIONS
HOME DISCHARGE INSTRUCTIONS    Isma Wood / [id number] : 1995    Admission date: 2019 Discharge date: 10/3/2019     Please bring this form with you to show your care provider at your follow-up appointment. Primary care provider:  Beatrice Butcher MD    Discharging provider:  Apoorva Tomas DO  - Family Medicine Resident  Milka Warner MD - Attending, Family Medicine     You have been admitted to the hospital with the following diagnoses:    ACUTE DIAGNOSES:  Abdominal pain [R10.9]  Abdominal pain [R10.9]  . . . . . . . . . . . . . . . . . . . . . . . . . . . . . . . . . . . . . . . . . . . . . . . . . . . . . . . . . . . . . . . . . . . . . . . Justus Maier FOLLOW-UP CARE RECOMMENDATIONS:    Appointments  Follow-up Information     Follow up With Specialties Details Why Contact Info    Erik Tapia MD Family Practice On 10/8/2019 Hospital Follow-up at [address] [place] [address]      Macario Khan MD Gastroenterology Schedule an appointment as soon as possible for a visit in 2 weeks Hospital Follow up [address] [place] [address]  [phone]             Please follow up with your PCP regardin. Acute on chronic abdominal pain with intractable nausea and vomiting  2. Sinus bradycardia  3. Gastritis/hiatal hernia    Follow-up tests needed: none    Pending test results: At the time of your discharge the following test results are still pending: Gastric/esophageal biopsy results. Please make sure you review these results with your outpatient follow-up provider(s). Specific symptoms to watch for: chest pain, shortness of breath, fever, chills, nausea, vomiting, diarrhea, change in mentation, falling, weakness, bleeding. DIET/what to eat:  Regular Diet    ACTIVITY:  Activity as tolerated    Wound care: none    Equipment needed:  none    What to do if new or unexpected symptoms occur?     If you experience any of the above symptoms (or should other concerns or questions arise after discharge) please call your primary care physician. Return to the emergency room if you cannot get hold of your doctor. · It is very important that you keep your follow-up appointment(s). · Please bring discharge papers, medication list (and/or medication bottles) to your follow-up appointments for review by your outpatient provider(s). · Please check the list of medications and be sure it includes every medication (even non-prescription medications) that your provider wants you to take. · It is important that you take the medication exactly as they are prescribed. · Keep your medication in the bottles provided by the pharmacist and keep a list of the medication names, dosages, and times to be taken in your wallet. · Do not take other medications without consulting your doctor. · If you have any questions about your medications or other instructions, please talk to your nurse or care provider before you leave the hospital.     Information obtained by:     I understand that if any problems occur once I am at home I am to contact my physician. These instructions were explained to me and I had the opportunity to ask questions. I understand and acknowledge receipt of the instructions indicated above. Physician's or R.N.'s Signature                                                                  Date/Time                                                                                                                                              Patient or Representative Signature                                                          Date/Time      Patient Education        Gastritis: Care Instructions  Your Care Instructions    Gastritis is a sore and upset stomach. It happens when something irritates the stomach lining. Many things can cause it. These include an infection such as the flu or something you ate or drank. Medicines or a sore on the lining of the stomach (ulcer) also can cause it. Your belly may bloat and ache. You may belch, vomit, and feel sick to your stomach. You should be able to relieve the problem by taking medicine. And it may help to change your diet. If gastritis lasts, your doctor may prescribe medicine. Follow-up care is a key part of your treatment and safety. Be sure to make and go to all appointments, and call your doctor if you are having problems. It's also a good idea to know your test results and keep a list of the medicines you take. How can you care for yourself at home? · If your doctor prescribed antibiotics, take them as directed. Do not stop taking them just because you feel better. You need to take the full course of antibiotics. · Be safe with medicines. If your doctor prescribed medicine to decrease stomach acid, take it as directed. Call your doctor if you think you are having a problem with your medicine. · Do not take any other medicine, including over-the-counter pain relievers, without talking to your doctor first.  · If your doctor recommends over-the-counter medicine to reduce stomach acid, such as Pepcid AC, Prilosec, Tagamet HB, or Zantac 75, follow the directions on the label. · Drink plenty of fluids (enough so that your urine is light yellow or clear like water) to prevent dehydration. Choose water and other caffeine-free clear liquids. If you have kidney, heart, or liver disease and have to limit fluids, talk with your doctor before you increase the amount of fluids you drink. · Limit how much alcohol you drink. · Avoid coffee, tea, cola drinks, chocolate, and other foods with caffeine. They increase stomach acid. When should you call for help? Call 911 anytime you think you may need emergency care.  For example, call if:    · You vomit blood or what looks like coffee grounds.     · You pass maroon or very bloody stools.    Call your doctor now or seek immediate medical care if:    · You start breathing fast and have not produced urine in the last 8 hours.     · You cannot keep fluids down.    Watch closely for changes in your health, and be sure to contact your doctor if:    · You do not get better as expected. Where can you learn more? Go to http://kirk-izzy.info/. Enter [id number] in the search box to learn more about \"Gastritis: Care Instructions. \"  Current as of: November 7, 2018  Content Version: 12.2  © 8900-9422 [employer]. Care instructions adapted under license by [employer] (which disclaims liability or warranty for this information). If you have questions about a medical condition or this instruction, always ask your healthcare professional. [address] any warranty or liability for your use of this information.

## 2019-10-03 NOTE — PROGRESS NOTES
[facility] Dosing Services: IV to PO Conversion    The pharmacist has determined that this patient meets P & T approved criteria for conversion from IV to oral therapy for the following medication:Pantoprazole    The pharmacist has written the following order for the patient: protonix 40 mg po daily   The pharmacist will continue to monitor the patient's status and advise the physician if conversion back to IV therapy is recommended.     Signed Horacio Epley, [facility] Contact information:  [phone]

## 2019-10-03 NOTE — PROGRESS NOTES
[address] [facility] Heather English 32   Office [phone]  Fax [phone]          Assessment and [address] Lani is a 21 y. o. female with a PMH asthma, PID, Endometriosis, fibromyalgia, Depression/Anxiety vs Bipolar Disorder, who is admitted for Acute on Chronic Abdominal pain. 24 Hour Events: Still having episodes of nausea and vomiting. Vomited last night after eating. Started on robaxin and gabapentin for possible MSK pain. Acute on Chronic Abdominal Pain w/ intractable N/V: 2/2 gastritis vs MSK pain. U/S abd gallbladder sludge. CT Abd neg. UDS +opiate, THC. HIDA (10/1): normal gallbladder. - MIVF NS at 100mL/hr  - Holding IV morphine prn d/t sinus bradycardia  - IV Zofran 4mg q4H, IV compazine 10mg q6H prn, scopolamine patch for nausea  - Gabapentin 300 mg TID and Robaxin 500mg QID for possible MSK pain  - Tylenol 650mg q6H DE or PO prn for pain  - Daily labs (CBC, CMP)  - Surg recs: no surgical intervention at this time  - F/u GI recs    Sinus bradycardia: asymptomatic. HR 40-70s. Baseline HR . EKG shows sinus bradycardia. UDS +opiates, THC  - Continue cardiac monitoring  - Continue MIVF  - Hold morphine    Gastritis/Hiatal hernia: found on EGD (10/1/19)  - Stomach/esophageal biopsies pending  - IV Protonix 40mg BID until can tolerate PO per GI    Elevated Blood Pressure: stable. POA /83. Likely 2/2 to pain. No Hx of HTN. - Continue to monitor BP    Psychiatric Hx (Bipolar Disorder vs MDD/Anxiety):    - Continue home Zyprexa 15mg nightly, Trileptal 600mg BID  - Clonazepam 1mg prn    Nicotine Dependence: 2 packs per week for 7 yrs  - Nicotine patch   - Encourage smoking cessation     FEN/GI  Cardiac diet. MIVF at 100 mL/hr.    Activity - As tolerated  DVT prophylaxis - SCDs  GI prophylaxis - None for now   Disposition - Plan to d/c to home   CODE STATUS:  Partial (No intubation)     Clement Due, DO  Family Medicine Resident         Subjective / Objective Subjective  Patient reports improvement in pain since admission. Still having nausea and vomiting after meals. States that robaxin has been helping with the pain. Temp (24hrs), Av.8 °F (36.6 °C), Min:97.3 °F (36.3 °C), Max:98.1 °F (36.7 °C)     Objective  Respiratory: O2 Flow Rate (L/min): 0 l/min O2 Device: Room air   Visit Vitals  /80 (BP 1 Location: Left arm, BP Patient Position: Sitting)   Pulse 60   Temp 97.7 °F (36.5 °C)   Resp 16   Ht 5' 2\" (1.575 m)   Wt 138 lb (62.6 kg)   SpO2 98%   BMI 25.24 kg/m²       General: No acute distress. Alert. Cooperative. Head: Normocephalic. Atraumatic. Respiratory: CTAB. No w/r/r/c.   Cardiovascular: RRR. Normal S1,S2. No m/r/g. P   GI: Nondistended. +RUQ TTP   Extremities:  No LE edema. Distal pulses intact. Skin: Warm, dry. No rashes.       I/O:  Date 10/02/19 0700 - 10/03/19 0659 10/03/19 0700 - 10/04/19 0659   Shift 8762-6321 9689-6475 24 Hour Total 9641-5880 2610-3531 24 Hour Total   INTAKE   I.V.(mL/kg/hr)  0(0) 0(0)        Volume (0.9% sodium chloride infusion)  0 0      Shift Total(mL/kg)  0(0) 0(0)      OUTPUT   Shift Total(mL/kg)         NET  0 0      Weight (kg) 62.6 62.6 62.6 62.6 62.6 62.6       CBC:  Recent Labs     10/03/19  0517 10/02/19  0649 10/01/19  0209   WBC 5.9 4.6 5.1   HGB 9.9* 10.1* 10.5*   HCT 30.8* 32.1* 33.9*    188 182       Metabolic Panel:  Recent Labs     10/03/19  0517 10/02/19  0649 10/01/19  0209    141 142   K 3.2* 3.8 3.8   * 110* 111*   CO2 23 24 24   BUN 5* 10 6   CREA 0.85 0.72 0.60   GLU 84 81 79   CA 8.3* 8.1* 8.1*   MG 1.7  --  1.8   PHOS 3.5  --  2.9   ALB 3.3* 3.1* 3.4*   SGOT 13* 12* 13*   ALT 14 13 14          For Billing    Chief Complaint   Patient presents with    Abdominal Pain    Nausea    Vomiting       Hospital Problems  Date Reviewed: 2019          Codes Class Noted POA    Sinus bradycardia ICD-10-CM: R00.1  ICD-9-CM: 427.89  10/1/2019 Unknown        * (Principal) Abdominal pain ICD-10-CM: R10.9  ICD-9-CM: 789.00  10/4/2017 Unknown

## 2019-10-04 ENCOUNTER — TELEPHONE (OUTPATIENT)
Dept: FAMILY MEDICINE CLINIC | Age: 24
End: 2019-10-04

## 2019-10-04 NOTE — TELEPHONE ENCOUNTER
Pt was in hospital for a week for gallbladder and found a hernia as well - was able to go home 10/3/19 from [facility]   Pt is now in pain again, diarrhea, vomiting and wants to know what to do because she does not want to go back to ER because hspt told her not to come back unless it was an emergency has an appt with Dr Shawna Hansen appt on Tuesday, Internal medicine  on Thursday    Call pt at [phone] with advise today asap

## 2020-02-16 ENCOUNTER — ED HISTORICAL/CONVERTED ENCOUNTER (OUTPATIENT)
Dept: OTHER | Age: 25
End: 2020-02-16

## 2020-11-11 ENCOUNTER — VIRTUAL VISIT (OUTPATIENT)
Dept: FAMILY MEDICINE CLINIC | Age: 25
End: 2020-11-11

## 2020-11-11 DIAGNOSIS — M54.42 ACUTE MIDLINE LOW BACK PAIN WITH LEFT-SIDED SCIATICA: Primary | ICD-10-CM

## 2020-11-11 PROCEDURE — 99213 OFFICE O/P EST LOW 20 MIN: CPT | Performed by: NURSE PRACTITIONER

## 2020-11-11 RX ORDER — DICLOFENAC SODIUM 75 MG/1
75 TABLET, DELAYED RELEASE ORAL 2 TIMES DAILY
Qty: 60 TAB | Refills: 0 | Status: SHIPPED | OUTPATIENT
Start: 2020-11-11 | End: 2020-12-23 | Stop reason: ALTCHOICE

## 2020-11-11 RX ORDER — METHYLPREDNISOLONE 4 MG/1
TABLET ORAL
Qty: 1 DOSE PACK | Refills: 0 | Status: SHIPPED | OUTPATIENT
Start: 2020-11-11 | End: 2020-12-23 | Stop reason: ALTCHOICE

## 2020-11-11 RX ORDER — CYCLOBENZAPRINE HCL 10 MG
10 TABLET ORAL
Qty: 30 TAB | Refills: 0 | Status: SHIPPED | OUTPATIENT
Start: 2020-11-11 | End: 2020-12-23 | Stop reason: ALTCHOICE
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
## 2020-12-23 ENCOUNTER — VIRTUAL VISIT (OUTPATIENT)
Dept: FAMILY MEDICINE CLINIC | Age: 25
End: 2020-12-23

## 2020-12-23 DIAGNOSIS — H92.01 OTALGIA, RIGHT: Primary | ICD-10-CM

## 2020-12-23 PROBLEM — E86.0 DEHYDRATION: Status: RESOLVED | Noted: 2017-07-26 | Resolved: 2020-12-23

## 2020-12-23 PROBLEM — R10.9 ABDOMINAL PAIN: Status: RESOLVED | Noted: 2017-10-04 | Resolved: 2020-12-23

## 2020-12-23 PROBLEM — R11.10 HYPEREMESIS: Status: RESOLVED | Noted: 2017-06-28 | Resolved: 2020-12-23

## 2020-12-23 PROBLEM — F12.10 CANNABIS USE DISORDER, MILD, ABUSE: Status: RESOLVED | Noted: 2018-10-23 | Resolved: 2020-12-23

## 2020-12-23 PROBLEM — O21.0 HYPEREMESIS AFFECTING PREGNANCY, ANTEPARTUM: Status: RESOLVED | Noted: 2017-07-07 | Resolved: 2020-12-23

## 2020-12-23 PROBLEM — O21.0 HYPEREMESIS GRAVIDARUM: Status: RESOLVED | Noted: 2017-06-30 | Resolved: 2020-12-23

## 2020-12-23 PROCEDURE — 99213 OFFICE O/P EST LOW 20 MIN: CPT | Performed by: FAMILY MEDICINE

## 2020-12-23 RX ORDER — IBUPROFEN 200 MG
400 CAPSULE ORAL
COMMUNITY
End: 2021-06-20

## 2020-12-23 RX ORDER — AMOXICILLIN AND CLAVULANATE POTASSIUM 875; 125 MG/1; MG/1
1 TABLET, FILM COATED ORAL EVERY 12 HOURS
Qty: 20 TAB | Refills: 0 | Status: SHIPPED | OUTPATIENT
Start: 2020-12-23 | End: 2021-01-02

## 2021-04-05 ENCOUNTER — HOSPITAL ENCOUNTER (EMERGENCY)
Age: 26
Discharge: HOME | End: 2021-04-05
Attending: EMERGENCY MEDICINE
Payer: MEDICAID

## 2021-04-05 VITALS
SYSTOLIC BLOOD PRESSURE: 99 MMHG | TEMPERATURE: 99.6 F | OXYGEN SATURATION: 96 % | DIASTOLIC BLOOD PRESSURE: 59 MMHG | HEART RATE: 90 BPM | BODY MASS INDEX: 28.11 KG/M2 | HEIGHT: 62 IN | WEIGHT: 152.78 LBS | RESPIRATION RATE: 16 BRPM

## 2021-04-05 DIAGNOSIS — M79.10 MYALGIA: ICD-10-CM

## 2021-04-05 DIAGNOSIS — R11.2 NON-INTRACTABLE VOMITING WITH NAUSEA, UNSPECIFIED VOMITING TYPE: Primary | ICD-10-CM

## 2021-04-05 LAB
ALBUMIN SERPL-MCNC: 3.8 G/DL (ref 3.5–5)
ALBUMIN/GLOB SERPL: 1.2 {RATIO} (ref 1.1–2.2)
ALP SERPL-CCNC: 95 U/L (ref 45–117)
ALT SERPL-CCNC: 23 U/L (ref 12–78)
AMPHET UR QL SCN: NEGATIVE
ANION GAP SERPL CALC-SCNC: 9 MMOL/L (ref 5–15)
APPEARANCE UR: CLEAR
AST SERPL-CCNC: 15 U/L (ref 15–37)
BACTERIA URNS QL MICRO: NEGATIVE /HPF
BARBITURATES UR QL SCN: NEGATIVE
BASOPHILS # BLD: 0 K/UL (ref 0–0.1)
BASOPHILS NFR BLD: 0 % (ref 0–1)
BENZODIAZ UR QL: NEGATIVE
BILIRUB SERPL-MCNC: 0.9 MG/DL (ref 0.2–1)
BILIRUB UR QL: NEGATIVE
BUN SERPL-MCNC: 11 MG/DL (ref 6–20)
BUN/CREAT SERPL: 16 (ref 12–20)
CALCIUM SERPL-MCNC: 8.4 MG/DL (ref 8.5–10.1)
CANNABINOIDS UR QL SCN: NEGATIVE
CHLORIDE SERPL-SCNC: 103 MMOL/L (ref 97–108)
CO2 SERPL-SCNC: 27 MMOL/L (ref 21–32)
COCAINE UR QL SCN: NEGATIVE
COLOR UR: ABNORMAL
CREAT SERPL-MCNC: 0.68 MG/DL (ref 0.55–1.02)
DIFFERENTIAL METHOD BLD: ABNORMAL
DRUG SCRN COMMENT,DRGCM: NORMAL
EOSINOPHIL # BLD: 0 K/UL (ref 0–0.4)
EOSINOPHIL NFR BLD: 0 % (ref 0–7)
EPITH CASTS URNS QL MICRO: ABNORMAL /LPF
ERYTHROCYTE [DISTWIDTH] IN BLOOD BY AUTOMATED COUNT: 13.1 % (ref 11.5–14.5)
GLOBULIN SER CALC-MCNC: 3.3 G/DL (ref 2–4)
GLUCOSE SERPL-MCNC: 115 MG/DL (ref 65–100)
GLUCOSE UR STRIP.AUTO-MCNC: NEGATIVE MG/DL
HCG UR QL: NEGATIVE
HCT VFR BLD AUTO: 41.6 % (ref 35–47)
HGB BLD-MCNC: 14.3 G/DL (ref 11.5–16)
HGB UR QL STRIP: ABNORMAL
IMM GRANULOCYTES # BLD AUTO: 0 K/UL (ref 0–0.04)
IMM GRANULOCYTES NFR BLD AUTO: 0 % (ref 0–0.5)
KETONES UR QL STRIP.AUTO: ABNORMAL MG/DL
LEUKOCYTE ESTERASE UR QL STRIP.AUTO: NEGATIVE
LIPASE SERPL-CCNC: 33 U/L (ref 73–393)
LYMPHOCYTES # BLD: 0.4 K/UL (ref 0.8–3.5)
LYMPHOCYTES NFR BLD: 5 % (ref 12–49)
MCH RBC QN AUTO: 28.6 PG (ref 26–34)
MCHC RBC AUTO-ENTMCNC: 34.4 G/DL (ref 30–36.5)
MCV RBC AUTO: 83.2 FL (ref 80–99)
METHADONE UR QL: NEGATIVE
MONOCYTES # BLD: 0.3 K/UL (ref 0–1)
MONOCYTES NFR BLD: 3 % (ref 5–13)
NEUTS SEG # BLD: 8 K/UL (ref 1.8–8)
NEUTS SEG NFR BLD: 92 % (ref 32–75)
NITRITE UR QL STRIP.AUTO: NEGATIVE
NRBC # BLD: 0 K/UL (ref 0–0.01)
NRBC BLD-RTO: 0 PER 100 WBC
OPIATES UR QL: NEGATIVE
PCP UR QL: NEGATIVE
PH UR STRIP: 8 [PH] (ref 5–8)
PLATELET # BLD AUTO: 180 K/UL (ref 150–400)
PMV BLD AUTO: 10.5 FL (ref 8.9–12.9)
POTASSIUM SERPL-SCNC: 3.5 MMOL/L (ref 3.5–5.1)
PROT SERPL-MCNC: 7.1 G/DL (ref 6.4–8.2)
PROT UR STRIP-MCNC: NEGATIVE MG/DL
RBC # BLD AUTO: 5 M/UL (ref 3.8–5.2)
RBC #/AREA URNS HPF: ABNORMAL /HPF (ref 0–5)
RBC MORPH BLD: ABNORMAL
SODIUM SERPL-SCNC: 139 MMOL/L (ref 136–145)
SP GR UR REFRACTOMETRY: 1.01 (ref 1–1.03)
UR CULT HOLD, URHOLD: NORMAL
UROBILINOGEN UR QL STRIP.AUTO: 0.2 EU/DL (ref 0.2–1)
WBC # BLD AUTO: 8.7 K/UL (ref 3.6–11)
WBC URNS QL MICRO: ABNORMAL /HPF (ref 0–4)

## 2021-04-05 PROCEDURE — 99284 EMERGENCY DEPT VISIT MOD MDM: CPT

## 2021-04-05 PROCEDURE — 96375 TX/PRO/DX INJ NEW DRUG ADDON: CPT

## 2021-04-05 PROCEDURE — 80053 COMPREHEN METABOLIC PANEL: CPT

## 2021-04-05 PROCEDURE — 96374 THER/PROPH/DIAG INJ IV PUSH: CPT

## 2021-04-05 PROCEDURE — 74011250636 HC RX REV CODE- 250/636: Performed by: EMERGENCY MEDICINE

## 2021-04-05 PROCEDURE — 81001 URINALYSIS AUTO W/SCOPE: CPT

## 2021-04-05 PROCEDURE — 96361 HYDRATE IV INFUSION ADD-ON: CPT

## 2021-04-05 PROCEDURE — 83690 ASSAY OF LIPASE: CPT

## 2021-04-05 PROCEDURE — 80307 DRUG TEST PRSMV CHEM ANLYZR: CPT

## 2021-04-05 PROCEDURE — 85025 COMPLETE CBC W/AUTO DIFF WBC: CPT

## 2021-04-05 PROCEDURE — 81025 URINE PREGNANCY TEST: CPT

## 2021-04-05 RX ORDER — NAPROXEN 500 MG/1
500 TABLET ORAL 2 TIMES DAILY WITH MEALS
Qty: 20 TAB | Refills: 0 | Status: SHIPPED | OUTPATIENT
Start: 2021-04-05 | End: 2021-04-15

## 2021-04-05 RX ORDER — FENTANYL CITRATE 50 UG/ML
50 INJECTION, SOLUTION INTRAMUSCULAR; INTRAVENOUS ONCE
Status: COMPLETED | OUTPATIENT
Start: 2021-04-05 | End: 2021-04-05

## 2021-04-05 RX ORDER — ONDANSETRON 2 MG/ML
4 INJECTION INTRAMUSCULAR; INTRAVENOUS
Status: COMPLETED | OUTPATIENT
Start: 2021-04-05 | End: 2021-04-05

## 2021-04-05 RX ORDER — ONDANSETRON 4 MG/1
4 TABLET, ORALLY DISINTEGRATING ORAL
Qty: 20 TAB | Refills: 0 | Status: SHIPPED | OUTPATIENT
Start: 2021-04-05 | End: 2021-06-20

## 2021-04-05 RX ORDER — DICYCLOMINE HYDROCHLORIDE 20 MG/1
20 TABLET ORAL EVERY 6 HOURS
Qty: 20 TAB | Refills: 0 | Status: SHIPPED | OUTPATIENT
Start: 2021-04-05 | End: 2021-06-20

## 2021-04-05 RX ADMIN — FENTANYL CITRATE 50 MCG: 50 INJECTION INTRAMUSCULAR; INTRAVENOUS at 11:26

## 2021-04-05 RX ADMIN — ONDANSETRON 4 MG: 2 INJECTION INTRAMUSCULAR; INTRAVENOUS at 11:25

## 2021-04-05 RX ADMIN — SODIUM CHLORIDE 1000 ML: 9 INJECTION, SOLUTION INTRAVENOUS at 11:23

## 2021-04-05 NOTE — ED NOTES
The patient was discharged home by provider in stable condition. The patient is alert and oriented, in no respiratory distress and discharge vital signs obtained. The patient's diagnosis, condition and treatment were explained. The patient expressed understanding. Three prescriptions given. Work note given. A discharge plan has been developed. A  was not involved in the process. Aftercare instructions were given. Pt ambulatory out of the ED.

## 2021-04-05 NOTE — LETTER
[facility] EMERGENCY DEPT 
[address] [facility] [place] [address] 
[phone] Work/School Note Date: 4/5/2021 To Whom It May concern: 
 
Marcus May was seen and treated today in the emergency room by the following provider(s): 
Attending Provider: Jyotsna Enriquez MD.   
 
Marcus May may return to work on 4/7/2021. Sincerely, Tyler Desai RN

## 2021-04-05 NOTE — ED TRIAGE NOTES
Pt presents to ED with c/o awakening about 0100 this am with vomiting and bilateral hip pain. Pt denies other symptoms.

## 2021-04-05 NOTE — ED NOTES
Patient medicated with ordered IV fentanyl and zofran. Patient tolerated well. IV fluids infusing via gravity without difficulty as ordered. Patient given blanket and lights dimmed for comfort.

## 2021-04-05 NOTE — ED NOTES
IV fluid infusion completed. Patient tolerated well. Patient states she is feeling much better since medication administration. Patient in no distress at this time.

## 2021-04-05 NOTE — DISCHARGE INSTRUCTIONS
Thank you for allowing us to provide you with medical care today. We realize that you have many choices for your emergency care needs. We thank you for choosing [facility]. Please choose us in the future for any continued health care needs. We hope we addressed all of your medical concerns. We strive to provide excellent quality care in the Emergency Department. Anything less than excellent does not meet our expectations. The exam and treatment you received in the Emergency Department were for an emergent problem and are not intended as complete care. It is important that you follow up with a doctor, nurse practitioner, or physician's assistant for ongoing care. If your symptoms worsen or you do not improve as expected and you are unable to reach your usual health care provider, you should return to the Emergency Department. We are available 24 hours a day. Take this sheet with you when you go to your follow-up visit. If you have any problem arranging the follow-up visit, contact the Emergency Department immediately. Make an appointment your family doctor for follow up of this visit. Return to the ER if you are unable to be seen in a timely manner.

## 2021-04-08 ENCOUNTER — TELEPHONE (OUTPATIENT)
Dept: FAMILY MEDICINE CLINIC | Age: 26
End: 2021-04-08

## 2021-04-08 ENCOUNTER — VIRTUAL VISIT (OUTPATIENT)
Dept: FAMILY MEDICINE CLINIC | Age: 26
End: 2021-04-08
Payer: MEDICAID

## 2021-04-08 DIAGNOSIS — R19.7 DIARRHEA, UNSPECIFIED TYPE: Primary | ICD-10-CM

## 2021-04-08 PROCEDURE — 99213 OFFICE O/P EST LOW 20 MIN: CPT | Performed by: NURSE PRACTITIONER

## 2021-04-08 NOTE — LETTER
NOTIFICATION RETURN TO WORK / SCHOOL 
 
4/8/2021 1:12 PM 
 
Ms. Laith Georges [place] [id number] To Whom It May Concern: 
 
Laith Georges is currently under the care of [address] [place] [address]. She needs to be excused from work on 4/8 and 4/9, due to illness. If there are questions or concerns please have the patient contact our office. Sincerely, Jatinder Burgos NP

## 2021-04-08 NOTE — PROGRESS NOTES
HISTORY OF PRESENT ILLNESS  Jefry Callejas is a 31 y.o. female. HPI  Pt presents with \"ER follow up\"  Visit was conducted via [email or web address] me  Pt was located at home, provider was located at home  [facility], who was evaluated through a synchronous (real-time) audio-video encounter, and/or her healthcare decision maker, is aware that it is a billable service, with coverage as determined by her insurance carrier. She provided verbal consent to proceed: Yes, and patient identification was verified. This visit was conducted pursuant to the emergency declaration under the [facility], [address] [place] [address] authority and the [employer] and [employer] General Act. A caregiver was present when appropriate. Ability to conduct physical exam was limited. The patient was located in a state where the provider was credentialed to provide care. --Jatinder Burgos NP on 4/8/2021 at 1:15 PM      Pt was seen at the [facility] ER on 4/5  Pt states that they diagnosed her with the \"stomach flu\", and told her that her symptoms would improve with time  ER note reviewed, labs and exam were within normal limits. Pt states that she has stopped vomiting  She has on fever at this time  She has continued to have diarrhea  She states that she feels much better, but needs a note for work at this time. 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 Review of Systems   Constitutional: Negative for fever. Gastrointestinal: Positive for diarrhea. Negative for vomiting. Physical Exam  Constitutional:       Appearance: Normal appearance. Neurological:      Mental Status: She is alert. Psychiatric:         Behavior: Behavior normal.         ASSESSMENT and PLAN    ICD-10-CM ICD-9-CM    1.  Diarrhea, unspecified type  R19.7 787.91      Note written for work  Educated about continuing to stay well hydrated, pushing fluids, and following the BRAT diet  Should notify office should symptoms continue and/or worsen    Pt informed to return to office with worsening of symptoms, or PRN with any questions or concerns. Pt verbalizes understanding of plan of care and denies further questions or concerns at this time.

## 2021-04-26 ENCOUNTER — OFFICE VISIT (OUTPATIENT)
Dept: SURGERY | Age: 26
End: 2021-04-26
Payer: MEDICAID

## 2021-04-26 VITALS
SYSTOLIC BLOOD PRESSURE: 117 MMHG | DIASTOLIC BLOOD PRESSURE: 66 MMHG | WEIGHT: 156 LBS | OXYGEN SATURATION: 92 % | BODY MASS INDEX: 28.53 KG/M2 | HEART RATE: 85 BPM

## 2021-04-26 DIAGNOSIS — R10.31 RLQ ABDOMINAL PAIN: Primary | ICD-10-CM

## 2021-04-26 PROCEDURE — 99203 OFFICE O/P NEW LOW 30 MIN: CPT | Performed by: SURGERY

## 2021-04-26 RX ORDER — LEVONORGESTREL AND ETHINYL ESTRADIOL 0.1-0.02MG
0.1 KIT ORAL
COMMUNITY
Start: 2021-04-16

## 2021-04-26 NOTE — PROGRESS NOTES
Identified pt with two pt identifiers(name and ). Reviewed record in preparation for visit and have obtained necessary documentation. All patient medications has been reviewed. Chief Complaint   Patient presents with    Incisional Hernia     seen at the request of Dr. Maria Renteria for eval incisional hernia        Health Maintenance Due   Topic    Hepatitis C Screening     Pneumococcal 0-64 years (1 of 1 - PPSV23)    COVID-19 Vaccine (1)    DTaP/Tdap/Td series (1 - Tdap)    PAP AKA CERVICAL CYTOLOGY        Vitals:    21 0927   BP: 117/66   Pulse: 85   SpO2: 92%   Weight: 70.8 kg (156 lb)   PainSc:   0 - No pain   LMP: 2021       4. Have you been to the ER, urgent care clinic since your last visit? Hospitalized since your last visit? Yes When:  Where: [facility] Reason for visit: Stomach Virus    5. Have you seen or consulted any other health care providers outside of the [address] [place] [address] since your last visit? Include any pap smears or colon screening. No      Patient is accompanied by self I have received verbal consent from [address] [place] [address] to discuss any/all medical information while they are present in the room.

## 2021-04-26 NOTE — Clinical Note
4/26/2021 Patient: Ted Maldonado YOB: 1995 Date of Visit: 4/26/2021 Belle Padron MD 
[facility] [facility] [place] [id number] [address] [employer] Aleks Carreno MD 
[address] [address] [address] Fax: [phone] Dear MD Aleks Benavides MD, Thank you for referring Ms. Jefry Garibay to [address] [place] [address] for evaluation. My notes for this consultation are attached. If you have questions, please do not hesitate to call me. I look forward to following your patient along with you.  
 
 
Sincerely, 
 
Chang Flowers MD
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
## 2021-06-20 ENCOUNTER — HOSPITAL ENCOUNTER (EMERGENCY)
Dept: GENERAL RADIOLOGY | Age: 26
Discharge: HOME | End: 2021-06-20
Attending: STUDENT IN AN ORGANIZED HEALTH CARE EDUCATION/TRAINING PROGRAM
Payer: MEDICAID

## 2021-06-20 ENCOUNTER — HOSPITAL ENCOUNTER (EMERGENCY)
Age: 26
Discharge: HOME | End: 2021-06-20
Attending: STUDENT IN AN ORGANIZED HEALTH CARE EDUCATION/TRAINING PROGRAM
Payer: MEDICAID

## 2021-06-20 VITALS
SYSTOLIC BLOOD PRESSURE: 123 MMHG | HEIGHT: 62 IN | BODY MASS INDEX: 27.18 KG/M2 | DIASTOLIC BLOOD PRESSURE: 79 MMHG | WEIGHT: 147.71 LBS | HEART RATE: 106 BPM | OXYGEN SATURATION: 97 % | RESPIRATION RATE: 18 BRPM | TEMPERATURE: 98.5 F

## 2021-06-20 DIAGNOSIS — T14.8XXA ABRASION: ICD-10-CM

## 2021-06-20 DIAGNOSIS — S53.401A SPRAIN OF RIGHT ELBOW, INITIAL ENCOUNTER: ICD-10-CM
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
## 2021-06-20 DIAGNOSIS — S40.021A CONTUSION OF RIGHT UPPER EXTREMITY, INITIAL ENCOUNTER: ICD-10-CM

## 2021-06-20 DIAGNOSIS — S63.501A SPRAIN OF RIGHT WRIST, INITIAL ENCOUNTER: ICD-10-CM

## 2021-06-20 LAB — HCG UR QL: NEGATIVE

## 2021-06-20 PROCEDURE — 73562 X-RAY EXAM OF KNEE 3: CPT

## 2021-06-20 PROCEDURE — 73502 X-RAY EXAM HIP UNI 2-3 VIEWS: CPT

## 2021-06-20 PROCEDURE — 73080 X-RAY EXAM OF ELBOW: CPT

## 2021-06-20 PROCEDURE — 74011250637 HC RX REV CODE- 250/637: Performed by: STUDENT IN AN ORGANIZED HEALTH CARE EDUCATION/TRAINING PROGRAM

## 2021-06-20 PROCEDURE — 99284 EMERGENCY DEPT VISIT MOD MDM: CPT

## 2021-06-20 PROCEDURE — 73060 X-RAY EXAM OF HUMERUS: CPT

## 2021-06-20 PROCEDURE — 73110 X-RAY EXAM OF WRIST: CPT

## 2021-06-20 PROCEDURE — 75810000275 HC EMERGENCY DEPT VISIT NO LEVEL OF CARE

## 2021-06-20 PROCEDURE — 81025 URINE PREGNANCY TEST: CPT

## 2021-06-20 RX ORDER — OXYCODONE AND ACETAMINOPHEN 5; 325 MG/1; MG/1
1 TABLET ORAL
Qty: 8 TABLET | Refills: 0 | Status: SHIPPED | OUTPATIENT
Start: 2021-06-20 | End: 2021-06-22 | Stop reason: SDUPTHER

## 2021-06-20 RX ORDER — IBUPROFEN 800 MG/1
800 TABLET ORAL
Status: COMPLETED | OUTPATIENT
Start: 2021-06-20 | End: 2021-06-20

## 2021-06-20 RX ADMIN — IBUPROFEN 800 MG: 800 TABLET, FILM COATED ORAL at 12:45

## 2021-06-20 NOTE — ED NOTES
Pt given discharge instructions by Dr Rohini Newton she verbalizes an understanding pt stable at time of discharge
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
## 2021-06-20 NOTE — DISCHARGE INSTRUCTIONS
PLEASE KEEP A CLOSE EYE ON YOUR WOUND(S). IF YOU NOTICE RED STREAKING, INCREASING DRAINAGE, WORSENING REDNESS, OR FEVER THEN PLEASE RETURN IMMEDIATELY. RETURN IF WORSENING PAIN, CHANGE IN COLOR, CHANGE IN TEMPERATURE, OR LOSS OF SENSATION IN THE AFFECTED EXTREMITY    WEAR THE THUMB SPLINT UNTIL CLEARED BY ORTHOPEDICS.  NO FRACTURE SEEN BUT SOMETIMES IT WON'T SHOW UP EARLY AND IF NOT TREATED WITH THE SPLINT YOU COULD DEVELOP CHRONIC PAIN AND MOBILITY ISSUES    USE CAUTION TAKING THE PAIN MEDICATION AS IT CAN CAUSE DROWSINESS--NO DRIVING WHILE TAKING IT. DO NOT MIX IT WITH TYLENOL AS IT HAS TYLENOL IN IT

## 2021-06-22 ENCOUNTER — OFFICE VISIT (OUTPATIENT)
Dept: FAMILY MEDICINE CLINIC | Age: 26
End: 2021-06-22
Payer: MEDICAID

## 2021-06-22 VITALS
WEIGHT: 150 LBS | TEMPERATURE: 98.3 F | HEART RATE: 68 BPM | OXYGEN SATURATION: 97 % | RESPIRATION RATE: 18 BRPM | HEIGHT: 62 IN | BODY MASS INDEX: 27.6 KG/M2

## 2021-06-22 DIAGNOSIS — R52 PAIN: ICD-10-CM

## 2021-06-22 DIAGNOSIS — Z09 HOSPITAL DISCHARGE FOLLOW-UP: ICD-10-CM
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
## 2021-06-22 DIAGNOSIS — S63.501A SPRAIN OF RIGHT WRIST, INITIAL ENCOUNTER: ICD-10-CM
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
## 2021-06-22 DIAGNOSIS — S53.401A SPRAIN OF RIGHT ELBOW, INITIAL ENCOUNTER: ICD-10-CM

## 2021-06-22 PROBLEM — S92.414A NONDISPLACED FRACTURE OF PROXIMAL PHALANX OF RIGHT GREAT TOE, INITIAL ENCOUNTER FOR CLOSED FRACTURE: Status: ACTIVE | Noted: 2018-04-30

## 2021-06-22 PROCEDURE — 96372 THER/PROPH/DIAG INJ SC/IM: CPT | Performed by: INTERNAL MEDICINE

## 2021-06-22 PROCEDURE — 99214 OFFICE O/P EST MOD 30 MIN: CPT | Performed by: INTERNAL MEDICINE

## 2021-06-22 PROCEDURE — 1111F DSCHRG MED/CURRENT MED MERGE: CPT | Performed by: INTERNAL MEDICINE

## 2021-06-22 RX ORDER — OXYCODONE AND ACETAMINOPHEN 5; 325 MG/1; MG/1
1 TABLET ORAL
Qty: 8 TABLET | Refills: 0 | Status: SHIPPED | OUTPATIENT
Start: 2021-06-22 | End: 2021-06-24

## 2021-06-22 RX ORDER — NORETHINDRONE ACETATE AND ETHINYL ESTRADIOL AND FERROUS FUMARATE 1MG-20(21)
KIT ORAL
COMMUNITY
Start: 2021-06-03

## 2021-06-22 RX ORDER — METHYLPREDNISOLONE 4 MG/1
TABLET ORAL
Qty: 1 DOSE PACK | Refills: 0 | Status: SHIPPED | OUTPATIENT
Start: 2021-06-22

## 2021-06-22 RX ORDER — KETOROLAC TROMETHAMINE 30 MG/ML
60 INJECTION, SOLUTION INTRAMUSCULAR; INTRAVENOUS ONCE
Qty: 1 VIAL | Refills: 0
Start: 2021-06-22 | End: 2021-06-22

## 2021-06-22 NOTE — LETTER
NOTIFICATION RETURN TO WORK / SCHOOL 
 
6/22/2021 12:12 PM 
 
Ms. Ivania Granger [place] [phone] To Whom It May Concern: 
 
Ivania Granger is currently under the care of [address] [place] [address]. She will return to work/school on: 6/24/2021. If there are questions or concerns please have the patient contact our office. Sincerely, Prince Horace MD
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
## 2021-06-22 NOTE — PATIENT INSTRUCTIONS

## 2021-08-31 ENCOUNTER — HOSPITAL ENCOUNTER (OUTPATIENT)
Dept: CT IMAGING | Age: 26
Discharge: HOME | End: 2021-08-31
Attending: SURGERY
Payer: MEDICAID

## 2021-08-31 DIAGNOSIS — R10.31 RLQ ABDOMINAL PAIN: ICD-10-CM

## 2021-08-31 PROCEDURE — 74177 CT ABD & PELVIS W/CONTRAST: CPT

## 2021-08-31 PROCEDURE — 74011000636 HC RX REV CODE- 636: Performed by: RADIOLOGY

## 2021-08-31 RX ADMIN — IOPAMIDOL 100 ML: 755 INJECTION, SOLUTION INTRAVENOUS at 13:39

## 2021-09-02 ENCOUNTER — TELEPHONE (OUTPATIENT)
Dept: SURGERY | Age: 26
End: 2021-09-02

## 2021-09-02 NOTE — TELEPHONE ENCOUNTER
No incisional hernia on CT. There is a transcription error likely talking about an umbilical hernia that is actually probably an over-call. Her pain is not there. We discussed that it is likely due to scar tissue from  and I encouraged her to keep up with her yoga and try to stretch it out. Ice and advil as needed. She appreciated the call.

## 2021-09-10 ENCOUNTER — HOSPITAL ENCOUNTER (EMERGENCY)
Age: 26
Discharge: HOME | End: 2021-09-10
Attending: EMERGENCY MEDICINE
Payer: MEDICAID

## 2021-09-10 VITALS
HEART RATE: 66 BPM | OXYGEN SATURATION: 98 % | DIASTOLIC BLOOD PRESSURE: 79 MMHG | WEIGHT: 140 LBS | RESPIRATION RATE: 16 BRPM | HEIGHT: 62 IN | TEMPERATURE: 98.3 F | SYSTOLIC BLOOD PRESSURE: 128 MMHG | BODY MASS INDEX: 25.76 KG/M2

## 2021-09-10 DIAGNOSIS — M54.41 ACUTE MIDLINE LOW BACK PAIN WITH RIGHT-SIDED SCIATICA: Primary | ICD-10-CM

## 2021-09-10 LAB
APPEARANCE UR: CLEAR
BACTERIA URNS QL MICRO: ABNORMAL /HPF
BILIRUB UR QL: NEGATIVE
COLOR UR: ABNORMAL
EPITH CASTS URNS QL MICRO: ABNORMAL /LPF
GLUCOSE UR STRIP.AUTO-MCNC: NEGATIVE MG/DL
HCG UR QL: NEGATIVE
HGB UR QL STRIP: NEGATIVE
KETONES UR QL STRIP.AUTO: NEGATIVE MG/DL
LEUKOCYTE ESTERASE UR QL STRIP.AUTO: NEGATIVE
NITRITE UR QL STRIP.AUTO: NEGATIVE
PH UR STRIP: 6 [PH] (ref 5–8)
PROT UR STRIP-MCNC: NEGATIVE MG/DL
RBC #/AREA URNS HPF: ABNORMAL /HPF (ref 0–5)
SP GR UR REFRACTOMETRY: 1.02 (ref 1–1.03)
UR CULT HOLD, URHOLD: NORMAL
UROBILINOGEN UR QL STRIP.AUTO: 0.2 EU/DL (ref 0.2–1)
WBC URNS QL MICRO: ABNORMAL /HPF (ref 0–4)

## 2021-09-10 PROCEDURE — 81025 URINE PREGNANCY TEST: CPT

## 2021-09-10 PROCEDURE — 81001 URINALYSIS AUTO W/SCOPE: CPT

## 2021-09-10 PROCEDURE — 74011636637 HC RX REV CODE- 636/637: Performed by: EMERGENCY MEDICINE

## 2021-09-10 PROCEDURE — 74011250637 HC RX REV CODE- 250/637: Performed by: EMERGENCY MEDICINE

## 2021-09-10 PROCEDURE — 99284 EMERGENCY DEPT VISIT MOD MDM: CPT

## 2021-09-10 PROCEDURE — 74011000250 HC RX REV CODE- 250: Performed by: EMERGENCY MEDICINE

## 2021-09-10 RX ORDER — ACETAMINOPHEN 500 MG
1000 TABLET ORAL
Status: COMPLETED | OUTPATIENT
Start: 2021-09-10 | End: 2021-09-10

## 2021-09-10 RX ORDER — LIDOCAINE 50 MG/G
1 PATCH TOPICAL EVERY 24 HOURS
Qty: 5 PATCH | Refills: 0 | Status: SHIPPED | OUTPATIENT
Start: 2021-09-10

## 2021-09-10 RX ORDER — CYCLOBENZAPRINE HCL 10 MG
10 TABLET ORAL
Qty: 20 TABLET | Refills: 0 | Status: SHIPPED | OUTPATIENT
Start: 2021-09-10 | End: 2021-09-15

## 2021-09-10 RX ORDER — PREDNISONE 20 MG/1
40 TABLET ORAL
Qty: 10 TABLET | Refills: 0 | Status: SHIPPED | OUTPATIENT
Start: 2021-09-10 | End: 2021-09-15

## 2021-09-10 RX ORDER — PREDNISONE 20 MG/1
40 TABLET ORAL
Status: COMPLETED | OUTPATIENT
Start: 2021-09-10 | End: 2021-09-10

## 2021-09-10 RX ORDER — ACETAMINOPHEN 500 MG
1000 TABLET ORAL 3 TIMES DAILY
Qty: 24 TABLET | Refills: 0 | Status: SHIPPED | OUTPATIENT
Start: 2021-09-10 | End: 2021-09-14

## 2021-09-10 RX ORDER — LIDOCAINE 4 G/100G
1 PATCH TOPICAL ONCE
Status: DISCONTINUED | OUTPATIENT
Start: 2021-09-10 | End: 2021-09-11 | Stop reason: HOSPADM

## 2021-09-10 RX ORDER — OXYCODONE HYDROCHLORIDE 5 MG/1
5 TABLET ORAL
Status: COMPLETED | OUTPATIENT
Start: 2021-09-10 | End: 2021-09-10

## 2021-09-10 RX ORDER — OXYCODONE HYDROCHLORIDE 5 MG/1
5 TABLET ORAL
Qty: 7 TABLET | Refills: 0 | Status: SHIPPED | OUTPATIENT
Start: 2021-09-10 | End: 2021-09-13

## 2021-09-10 RX ADMIN — PREDNISONE 40 MG: 20 TABLET ORAL at 22:35

## 2021-09-10 RX ADMIN — ACETAMINOPHEN 1000 MG: 500 TABLET ORAL at 22:35

## 2021-09-10 RX ADMIN — OXYCODONE 5 MG: 5 TABLET ORAL at 22:58

## 2021-09-11 NOTE — ED TRIAGE NOTES
Patient states that at 4am this morning she started having intense nerve pain from her back to her vagina, and down her leg. Has tried taking PO voltaren and flexeril. Rating pain 9/10.
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
## 2021-09-11 NOTE — ED NOTES
Pt alert and oriented x4. Pt VS and condition stable for discharge. Discharge instructions, medications and follow up with appropriate specialist discussed with pt. Pt denies questions at this time.

## 2021-11-16 ENCOUNTER — VIRTUAL VISIT (OUTPATIENT)
Dept: FAMILY MEDICINE CLINIC | Age: 26
End: 2021-11-16
Payer: MEDICAID

## 2021-11-16 DIAGNOSIS — R05.8 PRODUCTIVE COUGH: Primary | ICD-10-CM

## 2021-11-16 DIAGNOSIS — R04.2 COUGH WITH HEMOPTYSIS: ICD-10-CM

## 2021-11-16 DIAGNOSIS — R68.83 CHILLS (WITHOUT FEVER): ICD-10-CM

## 2021-11-16 PROCEDURE — 99213 OFFICE O/P EST LOW 20 MIN: CPT | Performed by: INTERNAL MEDICINE
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
## 2021-11-20 RX ORDER — AZITHROMYCIN 250 MG/1
TABLET, FILM COATED ORAL
Qty: 6 TABLET | Refills: 0 | Status: SHIPPED | OUTPATIENT
Start: 2021-11-20 | End: 2021-11-25

## 2022-03-19 PROBLEM — F60.3 BORDERLINE PERSONALITY DISORDER IN ADULT (HCC): Status: ACTIVE | Noted: 2018-10-23

## 2022-03-19 PROBLEM — S92.414A NONDISPLACED FRACTURE OF PROXIMAL PHALANX OF RIGHT GREAT TOE, INITIAL ENCOUNTER FOR CLOSED FRACTURE: Status: ACTIVE | Noted: 2018-04-30

## 2022-03-19 PROBLEM — R00.1 SINUS BRADYCARDIA: Status: ACTIVE | Noted: 2019-10-01

## 2022-03-19 PROBLEM — F32.3 DEPRESSIVE PSYCHOSIS (HCC): Status: ACTIVE | Noted: 2018-10-22
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
## 2022-11-16 NOTE — PROGRESS NOTES
From: Mirlande Lynne  Sent: 11/16/2022 1:14 PM CST  To: S[facility]inner Nurse Msg Pool  Subject: Therapist     Looking to start some therapy.  Thank you   Oncology Nursing Communication Tool      5:50 PM  6/29/2017     Bedside shift change report given to Ashleigh Walter RN (incoming nurse) by Irene Alexander RN (outgoing nurse) on Belkys Watkins a 20 y.o. female who was admitted on 6/28/2017  3:17 PM. Report included the following information SBAR, Kardex, Intake/Output, MAR, Accordion and Recent Results. Significant changes during shift: IV Ancef started; No episodes of vomiting; PRN Percocet x2; PRN Zofran x1; PRN Compazine x1      Issues for physician to address: No issue at this time Thank You          Code Status: Full Code     Infections: No current active infections     Allergies: Latex; Monistat 1 (tioconazole) [tioconazole]; and Sulfa (sulfonamide antibiotics)     Current diet: DIET REGULAR       Pain Controlled [x] yes [] no   Bowel Movement [x] yes [] no   Last Bowel Movement (date) 6/29/17            Vital Signs:   Patient Vitals for the past 12 hrs:   Temp Pulse Resp BP SpO2   06/29/17 1438 98.2 °F (36.8 °C) 83 16 138/74 96 %      Intake & Output:   No intake or output data in the 24 hours ending 06/29/17 1750   Laboratory Results:   No results found for this or any previous visit (from the past 12 hour(s)). Opportunity for questions and clarifications were given to the incoming nurse. Patient's bed is in low position, side rails x2, door open PRN, call bell within reach and patient not in distress.       Irene Alexander RN

## 2023-03-01 NOTE — PROGRESS NOTES
Pt of Dr. Aura Grover here c/o abdominal pain and vaginal pressure x 2 days. Pt also reports decreased fetal movement. Pt denies VB or ROM. Pregnancy complicated by cholestasis. Also states she has HELLP. Pt currently taking Labetalol for increased BPs. L1, [facility] 17. Pt states baby is breech and that she will be a repeat c/s if in labor. Consents explained and witnessed, pt acknowledged w/signature. Pt allgs listed above. Pt placed on EFM, heart tones present. 0505-Report to Dr. Padma Adan, orders received. 0715-Polly Kemp at bedside assessing pt, FFN collected. Discharge orders received. Pt to go back on bedrest.    0711-Davide reviewed d/c instructions w/pt. Concerns addressed, questions answered. 0742-Pt off unit via w/c. To f/u w/Dr. Gordy De Leon office. Spironolactone Counseling: Patient advised regarding risks of diarrhea, abdominal pain, hyperkalemia, birth defects (for female patients), liver toxicity and renal toxicity. The patient may need blood work to monitor liver and kidney function and potassium levels while on therapy. The patient verbalized understanding of the proper use and possible adverse effects of spironolactone.  All of the patient's questions and concerns were addressed.

## 2023-05-14 RX ORDER — LIDOCAINE 50 MG/G
1 PATCH TOPICAL EVERY 24 HOURS
COMMUNITY
Start: 2021-09-10

## 2023-05-14 RX ORDER — LEVONORGESTREL AND ETHINYL ESTRADIOL 0.1-0.02MG
0.1 KIT ORAL
COMMUNITY
Start: 2021-04-16

## 2023-05-14 RX ORDER — NORETHINDRONE ACETATE AND ETHINYL ESTRADIOL 1MG-20(21)
KIT ORAL
COMMUNITY
Start: 2021-06-03

## 2023-05-14 RX ORDER — METHYLPREDNISOLONE 4 MG/1
TABLET ORAL
COMMUNITY
Start: 2021-06-22
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
## 2023-09-27 NOTE — PROGRESS NOTES
Lm for mother letting her know that we have not rec'd notification from her pharmacy or insurance company PA need for this medication, we will submit PA today. Once patient reaches 200 mg thru titration, then we will switch to a 200 mg tablet starting with week 9      Continue on lamotrigine 250 mg twice a day and zonisamide 200 mg twice a day.  I will send in a prescription for weeks 1 through 4 for lacosamide (vimpat).  When you are ready for refill, give us a call for the prescription for weeks 5 through 8.     Weeks 1&2: Continue Dilantin 250 mg nightly.  Add lacosamide 50 mg twice daily.  Weeks 3&4: Decrease Dilantin to 200 mg nightly.  Increase lacosamide to 100 mg twice daily.     Weeks 5&6: Decrease Dilantin to 150 mg nightly.  Increase lacosamide to 150 mg twice daily.  Weeks 7&8: Decrease Dilantin to 100 mg nightly.  Increase lacosamide to 200 mg twice daily.     Weeks 9&10: Decrease Dilantin to 50 mg nightly.  Continue on lacosamide 200 mg twice daily.  Weeks 11&12: Stop Dilantin.  Continue on lacosamide 200 mg twice daily.   Problem: Patient Education: Go to Patient Education Activity  Goal: Patient/Family Education  Outcome: Progressing Towards Goal  Patient is in bed; bed is in lowest position, side rails times two, wheels locked, call light, bedside tray, phone, and personal items are within reach. Patient is alert and oriented times four and is up ad maria e to the bathroom. The patient has been instructed to call for assistance and verbalized understanding of instructions provided.

## 2023-10-11 NOTE — PROGRESS NOTES
Assessment as charted. Mom comfortable. FOB @ bedside. Pt up to void and back to bed, slept through since 930 pm, Zofran given per request, pt states\" feels better\".  TSH and CMP re draw and sent

## (undated) DEVICE — BAG SPEC BIOHZRD 10 X 10 IN --

## (undated) DEVICE — BASIN EMSIS 16OZ GRAPHITE PLAS KID SHP MOLD GRAD FOR ORAL

## (undated) DEVICE — Device

## (undated) DEVICE — KENDALL RADIOLUCENT FOAM MONITORING ELECTRODE -RECTANGULAR SHAPE: Brand: KENDALL

## (undated) DEVICE — CONTAINER SPEC 20 ML LID NEUT BUFF FORMALIN 10 % POLYPR STS

## (undated) DEVICE — NDL PRT INJ NSAF BLNT 18GX1.5 --

## (undated) DEVICE — 1200 GUARD II KIT W/5MM TUBE W/O VAC TUBE: Brand: GUARDIAN

## (undated) DEVICE — KIT COLON W/ 1.1OZ LUB AND 2 END

## (undated) DEVICE — SYR 3ML LL TIP 1/10ML GRAD --

## (undated) DEVICE — CANNULA CUSH AD W/ 14FT TBG

## (undated) DEVICE — SET ADMIN 16ML TBNG L100IN 2 Y INJ SITE IV PIGGY BK DISP

## (undated) DEVICE — FORCEPS BX L240CM JAW DIA2.8MM L CAP W/ NDL MIC MESH TOOTH

## (undated) DEVICE — BITEBLOCK ENDOSCP 60FR MAXI WHT POLYETH STURDY W/ VELC WVN

## (undated) DEVICE — CANN NASAL O2 CAPNOGRAPHY AD -- FILTERLINE

## (undated) DEVICE — NDL FLTR TIP 5 MIC 18GX1.5IN --

## (undated) DEVICE — SOLIDIFIER MEDC 1200ML -- CONVERT TO 356117

## (undated) DEVICE — ADULT SPO2 SENSOR: Brand: NELLCOR

## (undated) DEVICE — BAG BELONG PT PERS CLEAR HANDL